# Patient Record
Sex: MALE | Race: WHITE | NOT HISPANIC OR LATINO | Employment: OTHER | URBAN - METROPOLITAN AREA
[De-identification: names, ages, dates, MRNs, and addresses within clinical notes are randomized per-mention and may not be internally consistent; named-entity substitution may affect disease eponyms.]

---

## 2022-12-01 ENCOUNTER — ANESTHESIA EVENT (OUTPATIENT)
Dept: PERIOP | Facility: HOSPITAL | Age: 82
End: 2022-12-01

## 2022-12-01 RX ORDER — FAMOTIDINE 10 MG/ML
20 INJECTION, SOLUTION INTRAVENOUS ONCE
Status: CANCELLED | OUTPATIENT
Start: 2022-12-01 | End: 2022-12-01

## 2022-12-02 ENCOUNTER — ANESTHESIA (OUTPATIENT)
Dept: PERIOP | Facility: HOSPITAL | Age: 82
End: 2022-12-02

## 2022-12-02 ENCOUNTER — HOSPITAL ENCOUNTER (OUTPATIENT)
Facility: HOSPITAL | Age: 82
Setting detail: HOSPITAL OUTPATIENT SURGERY
Discharge: HOME | End: 2022-12-02
Attending: UROLOGY | Admitting: UROLOGY

## 2022-12-02 VITALS
DIASTOLIC BLOOD PRESSURE: 67 MMHG | TEMPERATURE: 97.2 F | RESPIRATION RATE: 16 BRPM | HEART RATE: 55 BPM | BODY MASS INDEX: 34.69 KG/M2 | OXYGEN SATURATION: 93 % | WEIGHT: 221 LBS | HEIGHT: 67 IN | SYSTOLIC BLOOD PRESSURE: 121 MMHG

## 2022-12-02 DIAGNOSIS — N13.8 BPH WITH OBSTRUCTION/LOWER URINARY TRACT SYMPTOMS: Primary | ICD-10-CM

## 2022-12-02 DIAGNOSIS — N40.1 BPH WITH OBSTRUCTION/LOWER URINARY TRACT SYMPTOMS: Primary | ICD-10-CM

## 2022-12-02 LAB
ALBUMIN SERPL-MCNC: 3.8 G/DL (ref 3.5–5.2)
ALBUMIN/GLOB SERPL: 1 G/DL
ALP SERPL-CCNC: 56 U/L (ref 39–117)
ALT SERPL W P-5'-P-CCNC: 21 U/L (ref 1–41)
ANION GAP SERPL CALCULATED.3IONS-SCNC: 10 MMOL/L (ref 5–15)
AST SERPL-CCNC: 17 U/L (ref 1–40)
BILIRUB SERPL-MCNC: 0.4 MG/DL (ref 0–1.2)
BUN SERPL-MCNC: 24 MG/DL (ref 8–23)
BUN/CREAT SERPL: 29.6 (ref 7–25)
CALCIUM SPEC-SCNC: 9.1 MG/DL (ref 8.6–10.5)
CHLORIDE SERPL-SCNC: 109 MMOL/L (ref 98–107)
CO2 SERPL-SCNC: 21 MMOL/L (ref 22–29)
CREAT SERPL-MCNC: 0.81 MG/DL (ref 0.76–1.27)
DEPRECATED RDW RBC AUTO: 56.9 FL (ref 37–54)
EGFRCR SERPLBLD CKD-EPI 2021: 88 ML/MIN/1.73
ERYTHROCYTE [DISTWIDTH] IN BLOOD BY AUTOMATED COUNT: 19.7 % (ref 12.3–15.4)
GLOBULIN UR ELPH-MCNC: 3.8 GM/DL
GLUCOSE SERPL-MCNC: 102 MG/DL (ref 65–99)
HCT VFR BLD AUTO: 37.6 % (ref 37.5–51)
HGB BLD-MCNC: 11.5 G/DL (ref 13–17.7)
MCH RBC QN AUTO: 24.7 PG (ref 26.6–33)
MCHC RBC AUTO-ENTMCNC: 30.6 G/DL (ref 31.5–35.7)
MCV RBC AUTO: 80.9 FL (ref 79–97)
PLATELET # BLD AUTO: 246 10*3/MM3 (ref 140–450)
PMV BLD AUTO: 11.1 FL (ref 6–12)
POTASSIUM SERPL-SCNC: 4 MMOL/L (ref 3.5–5.2)
PROT SERPL-MCNC: 7.6 G/DL (ref 6–8.5)
QT INTERVAL: 420 MS
QTC INTERVAL: 456 MS
RBC # BLD AUTO: 4.65 10*6/MM3 (ref 4.14–5.8)
SODIUM SERPL-SCNC: 140 MMOL/L (ref 136–145)
WBC NRBC COR # BLD: 7.29 10*3/MM3 (ref 3.4–10.8)

## 2022-12-02 PROCEDURE — 85027 COMPLETE CBC AUTOMATED: CPT | Performed by: ANESTHESIOLOGY

## 2022-12-02 PROCEDURE — 25010000002 DEXAMETHASONE PER 1 MG: Performed by: NURSE ANESTHETIST, CERTIFIED REGISTERED

## 2022-12-02 PROCEDURE — 25010000002 FENTANYL CITRATE (PF) 50 MCG/ML SOLUTION

## 2022-12-02 PROCEDURE — 93010 ELECTROCARDIOGRAM REPORT: CPT | Performed by: INTERNAL MEDICINE

## 2022-12-02 PROCEDURE — 25010000002 FENTANYL CITRATE (PF) 100 MCG/2ML SOLUTION: Performed by: NURSE ANESTHETIST, CERTIFIED REGISTERED

## 2022-12-02 PROCEDURE — 80053 COMPREHEN METABOLIC PANEL: CPT | Performed by: ANESTHESIOLOGY

## 2022-12-02 PROCEDURE — 25010000002 ONDANSETRON PER 1 MG: Performed by: NURSE ANESTHETIST, CERTIFIED REGISTERED

## 2022-12-02 PROCEDURE — 25010000002 PROPOFOL 10 MG/ML EMULSION: Performed by: NURSE ANESTHETIST, CERTIFIED REGISTERED

## 2022-12-02 PROCEDURE — 93005 ELECTROCARDIOGRAM TRACING: CPT | Performed by: ANESTHESIOLOGY

## 2022-12-02 PROCEDURE — 25010000002 CEFOXITIN PER 1 G: Performed by: UROLOGY

## 2022-12-02 RX ORDER — DOCUSATE SODIUM 100 MG/1
100 CAPSULE, LIQUID FILLED ORAL 2 TIMES DAILY
COMMUNITY

## 2022-12-02 RX ORDER — HYDRALAZINE HYDROCHLORIDE 20 MG/ML
5 INJECTION INTRAMUSCULAR; INTRAVENOUS
Status: DISCONTINUED | OUTPATIENT
Start: 2022-12-02 | End: 2022-12-02 | Stop reason: HOSPADM

## 2022-12-02 RX ORDER — PROMETHAZINE HYDROCHLORIDE 25 MG/1
25 SUPPOSITORY RECTAL ONCE AS NEEDED
Status: DISCONTINUED | OUTPATIENT
Start: 2022-12-02 | End: 2022-12-02 | Stop reason: HOSPADM

## 2022-12-02 RX ORDER — HYDROCODONE BITARTRATE AND ACETAMINOPHEN 7.5; 325 MG/1; MG/1
1 TABLET ORAL EVERY 6 HOURS PRN
Qty: 15 TABLET | Refills: 0 | Status: SHIPPED | OUTPATIENT
Start: 2022-12-02

## 2022-12-02 RX ORDER — HYDROMORPHONE HYDROCHLORIDE 1 MG/ML
0.5 INJECTION, SOLUTION INTRAMUSCULAR; INTRAVENOUS; SUBCUTANEOUS
Status: DISCONTINUED | OUTPATIENT
Start: 2022-12-02 | End: 2022-12-02 | Stop reason: HOSPADM

## 2022-12-02 RX ORDER — EPHEDRINE SULFATE 50 MG/ML
INJECTION INTRAVENOUS AS NEEDED
Status: DISCONTINUED | OUTPATIENT
Start: 2022-12-02 | End: 2022-12-02 | Stop reason: SURG

## 2022-12-02 RX ORDER — PHENAZOPYRIDINE HYDROCHLORIDE 200 MG/1
200 TABLET, FILM COATED ORAL 3 TIMES DAILY PRN
Qty: 30 TABLET | Refills: 0 | Status: SHIPPED | OUTPATIENT
Start: 2022-12-02

## 2022-12-02 RX ORDER — LIDOCAINE HYDROCHLORIDE 10 MG/ML
0.5 INJECTION, SOLUTION EPIDURAL; INFILTRATION; INTRACAUDAL; PERINEURAL ONCE AS NEEDED
Status: COMPLETED | OUTPATIENT
Start: 2022-12-02 | End: 2022-12-02

## 2022-12-02 RX ORDER — DROPERIDOL 2.5 MG/ML
0.62 INJECTION, SOLUTION INTRAMUSCULAR; INTRAVENOUS
Status: DISCONTINUED | OUTPATIENT
Start: 2022-12-02 | End: 2022-12-02 | Stop reason: HOSPADM

## 2022-12-02 RX ORDER — NITROFURANTOIN 25; 75 MG/1; MG/1
100 CAPSULE ORAL 2 TIMES DAILY
Qty: 14 CAPSULE | Refills: 0 | Status: SHIPPED | OUTPATIENT
Start: 2022-12-02

## 2022-12-02 RX ORDER — DROPERIDOL 2.5 MG/ML
0.62 INJECTION, SOLUTION INTRAMUSCULAR; INTRAVENOUS ONCE AS NEEDED
Status: DISCONTINUED | OUTPATIENT
Start: 2022-12-02 | End: 2022-12-02 | Stop reason: HOSPADM

## 2022-12-02 RX ORDER — PROMETHAZINE HYDROCHLORIDE 25 MG/1
25 TABLET ORAL ONCE AS NEEDED
Status: DISCONTINUED | OUTPATIENT
Start: 2022-12-02 | End: 2022-12-02 | Stop reason: HOSPADM

## 2022-12-02 RX ORDER — LABETALOL HYDROCHLORIDE 5 MG/ML
5 INJECTION, SOLUTION INTRAVENOUS
Status: DISCONTINUED | OUTPATIENT
Start: 2022-12-02 | End: 2022-12-02 | Stop reason: HOSPADM

## 2022-12-02 RX ORDER — SODIUM CHLORIDE 0.9 % (FLUSH) 0.9 %
3 SYRINGE (ML) INJECTION EVERY 12 HOURS SCHEDULED
Status: DISCONTINUED | OUTPATIENT
Start: 2022-12-02 | End: 2022-12-02 | Stop reason: HOSPADM

## 2022-12-02 RX ORDER — SODIUM CHLORIDE 0.9 % (FLUSH) 0.9 %
10 SYRINGE (ML) INJECTION AS NEEDED
Status: DISCONTINUED | OUTPATIENT
Start: 2022-12-02 | End: 2022-12-02 | Stop reason: HOSPADM

## 2022-12-02 RX ORDER — FENTANYL CITRATE 50 UG/ML
INJECTION, SOLUTION INTRAMUSCULAR; INTRAVENOUS
Status: COMPLETED
Start: 2022-12-02 | End: 2022-12-02

## 2022-12-02 RX ORDER — TROSPIUM CHLORIDE 20 MG/1
20 TABLET, FILM COATED ORAL 2 TIMES DAILY
COMMUNITY

## 2022-12-02 RX ORDER — LOSARTAN POTASSIUM 50 MG/1
50 TABLET ORAL DAILY
COMMUNITY

## 2022-12-02 RX ORDER — FUROSEMIDE 20 MG/1
20 TABLET ORAL DAILY
COMMUNITY

## 2022-12-02 RX ORDER — MAGNESIUM HYDROXIDE 1200 MG/15ML
LIQUID ORAL AS NEEDED
Status: DISCONTINUED | OUTPATIENT
Start: 2022-12-02 | End: 2022-12-02 | Stop reason: HOSPADM

## 2022-12-02 RX ORDER — SODIUM CHLORIDE 0.9 % (FLUSH) 0.9 %
3-10 SYRINGE (ML) INJECTION AS NEEDED
Status: DISCONTINUED | OUTPATIENT
Start: 2022-12-02 | End: 2022-12-02 | Stop reason: HOSPADM

## 2022-12-02 RX ORDER — ONDANSETRON 2 MG/ML
INJECTION INTRAMUSCULAR; INTRAVENOUS AS NEEDED
Status: DISCONTINUED | OUTPATIENT
Start: 2022-12-02 | End: 2022-12-02 | Stop reason: SURG

## 2022-12-02 RX ORDER — SODIUM CHLORIDE 9 MG/ML
40 INJECTION, SOLUTION INTRAVENOUS AS NEEDED
Status: DISCONTINUED | OUTPATIENT
Start: 2022-12-02 | End: 2022-12-02 | Stop reason: HOSPADM

## 2022-12-02 RX ORDER — FENTANYL CITRATE 50 UG/ML
50 INJECTION, SOLUTION INTRAMUSCULAR; INTRAVENOUS
Status: DISCONTINUED | OUTPATIENT
Start: 2022-12-02 | End: 2022-12-02 | Stop reason: HOSPADM

## 2022-12-02 RX ORDER — FAMOTIDINE 20 MG/1
20 TABLET, FILM COATED ORAL ONCE
Status: COMPLETED | OUTPATIENT
Start: 2022-12-02 | End: 2022-12-02

## 2022-12-02 RX ORDER — FENTANYL CITRATE 50 UG/ML
INJECTION, SOLUTION INTRAMUSCULAR; INTRAVENOUS AS NEEDED
Status: DISCONTINUED | OUTPATIENT
Start: 2022-12-02 | End: 2022-12-02 | Stop reason: SURG

## 2022-12-02 RX ORDER — NALOXONE HCL 0.4 MG/ML
0.4 VIAL (ML) INJECTION AS NEEDED
Status: DISCONTINUED | OUTPATIENT
Start: 2022-12-02 | End: 2022-12-02 | Stop reason: HOSPADM

## 2022-12-02 RX ORDER — SODIUM CHLORIDE, SODIUM LACTATE, POTASSIUM CHLORIDE, CALCIUM CHLORIDE 600; 310; 30; 20 MG/100ML; MG/100ML; MG/100ML; MG/100ML
9 INJECTION, SOLUTION INTRAVENOUS CONTINUOUS
Status: DISCONTINUED | OUTPATIENT
Start: 2022-12-02 | End: 2022-12-02 | Stop reason: HOSPADM

## 2022-12-02 RX ORDER — IPRATROPIUM BROMIDE AND ALBUTEROL SULFATE 2.5; .5 MG/3ML; MG/3ML
3 SOLUTION RESPIRATORY (INHALATION) ONCE AS NEEDED
Status: DISCONTINUED | OUTPATIENT
Start: 2022-12-02 | End: 2022-12-02 | Stop reason: HOSPADM

## 2022-12-02 RX ORDER — LACOSAMIDE 50 MG/1
50 TABLET ORAL DAILY
COMMUNITY

## 2022-12-02 RX ORDER — TOPIRAMATE 100 MG/1
100 TABLET, FILM COATED ORAL NIGHTLY
COMMUNITY

## 2022-12-02 RX ORDER — FINASTERIDE 5 MG/1
5 TABLET, FILM COATED ORAL DAILY
COMMUNITY

## 2022-12-02 RX ORDER — PHENOBARBITAL 32.4 MG/1
32.4 TABLET ORAL NIGHTLY
COMMUNITY

## 2022-12-02 RX ORDER — LIDOCAINE HYDROCHLORIDE 10 MG/ML
INJECTION, SOLUTION EPIDURAL; INFILTRATION; INTRACAUDAL; PERINEURAL AS NEEDED
Status: DISCONTINUED | OUTPATIENT
Start: 2022-12-02 | End: 2022-12-02 | Stop reason: SURG

## 2022-12-02 RX ORDER — ONDANSETRON 2 MG/ML
4 INJECTION INTRAMUSCULAR; INTRAVENOUS ONCE AS NEEDED
Status: DISCONTINUED | OUTPATIENT
Start: 2022-12-02 | End: 2022-12-02 | Stop reason: HOSPADM

## 2022-12-02 RX ORDER — PROPOFOL 10 MG/ML
VIAL (ML) INTRAVENOUS AS NEEDED
Status: DISCONTINUED | OUTPATIENT
Start: 2022-12-02 | End: 2022-12-02 | Stop reason: SURG

## 2022-12-02 RX ORDER — OMEPRAZOLE 40 MG/1
40 CAPSULE, DELAYED RELEASE ORAL DAILY
COMMUNITY

## 2022-12-02 RX ORDER — HYDROCODONE BITARTRATE AND ACETAMINOPHEN 5; 325 MG/1; MG/1
1 TABLET ORAL ONCE AS NEEDED
Status: DISCONTINUED | OUTPATIENT
Start: 2022-12-02 | End: 2022-12-02 | Stop reason: HOSPADM

## 2022-12-02 RX ORDER — MEPERIDINE HYDROCHLORIDE 25 MG/ML
12.5 INJECTION INTRAMUSCULAR; INTRAVENOUS; SUBCUTANEOUS
Status: DISCONTINUED | OUTPATIENT
Start: 2022-12-02 | End: 2022-12-02 | Stop reason: HOSPADM

## 2022-12-02 RX ORDER — DOCUSATE SODIUM 250 MG
250 CAPSULE ORAL DAILY
Qty: 30 CAPSULE | Refills: 0 | Status: SHIPPED | OUTPATIENT
Start: 2022-12-02

## 2022-12-02 RX ORDER — DEXAMETHASONE SODIUM PHOSPHATE 4 MG/ML
INJECTION, SOLUTION INTRA-ARTICULAR; INTRALESIONAL; INTRAMUSCULAR; INTRAVENOUS; SOFT TISSUE AS NEEDED
Status: DISCONTINUED | OUTPATIENT
Start: 2022-12-02 | End: 2022-12-02 | Stop reason: SURG

## 2022-12-02 RX ORDER — EZETIMIBE 10 MG/1
10 TABLET ORAL DAILY
COMMUNITY

## 2022-12-02 RX ORDER — SODIUM CHLORIDE 0.9 % (FLUSH) 0.9 %
10 SYRINGE (ML) INJECTION EVERY 12 HOURS SCHEDULED
Status: DISCONTINUED | OUTPATIENT
Start: 2022-12-02 | End: 2022-12-02 | Stop reason: HOSPADM

## 2022-12-02 RX ORDER — PRAMIPEXOLE DIHYDROCHLORIDE 1.5 MG/1
1.5 TABLET ORAL 2 TIMES DAILY
COMMUNITY

## 2022-12-02 RX ORDER — SODIUM CHLORIDE 9 MG/ML
INJECTION, SOLUTION INTRAVENOUS AS NEEDED
Status: DISCONTINUED | OUTPATIENT
Start: 2022-12-02 | End: 2022-12-02 | Stop reason: HOSPADM

## 2022-12-02 RX ADMIN — PROPOFOL 100 MG: 10 INJECTION, EMULSION INTRAVENOUS at 08:48

## 2022-12-02 RX ADMIN — FENTANYL CITRATE 50 MCG: 50 INJECTION, SOLUTION INTRAMUSCULAR; INTRAVENOUS at 10:28

## 2022-12-02 RX ADMIN — LIDOCAINE HYDROCHLORIDE 40 MG: 10 INJECTION, SOLUTION EPIDURAL; INFILTRATION; INTRACAUDAL; PERINEURAL at 08:48

## 2022-12-02 RX ADMIN — ONDANSETRON 4 MG: 2 INJECTION INTRAMUSCULAR; INTRAVENOUS at 08:56

## 2022-12-02 RX ADMIN — Medication 2 G: at 08:38

## 2022-12-02 RX ADMIN — SODIUM CHLORIDE, POTASSIUM CHLORIDE, SODIUM LACTATE AND CALCIUM CHLORIDE 9 ML/HR: 600; 310; 30; 20 INJECTION, SOLUTION INTRAVENOUS at 07:00

## 2022-12-02 RX ADMIN — PROPOFOL 50 MG: 10 INJECTION, EMULSION INTRAVENOUS at 08:50

## 2022-12-02 RX ADMIN — FENTANYL CITRATE 100 MCG: 50 INJECTION, SOLUTION INTRAMUSCULAR; INTRAVENOUS at 08:48

## 2022-12-02 RX ADMIN — PROPOFOL 100 MG: 10 INJECTION, EMULSION INTRAVENOUS at 09:10

## 2022-12-02 RX ADMIN — EPHEDRINE SULFATE 10 MG: 50 INJECTION INTRAVENOUS at 08:54

## 2022-12-02 RX ADMIN — PROPOFOL 50 MG: 10 INJECTION, EMULSION INTRAVENOUS at 08:52

## 2022-12-02 RX ADMIN — PROPOFOL 50 MG: 10 INJECTION, EMULSION INTRAVENOUS at 08:56

## 2022-12-02 RX ADMIN — DEXAMETHASONE SODIUM PHOSPHATE 8 MG: 4 INJECTION, SOLUTION INTRA-ARTICULAR; INTRALESIONAL; INTRAMUSCULAR; INTRAVENOUS; SOFT TISSUE at 08:56

## 2022-12-02 RX ADMIN — SODIUM CHLORIDE, POTASSIUM CHLORIDE, SODIUM LACTATE AND CALCIUM CHLORIDE: 600; 310; 30; 20 INJECTION, SOLUTION INTRAVENOUS at 09:20

## 2022-12-02 RX ADMIN — LIDOCAINE HYDROCHLORIDE 0.2 ML: 10 INJECTION, SOLUTION EPIDURAL; INFILTRATION; INTRACAUDAL; PERINEURAL at 07:00

## 2022-12-02 RX ADMIN — FAMOTIDINE 20 MG: 20 TABLET ORAL at 07:41

## 2022-12-02 NOTE — ANESTHESIA PREPROCEDURE EVALUATION
Anesthesia Evaluation     Patient summary reviewed and Nursing notes reviewed   NPO Solid Status: > 8 hours  NPO Liquid Status: > 8 hours           Airway   Mallampati: I  TM distance: >3 FB  Neck ROM: full  No difficulty expected  Dental    (+) upper dentures, edentulous and lower dentures    Pulmonary    (+) pulmonary embolism, COPD moderate, sleep apnea on CPAP,   (-) shortness of breath  Cardiovascular     ECG reviewed    (+) hypertension, dysrhythmias Atrial Fib, PVD (AAA), hyperlipidemia,   (-) past MI, CAD, angina, cardiac stents    ROS comment: ECG A fib    Neuro/Psych  (+) seizures (took pm seizure meds),    (-) CVA  GI/Hepatic/Renal/Endo    (+)  GERD,  renal disease (?), diabetes mellitus type 2,   (-) no thyroid disorder    Musculoskeletal     Abdominal    Substance History      OB/GYN          Other   arthritis,      ROS/Med Hx Other: Eliquis                 Anesthesia Plan    ASA 3     general     ( LAB s pending)  intravenous induction     Anesthetic plan, risks, benefits, and alternatives have been provided, discussed and informed consent has been obtained with: patient.    Plan discussed with CRNA.        CODE STATUS:

## 2022-12-02 NOTE — H&P
Pre-Op H&P  Caesar Kim  [id number]  1940    Chief complaint: urge incontinence     HPI:    Patient is a 82 y.o.male who presents with a history of urinary urge incontinence in the presence of benign prostatic hypertrophy. He presents to the operating room today for surgical management with a cystoscopy and transurethral resection of prostate with greenlight laser.     Review of Systems:  General ROS: negative for chills, fever or skin lesions;  No changes since last office visit.  Neg for recent sick exposure  Cardiovascular ROS: no chest pain or dyspnea on exertion  Respiratory ROS: no cough, shortness of breath, or wheezing    Allergies: Not on File    Home Meds:    No current facility-administered medications on file prior to encounter.     Current Outpatient Medications on File Prior to Encounter   Medication Sig Dispense Refill   • apixaban (ELIQUIS) 5 MG tablet tablet Take 5 mg by mouth 2 (Two) Times a Day.     • docusate sodium (COLACE) 100 MG capsule Take 100 mg by mouth 2 (Two) Times a Day.     • ezetimibe (ZETIA) 10 MG tablet Take 10 mg by mouth Daily.     • finasteride (PROSCAR) 5 MG tablet Take 5 mg by mouth Daily.     • furosemide (LASIX) 20 MG tablet Take 20 mg by mouth Daily.     • lacosamide (VIMPAT) 50 MG tablet tablet Take 50 mg by mouth Daily.     • losartan (COZAAR) 50 MG tablet Take 50 mg by mouth Daily.     • omeprazole (priLOSEC) 40 MG capsule Take 40 mg by mouth Daily.     • PHENobarbital (LUMINAL) 32.4 MG tablet Take 32.4 mg by mouth Every Night. Takes 2 pille at bedtime     • Potassium Chloride Martha ER (KLOR-CON M20 PO) Take 1 tablet by mouth Daily.     • pramipexole (MIRAPEX) 1.5 MG tablet Take 1.5 mg by mouth 2 (Two) Times a Day.     • topiramate (TOPAMAX) 100 MG tablet Take 100 mg by mouth Every Night. Patient takes 2 pills QHS     • trospium (SANCTURA) 20 MG tablet Take 20 mg by mouth 2 (Two) Times a Day.       Immunization History:  Influenza: 2022  Pneumococcal: patient unsure  of date   Tetanus: <10 years     Social History:   Tobacco:   Social History     Tobacco Use   Smoking Status Not on file   Smokeless Tobacco Not on file      Alcohol:     Social History     Substance and Sexual Activity   Alcohol Use Not on file       Physical Exam:  General Appearance:    Alert, cooperative, no distress, appears stated age   Head:    Normocephalic, without obvious abnormality, atraumatic   Lungs:     Clear to auscultation bilaterally, respirations unlabored    Heart:   Regular rate and rhythm, S1 and S2 normal, no murmur, rub    or gallop    Abdomen:    Soft, nontender.  +bowel sounds   Breast Exam:    deferred   Genitalia:    deferred   Extremities:   Extremities normal, atraumatic, no cyanosis or edema   Skin:   Skin color, texture, turgor normal, no rashes or lesions   Neurologic:   Grossly intact   Results Review  LABS:  No results found for: WBC, HGB, HCT, MCV, PLT, NEUTROABS, GLUCOSE, BUN, CREATININE, EGFRIFNONA, EGFRIFAFRI, NA, K, CL, CO2, MG, PHOS, CALCIUM, ALBUMIN, AST, ALT, BILITOT, PTT, INR    RADIOLOGY:  No radiology results for the last 3 days     Cancer Staging (if applicable)  Cancer Patient: __ yes _x_no __unknown; If yes, clinical stage T:__ N:__M:__, stage group or __N/A    Impression: benign prostatic hypertrophy with urge incontinence     Plan: cystoscopy, transurethral resection of prostate with greenlight       Kostas Reyna PA-C   12/02/22   7:03 AM EST

## 2022-12-02 NOTE — ANESTHESIA PROCEDURE NOTES
Airway  Urgency: elective    Date/Time: 12/2/2022 8:51 AM  Airway not difficult    General Information and Staff    Patient location during procedure: OR  CRNA/CAA: Jihan Peter CRNA    Indications and Patient Condition  Indications for airway management: airway protection    Preoxygenated: yes  Mask difficulty assessment: 1 - vent by mask    Final Airway Details  Final airway type: supraglottic airway      Successful airway: I-gel  Size 5     Number of attempts at approach: 1  Assessment: lips, teeth, and gum same as pre-op    Additional Comments  LMA placed without difficulty, ventilation with assist, equal breath sounds and symmetric chest rise and fall

## 2022-12-02 NOTE — OP NOTE
CYSTOSCOPY TRANSURETHRAL RESECTION OF PROSTATE GREENLIGHT  Procedure Report    Patient Name:  Caesar Kim  YOB: 1940    Date of Surgery:  12/2/2022     Indications: Prostate enlargement with obstruction    Pre-op Diagnosis:   Enlarged prostate with obstructive symptoms       Post-Op Diagnosis Codes:  Same    Procedure/CPT® Codes:      Procedure(s):  CYSTOSCOPY TRANSURETHRAL RESECTION OF PROSTATE GREENLIGHT        Staff:  Surgeon(s):  Darius Costa MD         Anesthesia: General    Estimated Blood Loss: None    Implants:    Nothing was implanted during the procedure    Specimen:          None        Findings: Obstructing bilateral prostate tissue    Complications: None    Description of Procedure: After satisfactory general anesthesia he was placed in thigh position.  Sequential compression garments in place and function time induction.  Timeout was called.  The 24 [place] continuous-flow laser cystoscopy sheath was introduced with the obturator.  Ureteral orifice ease were visualized well away from the area of resection.  He had obstructing tissue bilaterally.  Laser was used initially at 80 W and then increase to 125 to completely clear his prostatic urethra down to the level of the Veru.  All vaporization was proximal to this level.  At completion hemostasis was achieved.  He had complete wide open prostatic urethra.  Ureter orifice ease were inspected and were spared.  A 20 [place] 30 cc balloon was easily placed in his E flux was clear.  He was awakened extubated and transferred postop recovery in stable condition.  He will follow-up with me in 72 hours for catheter removal.                      Darius Costa MD     Date: 12/2/2022  Time: 09:38 EST

## 2022-12-02 NOTE — ANESTHESIA POSTPROCEDURE EVALUATION
Patient: Caesar Kim    Procedure Summary     Date: 12/02/22 Room / [facility]:  [facility]    Anesthesia Start: 0829 Anesthesia Stop: 0951    Procedure: CYSTOSCOPY TRANSURETHRAL RESECTION OF PROSTATE GREENLIGHT Diagnosis:     Surgeons: Darius Costa MD Provider: Tex Perry MD    Anesthesia Type: general ASA Status: 3          Anesthesia Type: general    Vitals  Vitals Value Taken Time   /75 12/02/22 0948   Temp     Pulse 73 12/02/22 0951   Resp     SpO2 89 % 12/02/22 0951   Vitals shown include unvalidated device data.        Post Anesthesia Care and Evaluation    Patient location during evaluation: PACU  Patient participation: complete - patient participated  Level of consciousness: awake and alert  Pain score: 0  Pain management: adequate    Airway patency: patent  Anesthetic complications: No anesthetic complications  PONV Status: none  Cardiovascular status: hemodynamically stable and acceptable  Respiratory status: nonlabored ventilation, acceptable, nasal cannula and spontaneous ventilation  Hydration status: acceptable

## 2022-12-22 ENCOUNTER — TRANSCRIBE ORDERS (OUTPATIENT)
Dept: ADMINISTRATIVE | Facility: HOSPITAL | Age: 82
End: 2022-12-22

## 2022-12-22 DIAGNOSIS — I71.40 ABDOMINAL AORTIC ANEURYSM (AAA) WITHOUT RUPTURE, UNSPECIFIED PART: Primary | ICD-10-CM

## 2023-01-10 ENCOUNTER — HOSPITAL ENCOUNTER (OUTPATIENT)
Dept: CT IMAGING | Facility: HOSPITAL | Age: 83
Discharge: HOME | End: 2023-01-10
Admitting: SURGERY
Payer: MEDICARE

## 2023-01-10 DIAGNOSIS — I71.40 ABDOMINAL AORTIC ANEURYSM (AAA) WITHOUT RUPTURE, UNSPECIFIED PART: ICD-10-CM

## 2023-01-10 LAB — CREAT BLDA-MCNC: 1.1 MG/DL (ref 0.6–1.3)

## 2023-01-10 PROCEDURE — 0 IOPAMIDOL PER 1 ML: Performed by: SURGERY

## 2023-01-10 PROCEDURE — 74174 CTA ABD&PLVS W/CONTRAST: CPT

## 2023-01-10 PROCEDURE — 82565 ASSAY OF CREATININE: CPT

## 2023-01-10 RX ADMIN — IOPAMIDOL 90 ML: 755 INJECTION, SOLUTION INTRAVENOUS at 13:27

## 2023-05-02 ENCOUNTER — APPOINTMENT (OUTPATIENT)
Dept: CT IMAGING | Facility: HOSPITAL | Age: 83
End: 2023-05-02
Payer: MEDICARE

## 2023-05-02 ENCOUNTER — HOSPITAL ENCOUNTER (EMERGENCY)
Facility: HOSPITAL | Age: 83
Discharge: HOME | End: 2023-05-02
Attending: EMERGENCY MEDICINE | Admitting: EMERGENCY MEDICINE
Payer: MEDICARE

## 2023-05-02 VITALS
RESPIRATION RATE: 18 BRPM | OXYGEN SATURATION: 92 % | WEIGHT: 220 LBS | HEART RATE: 60 BPM | DIASTOLIC BLOOD PRESSURE: 86 MMHG | TEMPERATURE: 98.9 F | HEIGHT: 67 IN | BODY MASS INDEX: 34.53 KG/M2 | SYSTOLIC BLOOD PRESSURE: 146 MMHG

## 2023-05-02 DIAGNOSIS — S09.90XA INJURY OF HEAD, INITIAL ENCOUNTER: ICD-10-CM

## 2023-05-02 DIAGNOSIS — S22.31XA CLOSED FRACTURE OF ONE RIB OF RIGHT SIDE, INITIAL ENCOUNTER: Primary | ICD-10-CM

## 2023-05-02 DIAGNOSIS — T14.8XXA SKIN AVULSION: ICD-10-CM

## 2023-05-02 LAB
ALBUMIN SERPL-MCNC: 4.1 G/DL (ref 3.5–5.2)
ALBUMIN/GLOB SERPL: 1 G/DL
ALP SERPL-CCNC: 71 U/L (ref 39–117)
ALT SERPL W P-5'-P-CCNC: 28 U/L (ref 1–41)
ANION GAP SERPL CALCULATED.3IONS-SCNC: 10 MMOL/L (ref 5–15)
AST SERPL-CCNC: 22 U/L (ref 1–40)
BASOPHILS # BLD AUTO: 0.06 10*3/MM3 (ref 0–0.2)
BASOPHILS NFR BLD AUTO: 0.8 % (ref 0–1.5)
BILIRUB SERPL-MCNC: 0.3 MG/DL (ref 0–1.2)
BUN SERPL-MCNC: 22 MG/DL (ref 8–23)
BUN/CREAT SERPL: 22.2 (ref 7–25)
CALCIUM SPEC-SCNC: 9.4 MG/DL (ref 8.6–10.5)
CHLORIDE SERPL-SCNC: 110 MMOL/L (ref 98–107)
CK SERPL-CCNC: 144 U/L (ref 20–200)
CO2 SERPL-SCNC: 22 MMOL/L (ref 22–29)
CREAT SERPL-MCNC: 0.99 MG/DL (ref 0.76–1.27)
DEPRECATED RDW RBC AUTO: 59.3 FL (ref 37–54)
EGFRCR SERPLBLD CKD-EPI 2021: 75.6 ML/MIN/1.73
EOSINOPHIL # BLD AUTO: 0.2 10*3/MM3 (ref 0–0.4)
EOSINOPHIL NFR BLD AUTO: 2.8 % (ref 0.3–6.2)
ERYTHROCYTE [DISTWIDTH] IN BLOOD BY AUTOMATED COUNT: 19.1 % (ref 12.3–15.4)
GLOBULIN UR ELPH-MCNC: 4.2 GM/DL
GLUCOSE SERPL-MCNC: 99 MG/DL (ref 65–99)
HCT VFR BLD AUTO: 41.1 % (ref 37.5–51)
HGB BLD-MCNC: 11.6 G/DL (ref 13–17.7)
IMM GRANULOCYTES # BLD AUTO: 0.03 10*3/MM3 (ref 0–0.05)
IMM GRANULOCYTES NFR BLD AUTO: 0.4 % (ref 0–0.5)
LYMPHOCYTES # BLD AUTO: 1.89 10*3/MM3 (ref 0.7–3.1)
LYMPHOCYTES NFR BLD AUTO: 26.6 % (ref 19.6–45.3)
MCH RBC QN AUTO: 23.7 PG (ref 26.6–33)
MCHC RBC AUTO-ENTMCNC: 28.2 G/DL (ref 31.5–35.7)
MCV RBC AUTO: 83.9 FL (ref 79–97)
MONOCYTES # BLD AUTO: 1.1 10*3/MM3 (ref 0.1–0.9)
MONOCYTES NFR BLD AUTO: 15.5 % (ref 5–12)
MYOGLOBIN SERPL-MCNC: 108 NG/ML (ref 28–72)
NEUTROPHILS NFR BLD AUTO: 3.82 10*3/MM3 (ref 1.7–7)
NEUTROPHILS NFR BLD AUTO: 53.9 % (ref 42.7–76)
NRBC BLD AUTO-RTO: 0 /100 WBC (ref 0–0.2)
PHENOBARB SERPL-MCNC: 9.6 MCG/ML (ref 10–30)
PLATELET # BLD AUTO: 242 10*3/MM3 (ref 140–450)
PMV BLD AUTO: 10 FL (ref 6–12)
POTASSIUM SERPL-SCNC: 5 MMOL/L (ref 3.5–5.2)
PROT SERPL-MCNC: 8.3 G/DL (ref 6–8.5)
RBC # BLD AUTO: 4.9 10*6/MM3 (ref 4.14–5.8)
SODIUM SERPL-SCNC: 142 MMOL/L (ref 136–145)
WBC NRBC COR # BLD: 7.1 10*3/MM3 (ref 3.4–10.8)

## 2023-05-02 PROCEDURE — 71250 CT THORAX DX C-: CPT

## 2023-05-02 PROCEDURE — 83874 ASSAY OF MYOGLOBIN: CPT | Performed by: EMERGENCY MEDICINE

## 2023-05-02 PROCEDURE — 82550 ASSAY OF CK (CPK): CPT | Performed by: EMERGENCY MEDICINE

## 2023-05-02 PROCEDURE — 80184 ASSAY OF PHENOBARBITAL: CPT | Performed by: EMERGENCY MEDICINE

## 2023-05-02 PROCEDURE — 99283 EMERGENCY DEPT VISIT LOW MDM: CPT

## 2023-05-02 PROCEDURE — 70450 CT HEAD/BRAIN W/O DYE: CPT

## 2023-05-02 PROCEDURE — 80053 COMPREHEN METABOLIC PANEL: CPT | Performed by: EMERGENCY MEDICINE

## 2023-05-02 PROCEDURE — 36415 COLL VENOUS BLD VENIPUNCTURE: CPT

## 2023-05-02 PROCEDURE — 85025 COMPLETE CBC W/AUTO DIFF WBC: CPT | Performed by: EMERGENCY MEDICINE

## 2023-05-02 RX ORDER — HYDROCODONE BITARTRATE AND ACETAMINOPHEN 7.5; 325 MG/1; MG/1
1 TABLET ORAL EVERY 6 HOURS PRN
Qty: 12 TABLET | Refills: 0 | Status: SHIPPED | OUTPATIENT
Start: 2023-05-02

## 2023-05-02 RX ORDER — HYDROCODONE BITARTRATE AND ACETAMINOPHEN 7.5; 325 MG/1; MG/1
1 TABLET ORAL ONCE
Status: COMPLETED | OUTPATIENT
Start: 2023-05-02 | End: 2023-05-02

## 2023-05-02 RX ADMIN — HYDROCODONE BITARTRATE AND ACETAMINOPHEN 1 TABLET: 7.5; 325 TABLET ORAL at 17:00

## 2023-05-02 NOTE — ED PROVIDER NOTES
Subjective   History of Present Illness    Pt presents after a fall. Says he was sitting in his chair and just fell out of it.  Struck head and chest on the floor.  Abrasion to right arm.  He denies pain to the head, neck, back, arms or legs.  Only hurts in right chest area.  He is anticoagulated.  Says it took about an hour to get to the phone.    PMH HTN, AF, apparently a seizure disorder.    History provided by:  Patient      Review of Systems   Constitutional: Negative for fever.   Gastrointestinal: Negative for vomiting.   Musculoskeletal: Negative for back pain and neck pain.   Skin: Positive for wound.   Neurological: Positive for headaches.   All other systems reviewed and are negative.      Past Medical History:   Diagnosis Date   • AAA (abdominal aortic aneurysm)    • Arthritis    • Atrial fibrillation    • BPH (benign prostatic hyperplasia)    • Elevated cholesterol    • GERD (gastroesophageal reflux disease)    • Hypertension    • Pulmonary embolism    • Sleep apnea     Uses CPAP       Allergies   Allergen Reactions   • Acyclovir Other (See Comments) and GI Intolerance     Other reaction(s): Other, Other: See Comments     • Atorvastatin Myalgia     Other reaction(s): Myalgia, Myalgias (Muscle Pain)     • Fenofibrate Myalgia     Other reaction(s): Myalgias (Muscle Pain)  Other reaction(s): Myalgia     • Metformin Unknown - High Severity     Other reaction(s): Unknown     • Mirabegron Unknown - High Severity     Other reaction(s): Unknown         Past Surgical History:   Procedure Laterality Date   • CHOLECYSTECTOMY     • COLONOSCOPY     • CYSTOSCOPY TRANSURETHRAL RESECTION OF PROSTATE N/A 12/2/2022    Procedure: CYSTOSCOPY TRANSURETHRAL RESECTION OF PROSTATE GREENLIGHT;  Surgeon: Darius Costa MD;  [facility];  Service: Urology;  Laterality: N/A;   • ENDOSCOPY     • EYE SURGERY Bilateral     CATARACTS   • SHOULDER SURGERY Left    • SKIN BIOPSY      NECK   • TONSILLECTOMY      AND ADENOIDS        No family history on file.    Social History     Socioeconomic History   • Marital status:    Tobacco Use   • Smoking status: Former     Types: Cigarettes     Quit date:      Years since quittin.3   • Smokeless tobacco: Former     Quit date:    Substance and Sexual Activity   • Drug use: Not Currently   • Sexual activity: Defer           Objective   Physical Exam  Vitals and nursing note reviewed.   Constitutional:       General: He is not in acute distress.     Appearance: Normal appearance. He is not ill-appearing.   HENT:      Head: Normocephalic and atraumatic.      Comments: No wounds or tenderness  Eyes:      General: No scleral icterus.        Right eye: No discharge.         Left eye: No discharge.      Conjunctiva/sclera: Conjunctivae normal.   Neck:      Comments: Nontender  Cardiovascular:      Rate and Rhythm: Normal rate and regular rhythm.   Pulmonary:      Effort: Pulmonary effort is normal. No respiratory distress.      Breath sounds: Normal breath sounds.   Musculoskeletal:         General: No swelling, tenderness or deformity.      Cervical back: Normal range of motion and neck supple.      Comments: Skin avulsion to the right upper arm laterally.    Except as documented there is no tenderness to gross palpation of the arms or legs, and intact painless ROM to the shoulders, elbows, wrists, hips, knees and ankles.   Skin:     General: Skin is dry.      Findings: No rash.   Neurological:      General: No focal deficit present.      Mental Status: He is alert and oriented to person, place, and time. Mental status is at baseline.   Psychiatric:         Mood and Affect: Mood normal.         Behavior: Behavior normal.         Thought Content: Thought content normal.         Procedures           ED Course         CT head negative. Labs benign.  CT chest shows a single rib fracture.    Family arrived, says he often falls asleep in his chair and that is how he falls.    Patient  stable on serial rechecks.  Discussed findings, concerns, plan of care, expected course, reasons to return and followup.  Provided the opportunity to ask questions.                            WANG reviewed by Jack Garcia MD       Medical Decision Making  Closed fracture of one rib of right side, initial encounter: acute illness or injury  Injury of head, initial encounter: acute illness or injury  Skin avulsion: acute illness or injury  Amount and/or Complexity of Data Reviewed  Labs: ordered. Decision-making details documented in ED Course.  Radiology: ordered. Decision-making details documented in ED Course.      Risk  Prescription drug management.          Final diagnoses:   Closed fracture of one rib of right side, initial encounter   Skin avulsion   Injury of head, initial encounter       ED Disposition  ED Disposition     ED Disposition   Discharge    Condition   Stable    Comment   --             Dixon Gilman MD  [address] [place] [address] [phone]    In 3 days           Medication List      Changed    * HYDROcodone-acetaminophen 7.5-325 MG per tablet  Commonly known as: NORCO  Take 1 tablet by mouth Every 6 (Six) Hours As Needed for Moderate Pain.  What changed: Another medication with the same name was added. Make sure you understand how and when to take each.     * HYDROcodone-acetaminophen 7.5-325 MG per tablet  Commonly known as: NORCO  Take 1 tablet by mouth Every 6 (Six) Hours As Needed for Moderate Pain.  What changed: You were already taking a medication with the same name, and this prescription was added. Make sure you understand how and when to take each.         * This list has 2 medication(s) that are the same as other medications prescribed for you. Read the directions carefully, and ask your doctor or other care provider to review them with you.               Where to Get Your Medications      These medications were sent to [id number] -  [place], [place] - [address] - [phone]  - [phone]  [address], [facility] [address]    Phone: [phone]   · HYDROcodone-acetaminophen 7.5-325 MG per tablet          Jack Garcia MD  05/02/23 0953

## 2023-10-18 ENCOUNTER — DOCUMENTATION (OUTPATIENT)
Dept: FAMILY MEDICINE CLINIC | Facility: CLINIC | Age: 83
End: 2023-10-18
Payer: MEDICARE

## 2023-10-18 PROBLEM — N39.0 URINARY TRACT INFECTION: Status: ACTIVE | Noted: 2023-10-18

## 2023-10-18 PROBLEM — M62.81 MUSCLE WEAKNESS: Status: ACTIVE | Noted: 2023-10-18

## 2023-10-18 PROBLEM — G93.40 ENCEPHALOPATHY, UNSPECIFIED: Status: ACTIVE | Noted: 2023-10-18

## 2023-10-18 RX ORDER — AMOXICILLIN AND CLAVULANATE POTASSIUM 500; 125 MG/1; MG/1
1 TABLET, FILM COATED ORAL 2 TIMES DAILY
COMMUNITY

## 2023-10-18 RX ORDER — FAMOTIDINE 40 MG/1
40 TABLET, FILM COATED ORAL DAILY
COMMUNITY

## 2023-10-18 RX ORDER — DOCUSATE SODIUM 100 MG/1
100 CAPSULE, LIQUID FILLED ORAL 2 TIMES DAILY
COMMUNITY

## 2023-10-18 RX ORDER — ACETAMINOPHEN 500 MG
500 TABLET ORAL EVERY 6 HOURS PRN
COMMUNITY

## 2023-10-23 ENCOUNTER — NURSING HOME (OUTPATIENT)
Dept: INTERNAL MEDICINE | Facility: CLINIC | Age: 83
End: 2023-10-23
Payer: MEDICARE

## 2023-10-23 DIAGNOSIS — M62.81 MUSCLE WEAKNESS: ICD-10-CM

## 2023-10-23 DIAGNOSIS — G40.909 SEIZURE DISORDER: ICD-10-CM

## 2023-10-23 DIAGNOSIS — K21.9 GASTROESOPHAGEAL REFLUX DISEASE WITHOUT ESOPHAGITIS: ICD-10-CM

## 2023-10-23 DIAGNOSIS — U07.1 COVID-19 VIRUS INFECTION: Primary | ICD-10-CM

## 2023-10-23 DIAGNOSIS — N30.00 ACUTE CYSTITIS WITHOUT HEMATURIA: ICD-10-CM

## 2023-10-23 DIAGNOSIS — G93.40 ENCEPHALOPATHY, UNSPECIFIED: ICD-10-CM

## 2023-10-23 DIAGNOSIS — Z86.711 HISTORY OF PULMONARY EMBOLUS (PE): ICD-10-CM

## 2023-10-23 DIAGNOSIS — E78.2 MIXED HYPERLIPIDEMIA: ICD-10-CM

## 2023-10-23 DIAGNOSIS — N40.0 BENIGN PROSTATIC HYPERPLASIA, UNSPECIFIED WHETHER LOWER URINARY TRACT SYMPTOMS PRESENT: ICD-10-CM

## 2023-10-23 NOTE — PROGRESS NOTES
Nursing Home History and Physical       Getachew Govea DO  [address] [place] [address] Phone: [phone]  Fax: [phone]     PATIENT NAME: Caesar Kim                                                                          YOB: 1940           DATE OF SERVICE: 10/23/2023  FACILITY:  [facility]    CHIEF COMPLAINT:  Nursing facility admission      HISTORY OF PRESENT ILLNESS:   Patient is an 83-year-old male recently admitted to [facility] for acute encephalopathy.  At baseline, patient was able to pick himself up to standing position with a walker and otherwise needs assistance from family for ADLs.  On the day of admission, wife was concerned about increased confusion.  Patient was found to have Enterococcus UTI and acute dehydration.  This improved with IV fluids and antibiotics.  Due to bilateral lower extremity weakness, work-up with imaging revealed stable compression deformities of T12 and L2.  Recommendations were for follow-up with neurology as outpatient.    On exam today it was noted that patient had been put into isolation for concerns of COVID-19 infection.  Patient was having 1 day of fevers followed by modest URI symptoms with cough and congestion.  Unfortunately he was not a candidate for Paxlovid due to interim actions with multiple medications.  He was quite comfortable otherwise.    PAST MEDICAL & SURGICAL HISTORY:   Past Medical History:   Diagnosis Date    AAA (abdominal aortic aneurysm)     Arthritis     Atrial fibrillation     BPH (benign prostatic hyperplasia)     CHF (congestive heart failure)     COPD (chronic obstructive pulmonary disease)     Diabetes mellitus     Elevated cholesterol     GERD (gastroesophageal reflux disease)     Hypertension     Memory loss     Personal history of pulmonary embolism     Pulmonary embolism     Restless leg syndrome     Sleep apnea     Uses CPAP    Unspecified convulsions     Unspecified diastolic  (congestive) heart failure     Urinary tract infection       Past Surgical History:   Procedure Laterality Date    CHOLECYSTECTOMY      COLONOSCOPY      CYSTOSCOPY TRANSURETHRAL RESECTION OF PROSTATE N/A 2022    Procedure: CYSTOSCOPY TRANSURETHRAL RESECTION OF PROSTATE GREENLIGHT;  Surgeon: Darius Costa MD;  Location: [facility];  Service: Urology;  Laterality: N/A;    ENDOSCOPY      EYE SURGERY Bilateral     CATARACTS    SHOULDER SURGERY Left     SKIN BIOPSY      NECK    TONSILLECTOMY      AND ADENOIDS         MEDICATIONS:  I have reviewed and reconciled the patients medication list in the patients chart at the skilled nursing facility on 10/23/2023.      ALLERGIES:  Allergies   Allergen Reactions    Acyclovir Other (See Comments) and GI Intolerance     Other reaction(s): Other, Other: See Comments      Atorvastatin Myalgia     Other reaction(s): Myalgia, Myalgias (Muscle Pain)      Fenofibrate Myalgia     Other reaction(s): Myalgias (Muscle Pain)  Other reaction(s): Myalgia      Metformin Unknown - High Severity     Other reaction(s): Unknown      Mirabegron Unknown - High Severity     Other reaction(s): Unknown           SOCIAL HISTORY:  Social History     Socioeconomic History    Marital status:    Tobacco Use    Smoking status: Former     Types: Cigarettes     Quit date:      Years since quittin.8    Smokeless tobacco: Former     Quit date:    Substance and Sexual Activity    Alcohol use: Never    Drug use: Not Currently    Sexual activity: Defer       FAMILY HISTORY:  Family History   Problem Relation Age of Onset    Heart attack Other     Tuberculosis Other         REVIEW OF SYSTEMS:  Review of Systems   Constitutional:  Positive for fatigue. Negative for chills and fever.   HENT:  Negative for congestion, ear pain, rhinorrhea, sinus pressure and sore throat.    Eyes:  Negative for visual disturbance.   Respiratory:  Positive for cough. Negative for chest tightness, shortness  of breath and wheezing.    Cardiovascular:  Negative for chest pain, palpitations and leg swelling.   Gastrointestinal:  Negative for abdominal pain, blood in stool, constipation, diarrhea, nausea and vomiting.   Endocrine: Negative for polydipsia and polyuria.   Genitourinary:  Negative for dysuria and hematuria.   Musculoskeletal:  Negative for arthralgias and back pain.   Skin:  Negative for rash.   Neurological:  Negative for dizziness, light-headedness, numbness and headaches.   Psychiatric/Behavioral:  Negative for dysphoric mood and sleep disturbance. The patient is not nervous/anxious.          PHYSICAL EXAMINATION:   VITAL SIGNS: /67   Pulse 88   Temp 100.1 °F (37.8 °C)   Resp 18   Wt 92 kg (202 lb 12.8 oz)   SpO2 92%   BMI 32.24 kg/m²     Physical Exam  Vitals and nursing note reviewed.   Constitutional:       Appearance: Normal appearance. He is well-developed. He is obese.   HENT:      Head: Normocephalic and atraumatic.      Nose: Nose normal.      Mouth/Throat:      Mouth: Mucous membranes are moist.      Pharynx: No oropharyngeal exudate.   Eyes:      General: No scleral icterus.     Conjunctiva/sclera: Conjunctivae normal.      Pupils: Pupils are equal, round, and reactive to light.   Neck:      Thyroid: No thyromegaly.   Cardiovascular:      Rate and Rhythm: Normal rate and regular rhythm.      Heart sounds: Normal heart sounds. No murmur heard.     No friction rub. No gallop.   Pulmonary:      Effort: Pulmonary effort is normal. No respiratory distress.      Breath sounds: Normal breath sounds. No wheezing.   Abdominal:      General: Bowel sounds are normal. There is no distension.      Palpations: Abdomen is soft.      Tenderness: There is no abdominal tenderness.   Musculoskeletal:         General: No deformity or signs of injury.      Cervical back: Normal range of motion and neck supple.   Lymphadenopathy:      Cervical: No cervical adenopathy.   Skin:     General: Skin is warm and  dry.      Findings: No rash.   Neurological:      Mental Status: He is alert and oriented to person, place, and time.   Psychiatric:         Mood and Affect: Mood normal.         Behavior: Behavior normal.         RECORDS REVIEW:   Discharge Summary from [facility] 10/9/2023    ASSESSMENT   Diagnoses and all orders for this visit:    1. COVID-19 virus infection (Primary)    2. Encephalopathy, unspecified    3. Acute cystitis without hematuria    4. Muscle weakness    5. Gastroesophageal reflux disease without esophagitis    6. Mixed hyperlipidemia    7. Seizure disorder    8. Benign prostatic hyperplasia, unspecified whether lower urinary tract symptoms present    9. History of pulmonary embolus (PE)        PLAN  COVID-19 infection  - cont supportive care and isolation in facility per protocol  - tylenol for fever, cough syrup for symptoms.  Patient counseled and reassured.   - not on Paxolovid due to med interaction risks.     Metabolic encephalopathy secondary to UTI  - Patient has completed antibiotic therapy with Augmentin.  - Stable mood and behaviors.  Continue supportive care and rehab facility.  - Continue PT/OT for strengthening.    History of PE  - Stable on Eliquis 5 mg twice daily, however, adjust dose for geriatric dosing to 2.5 mg twice daily. Order changed today.     GERD  - Stable on famotidine    BPH  - Continue finasteride    COPD  - Stable on current inhaler regimen    Seizure disorders  - Continue phenobarbital and Topamax    RLS  - Stable on pramipexole    Hyperlipidemia  - Continue Zetia      50 min spent with direct patient care, review of medical chart, discussion with nursing staff, and medical decision making.       [x]  Discussed Patient in detail with nursing/staff, addressed all needs today.     [x]  Plan of Care Reviewed   [x]  PT/OT Reviewed   [x]  Order Changes  []  Discharge Plans Reviewed  [x]  Advance Directive on file with Nursing Home.   [x]  POA on file with Nursing  Home.    [x]  Code Status listed and reviewed.       Getachew Govea DO.  10/25/2023      **Part of this note may be an electronic transcription/translation of spoken language to printed text using the Dragon Dictation System.**

## 2023-10-23 NOTE — LETTER
Nursing Home History and Physical       Getachew Govea DO  [address] [place] [address] Phone: [phone]  Fax: [phone]     PATIENT NAME: Caesar Kim                                                                          YOB: 1940           DATE OF SERVICE: 10/23/2023  FACILITY:  [facility]    CHIEF COMPLAINT:  Nursing facility admission      HISTORY OF PRESENT ILLNESS:   Patient is an 83-year-old male recently admitted to [facility] for acute encephalopathy.  At baseline, patient was able to pick himself up to standing position with a walker and otherwise needs assistance from family for ADLs.  On the day of admission, wife was concerned about increased confusion.  Patient was found to have Enterococcus UTI and acute dehydration.  This improved with IV fluids and antibiotics.  Due to bilateral lower extremity weakness, work-up with imaging revealed stable compression deformities of T12 and L2.  Recommendations were for follow-up with neurology as outpatient.    On exam today it was noted that patient had been put into isolation for concerns of COVID-19 infection.  Patient was having 1 day of fevers followed by modest URI symptoms with cough and congestion.  Unfortunately he was not a candidate for Paxlovid due to interim actions with multiple medications.  He was quite comfortable otherwise.    PAST MEDICAL & SURGICAL HISTORY:   Past Medical History:   Diagnosis Date    AAA (abdominal aortic aneurysm)     Arthritis     Atrial fibrillation     BPH (benign prostatic hyperplasia)     CHF (congestive heart failure)     COPD (chronic obstructive pulmonary disease)     Diabetes mellitus     Elevated cholesterol     GERD (gastroesophageal reflux disease)     Hypertension     Memory loss     Personal history of pulmonary embolism     Pulmonary embolism     Restless leg syndrome     Sleep apnea     Uses CPAP    Unspecified convulsions     Unspecified diastolic  (congestive) heart failure     Urinary tract infection       Past Surgical History:   Procedure Laterality Date    CHOLECYSTECTOMY      COLONOSCOPY      CYSTOSCOPY TRANSURETHRAL RESECTION OF PROSTATE N/A 2022    Procedure: CYSTOSCOPY TRANSURETHRAL RESECTION OF PROSTATE GREENLIGHT;  Surgeon: Darius Costa MD;  Location: [facility];  Service: Urology;  Laterality: N/A;    ENDOSCOPY      EYE SURGERY Bilateral     CATARACTS    SHOULDER SURGERY Left     SKIN BIOPSY      NECK    TONSILLECTOMY      AND ADENOIDS         MEDICATIONS:  I have reviewed and reconciled the patients medication list in the patients chart at the skilled nursing facility on 10/23/2023.      ALLERGIES:  Allergies   Allergen Reactions    Acyclovir Other (See Comments) and GI Intolerance     Other reaction(s): Other, Other: See Comments      Atorvastatin Myalgia     Other reaction(s): Myalgia, Myalgias (Muscle Pain)      Fenofibrate Myalgia     Other reaction(s): Myalgias (Muscle Pain)  Other reaction(s): Myalgia      Metformin Unknown - High Severity     Other reaction(s): Unknown      Mirabegron Unknown - High Severity     Other reaction(s): Unknown           SOCIAL HISTORY:  Social History     Socioeconomic History    Marital status:    Tobacco Use    Smoking status: Former     Types: Cigarettes     Quit date:      Years since quittin.8    Smokeless tobacco: Former     Quit date:    Substance and Sexual Activity    Alcohol use: Never    Drug use: Not Currently    Sexual activity: Defer       FAMILY HISTORY:  Family History   Problem Relation Age of Onset    Heart attack Other     Tuberculosis Other         REVIEW OF SYSTEMS:  Review of Systems   Constitutional:  Positive for fatigue. Negative for chills and fever.   HENT:  Negative for congestion, ear pain, rhinorrhea, sinus pressure and sore throat.    Eyes:  Negative for visual disturbance.   Respiratory:  Positive for cough. Negative for chest tightness, shortness  of breath and wheezing.    Cardiovascular:  Negative for chest pain, palpitations and leg swelling.   Gastrointestinal:  Negative for abdominal pain, blood in stool, constipation, diarrhea, nausea and vomiting.   Endocrine: Negative for polydipsia and polyuria.   Genitourinary:  Negative for dysuria and hematuria.   Musculoskeletal:  Negative for arthralgias and back pain.   Skin:  Negative for rash.   Neurological:  Negative for dizziness, light-headedness, numbness and headaches.   Psychiatric/Behavioral:  Negative for dysphoric mood and sleep disturbance. The patient is not nervous/anxious.          PHYSICAL EXAMINATION:   VITAL SIGNS: /67   Pulse 88   Temp 100.1 °F (37.8 °C)   Resp 18   Wt 92 kg (202 lb 12.8 oz)   SpO2 92%   BMI 32.24 kg/m²     Physical Exam  Vitals and nursing note reviewed.   Constitutional:       Appearance: Normal appearance. He is well-developed. He is obese.   HENT:      Head: Normocephalic and atraumatic.      Nose: Nose normal.      Mouth/Throat:      Mouth: Mucous membranes are moist.      Pharynx: No oropharyngeal exudate.   Eyes:      General: No scleral icterus.     Conjunctiva/sclera: Conjunctivae normal.      Pupils: Pupils are equal, round, and reactive to light.   Neck:      Thyroid: No thyromegaly.   Cardiovascular:      Rate and Rhythm: Normal rate and regular rhythm.      Heart sounds: Normal heart sounds. No murmur heard.     No friction rub. No gallop.   Pulmonary:      Effort: Pulmonary effort is normal. No respiratory distress.      Breath sounds: Normal breath sounds. No wheezing.   Abdominal:      General: Bowel sounds are normal. There is no distension.      Palpations: Abdomen is soft.      Tenderness: There is no abdominal tenderness.   Musculoskeletal:         General: No deformity or signs of injury.      Cervical back: Normal range of motion and neck supple.   Lymphadenopathy:      Cervical: No cervical adenopathy.   Skin:     General: Skin is warm and  dry.      Findings: No rash.   Neurological:      Mental Status: He is alert and oriented to person, place, and time.   Psychiatric:         Mood and Affect: Mood normal.         Behavior: Behavior normal.         RECORDS REVIEW:   Discharge Summary from [facility] 10/9/2023    ASSESSMENT   Diagnoses and all orders for this visit:    1. COVID-19 virus infection (Primary)    2. Encephalopathy, unspecified    3. Acute cystitis without hematuria    4. Muscle weakness    5. Gastroesophageal reflux disease without esophagitis    6. Mixed hyperlipidemia    7. Seizure disorder    8. Benign prostatic hyperplasia, unspecified whether lower urinary tract symptoms present    9. History of pulmonary embolus (PE)        PLAN  COVID-19 infection  - cont supportive care and isolation in facility per protocol  - tylenol for fever, cough syrup for symptoms.  Patient counseled and reassured.   - not on Paxolovid due to med interaction risks.     Metabolic encephalopathy secondary to UTI  - Patient has completed antibiotic therapy with Augmentin.  - Stable mood and behaviors.  Continue supportive care and rehab facility.  - Continue PT/OT for strengthening.    History of PE  - Stable on Eliquis 5 mg twice daily, however, adjust dose for geriatric dosing to 2.5 mg twice daily. Order changed today.     GERD  - Stable on famotidine    BPH  - Continue finasteride    COPD  - Stable on current inhaler regimen    Seizure disorders  - Continue phenobarbital and Topamax    RLS  - Stable on pramipexole    Hyperlipidemia  - Continue Zetia      50 min spent with direct patient care, review of medical chart, discussion with nursing staff, and medical decision making.       [x]  Discussed Patient in detail with nursing/staff, addressed all needs today.     [x]  Plan of Care Reviewed   [x]  PT/OT Reviewed   [x]  Order Changes  []  Discharge Plans Reviewed  [x]  Advance Directive on file with Nursing Home.   [x]  POA on file with Nursing  Home.    [x]  Code Status listed and reviewed.       Getachew Govea DO.  10/25/2023      **Part of this note may be an electronic transcription/translation of spoken language to printed text using the Dragon Dictation System.**

## 2023-10-24 VITALS
RESPIRATION RATE: 18 BRPM | DIASTOLIC BLOOD PRESSURE: 67 MMHG | SYSTOLIC BLOOD PRESSURE: 124 MMHG | TEMPERATURE: 100.1 F | HEART RATE: 88 BPM | WEIGHT: 202.8 LBS | OXYGEN SATURATION: 92 % | BODY MASS INDEX: 32.24 KG/M2

## 2023-11-07 ENCOUNTER — HOSPITAL ENCOUNTER (INPATIENT)
Facility: HOSPITAL | Age: 83
LOS: 1 days | Discharge: NURSING, REHAB OR LONG-TERM CARE | DRG: 565 | End: 2023-11-10
Attending: EMERGENCY MEDICINE | Admitting: STUDENT IN AN ORGANIZED HEALTH CARE EDUCATION/TRAINING PROGRAM
Payer: MEDICARE

## 2023-11-07 ENCOUNTER — APPOINTMENT (OUTPATIENT)
Dept: CT IMAGING | Facility: HOSPITAL | Age: 83
DRG: 565 | End: 2023-11-07
Payer: MEDICARE

## 2023-11-07 ENCOUNTER — APPOINTMENT (OUTPATIENT)
Dept: GENERAL RADIOLOGY | Facility: HOSPITAL | Age: 83
DRG: 565 | End: 2023-11-07
Payer: MEDICARE

## 2023-11-07 DIAGNOSIS — T79.6XXA TRAUMATIC RHABDOMYOLYSIS, INITIAL ENCOUNTER: ICD-10-CM

## 2023-11-07 DIAGNOSIS — S70.01XA CONTUSION OF RIGHT HIP, INITIAL ENCOUNTER: ICD-10-CM

## 2023-11-07 DIAGNOSIS — M25.551 ARTHRALGIA OF RIGHT HIP: Primary | ICD-10-CM

## 2023-11-07 DIAGNOSIS — E86.0 MILD DEHYDRATION: ICD-10-CM

## 2023-11-07 DIAGNOSIS — G62.9 NEUROPATHY: ICD-10-CM

## 2023-11-07 DIAGNOSIS — R41.0 CONFUSION: ICD-10-CM

## 2023-11-07 PROBLEM — I48.91 ATRIAL FIBRILLATION: Status: ACTIVE | Noted: 2023-11-07

## 2023-11-07 PROBLEM — R41.82 ALTERED MENTAL STATUS: Status: ACTIVE | Noted: 2023-11-07

## 2023-11-07 PROBLEM — I10 ESSENTIAL HYPERTENSION: Status: ACTIVE | Noted: 2023-11-07

## 2023-11-07 PROBLEM — M62.82 RHABDOMYOLYSIS: Status: ACTIVE | Noted: 2023-11-07
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
## 2023-11-07 LAB
ALBUMIN SERPL-MCNC: 3.9 G/DL (ref 3.5–5.2)
ALBUMIN/GLOB SERPL: 1.1 G/DL
ALP SERPL-CCNC: 62 U/L (ref 39–117)
ALT SERPL W P-5'-P-CCNC: 18 U/L (ref 1–41)
AMPHET+METHAMPHET UR QL: NEGATIVE
AMPHETAMINES UR QL: NEGATIVE
ANION GAP SERPL CALCULATED.3IONS-SCNC: 9 MMOL/L (ref 5–15)
AST SERPL-CCNC: 34 U/L (ref 1–40)
BACTERIA UR QL AUTO: ABNORMAL /HPF
BARBITURATES UR QL SCN: POSITIVE
BASOPHILS # BLD AUTO: 0.07 10*3/MM3 (ref 0–0.2)
BASOPHILS NFR BLD AUTO: 1.1 % (ref 0–1.5)
BENZODIAZ UR QL SCN: NEGATIVE
BILIRUB SERPL-MCNC: 0.6 MG/DL (ref 0–1.2)
BILIRUB UR QL STRIP: NEGATIVE
BUN SERPL-MCNC: 13 MG/DL (ref 8–23)
BUN/CREAT SERPL: 15.5 (ref 7–25)
BUPRENORPHINE SERPL-MCNC: NEGATIVE NG/ML
CALCIUM SPEC-SCNC: 9.5 MG/DL (ref 8.6–10.5)
CANNABINOIDS SERPL QL: NEGATIVE
CHLORIDE SERPL-SCNC: 104 MMOL/L (ref 98–107)
CK SERPL-CCNC: 530 U/L (ref 20–200)
CLARITY UR: CLEAR
CO2 SERPL-SCNC: 28 MMOL/L (ref 22–29)
COCAINE UR QL: NEGATIVE
COLOR UR: YELLOW
CREAT SERPL-MCNC: 0.84 MG/DL (ref 0.76–1.27)
DEPRECATED RDW RBC AUTO: 62.6 FL (ref 37–54)
EGFRCR SERPLBLD CKD-EPI 2021: 86.5 ML/MIN/1.73
EOSINOPHIL # BLD AUTO: 0.18 10*3/MM3 (ref 0–0.4)
EOSINOPHIL NFR BLD AUTO: 2.7 % (ref 0.3–6.2)
ERYTHROCYTE [DISTWIDTH] IN BLOOD BY AUTOMATED COUNT: 19.1 % (ref 12.3–15.4)
ETHANOL BLD-MCNC: <10 MG/DL (ref 0–10)
FENTANYL UR-MCNC: POSITIVE NG/ML
GLOBULIN UR ELPH-MCNC: 3.6 GM/DL
GLUCOSE SERPL-MCNC: 124 MG/DL (ref 65–99)
GLUCOSE UR STRIP-MCNC: NEGATIVE MG/DL
HCT VFR BLD AUTO: 46.7 % (ref 37.5–51)
HGB BLD-MCNC: 14.7 G/DL (ref 13–17.7)
HGB UR QL STRIP.AUTO: NEGATIVE
HYALINE CASTS UR QL AUTO: ABNORMAL /LPF
IMM GRANULOCYTES # BLD AUTO: 0.07 10*3/MM3 (ref 0–0.05)
IMM GRANULOCYTES NFR BLD AUTO: 1.1 % (ref 0–0.5)
KETONES UR QL STRIP: NEGATIVE
LEUKOCYTE ESTERASE UR QL STRIP.AUTO: ABNORMAL
LYMPHOCYTES # BLD AUTO: 1.3 10*3/MM3 (ref 0.7–3.1)
LYMPHOCYTES NFR BLD AUTO: 19.5 % (ref 19.6–45.3)
MCH RBC QN AUTO: 28.6 PG (ref 26.6–33)
MCHC RBC AUTO-ENTMCNC: 31.5 G/DL (ref 31.5–35.7)
MCV RBC AUTO: 90.9 FL (ref 79–97)
METHADONE UR QL SCN: NEGATIVE
MONOCYTES # BLD AUTO: 0.83 10*3/MM3 (ref 0.1–0.9)
MONOCYTES NFR BLD AUTO: 12.5 % (ref 5–12)
NEUTROPHILS NFR BLD AUTO: 4.21 10*3/MM3 (ref 1.7–7)
NEUTROPHILS NFR BLD AUTO: 63.1 % (ref 42.7–76)
NITRITE UR QL STRIP: NEGATIVE
NRBC BLD AUTO-RTO: 0 /100 WBC (ref 0–0.2)
OPIATES UR QL: NEGATIVE
OXYCODONE UR QL SCN: NEGATIVE
PCP UR QL SCN: NEGATIVE
PH UR STRIP.AUTO: <=5 [PH] (ref 5–8)
PHENOBARB SERPL-MCNC: 11.5 MCG/ML (ref 10–30)
PLATELET # BLD AUTO: 283 10*3/MM3 (ref 140–450)
PMV BLD AUTO: 10.7 FL (ref 6–12)
POTASSIUM SERPL-SCNC: 4.2 MMOL/L (ref 3.5–5.2)
PROT SERPL-MCNC: 7.5 G/DL (ref 6–8.5)
PROT UR QL STRIP: NEGATIVE
RBC # BLD AUTO: 5.14 10*6/MM3 (ref 4.14–5.8)
RBC # UR STRIP: ABNORMAL /HPF
REF LAB TEST METHOD: ABNORMAL
SODIUM SERPL-SCNC: 141 MMOL/L (ref 136–145)
SP GR UR STRIP: 1.01 (ref 1–1.03)
SQUAMOUS #/AREA URNS HPF: ABNORMAL /HPF
TRICYCLICS UR QL SCN: NEGATIVE
UROBILINOGEN UR QL STRIP: ABNORMAL
WBC # UR STRIP: ABNORMAL /HPF
WBC NRBC COR # BLD: 6.66 10*3/MM3 (ref 3.4–10.8)

## 2023-11-07 PROCEDURE — 85025 COMPLETE CBC W/AUTO DIFF WBC: CPT | Performed by: EMERGENCY MEDICINE

## 2023-11-07 PROCEDURE — 99285 EMERGENCY DEPT VISIT HI MDM: CPT

## 2023-11-07 PROCEDURE — 87086 URINE CULTURE/COLONY COUNT: CPT | Performed by: HOSPITALIST

## 2023-11-07 PROCEDURE — 25810000003 SODIUM CHLORIDE 0.9 % SOLUTION: Performed by: HOSPITALIST

## 2023-11-07 PROCEDURE — 99223 1ST HOSP IP/OBS HIGH 75: CPT | Performed by: HOSPITALIST

## 2023-11-07 PROCEDURE — 73502 X-RAY EXAM HIP UNI 2-3 VIEWS: CPT

## 2023-11-07 PROCEDURE — 80053 COMPREHEN METABOLIC PANEL: CPT | Performed by: EMERGENCY MEDICINE

## 2023-11-07 PROCEDURE — 36415 COLL VENOUS BLD VENIPUNCTURE: CPT

## 2023-11-07 PROCEDURE — 70450 CT HEAD/BRAIN W/O DYE: CPT

## 2023-11-07 PROCEDURE — 82077 ASSAY SPEC XCP UR&BREATH IA: CPT | Performed by: EMERGENCY MEDICINE

## 2023-11-07 PROCEDURE — 80184 ASSAY OF PHENOBARBITAL: CPT | Performed by: EMERGENCY MEDICINE

## 2023-11-07 PROCEDURE — 82550 ASSAY OF CK (CPK): CPT | Performed by: EMERGENCY MEDICINE

## 2023-11-07 PROCEDURE — 81001 URINALYSIS AUTO W/SCOPE: CPT | Performed by: EMERGENCY MEDICINE

## 2023-11-07 PROCEDURE — G0378 HOSPITAL OBSERVATION PER HR: HCPCS

## 2023-11-07 PROCEDURE — 72192 CT PELVIS W/O DYE: CPT

## 2023-11-07 PROCEDURE — 80307 DRUG TEST PRSMV CHEM ANLYZR: CPT | Performed by: EMERGENCY MEDICINE

## 2023-11-07 RX ORDER — POLYETHYLENE GLYCOL 3350 17 G/17G
17 POWDER, FOR SOLUTION ORAL DAILY PRN
Status: DISCONTINUED | OUTPATIENT
Start: 2023-11-07 | End: 2023-11-10 | Stop reason: HOSPADM

## 2023-11-07 RX ORDER — ACETAMINOPHEN 325 MG/1
650 TABLET ORAL EVERY 4 HOURS PRN
Status: DISCONTINUED | OUTPATIENT
Start: 2023-11-07 | End: 2023-11-10 | Stop reason: HOSPADM

## 2023-11-07 RX ORDER — PANTOPRAZOLE SODIUM 40 MG/1
40 TABLET, DELAYED RELEASE ORAL
Status: DISCONTINUED | OUTPATIENT
Start: 2023-11-08 | End: 2023-11-10 | Stop reason: HOSPADM

## 2023-11-07 RX ORDER — BISACODYL 10 MG
10 SUPPOSITORY, RECTAL RECTAL DAILY PRN
Status: DISCONTINUED | OUTPATIENT
Start: 2023-11-07 | End: 2023-11-10 | Stop reason: HOSPADM

## 2023-11-07 RX ORDER — LOSARTAN POTASSIUM 25 MG/1
25 TABLET ORAL DAILY
Status: DISCONTINUED | OUTPATIENT
Start: 2023-11-08 | End: 2023-11-10 | Stop reason: HOSPADM

## 2023-11-07 RX ORDER — LACOSAMIDE 100 MG/1
100 TABLET ORAL DAILY
Status: DISCONTINUED | OUTPATIENT
Start: 2023-11-08 | End: 2023-11-10 | Stop reason: HOSPADM

## 2023-11-07 RX ORDER — FAMOTIDINE 20 MG/1
40 TABLET, FILM COATED ORAL DAILY
Status: DISCONTINUED | OUTPATIENT
Start: 2023-11-08 | End: 2023-11-10 | Stop reason: HOSPADM

## 2023-11-07 RX ORDER — SODIUM CHLORIDE 0.9 % (FLUSH) 0.9 %
10 SYRINGE (ML) INJECTION EVERY 12 HOURS SCHEDULED
Status: DISCONTINUED | OUTPATIENT
Start: 2023-11-07 | End: 2023-11-10 | Stop reason: HOSPADM

## 2023-11-07 RX ORDER — CHOLECALCIFEROL (VITAMIN D3) 125 MCG
5 CAPSULE ORAL NIGHTLY PRN
Status: DISCONTINUED | OUTPATIENT
Start: 2023-11-07 | End: 2023-11-10 | Stop reason: HOSPADM

## 2023-11-07 RX ORDER — FINASTERIDE 5 MG/1
5 TABLET, FILM COATED ORAL DAILY
Status: DISCONTINUED | OUTPATIENT
Start: 2023-11-08 | End: 2023-11-10 | Stop reason: HOSPADM

## 2023-11-07 RX ORDER — ONDANSETRON 2 MG/ML
4 INJECTION INTRAMUSCULAR; INTRAVENOUS EVERY 6 HOURS PRN
Status: DISCONTINUED | OUTPATIENT
Start: 2023-11-07 | End: 2023-11-10 | Stop reason: HOSPADM

## 2023-11-07 RX ORDER — ONDANSETRON 4 MG/1
4 TABLET, FILM COATED ORAL EVERY 6 HOURS PRN
Status: DISCONTINUED | OUTPATIENT
Start: 2023-11-07 | End: 2023-11-10 | Stop reason: HOSPADM

## 2023-11-07 RX ORDER — SODIUM CHLORIDE 9 MG/ML
250 INJECTION, SOLUTION INTRAVENOUS CONTINUOUS
Status: DISCONTINUED | OUTPATIENT
Start: 2023-11-07 | End: 2023-11-08

## 2023-11-07 RX ORDER — SODIUM CHLORIDE 9 MG/ML
40 INJECTION, SOLUTION INTRAVENOUS AS NEEDED
Status: DISCONTINUED | OUTPATIENT
Start: 2023-11-07 | End: 2023-11-10 | Stop reason: HOSPADM

## 2023-11-07 RX ORDER — SODIUM CHLORIDE 0.9 % (FLUSH) 0.9 %
10 SYRINGE (ML) INJECTION AS NEEDED
Status: DISCONTINUED | OUTPATIENT
Start: 2023-11-07 | End: 2023-11-10 | Stop reason: HOSPADM

## 2023-11-07 RX ORDER — AMOXICILLIN 250 MG
2 CAPSULE ORAL 2 TIMES DAILY
Status: DISCONTINUED | OUTPATIENT
Start: 2023-11-07 | End: 2023-11-10 | Stop reason: HOSPADM

## 2023-11-07 RX ORDER — SODIUM CHLORIDE 9 MG/ML
125 INJECTION, SOLUTION INTRAVENOUS CONTINUOUS
Status: DISCONTINUED | OUTPATIENT
Start: 2023-11-07 | End: 2023-11-10 | Stop reason: HOSPADM

## 2023-11-07 RX ORDER — BISACODYL 5 MG/1
5 TABLET, DELAYED RELEASE ORAL DAILY PRN
Status: DISCONTINUED | OUTPATIENT
Start: 2023-11-07 | End: 2023-11-10 | Stop reason: HOSPADM

## 2023-11-07 RX ORDER — PHENOBARBITAL 32.4 MG/1
32.4 TABLET ORAL NIGHTLY
Status: DISCONTINUED | OUTPATIENT
Start: 2023-11-07 | End: 2023-11-10

## 2023-11-07 RX ADMIN — SODIUM CHLORIDE 125 ML/HR: 9 INJECTION, SOLUTION INTRAVENOUS at 19:47

## 2023-11-07 RX ADMIN — Medication 10 ML: at 23:27

## 2023-11-07 RX ADMIN — APIXABAN 5 MG: 5 TABLET, FILM COATED ORAL at 23:25

## 2023-11-07 RX ADMIN — PHENOBARBITAL 32.4 MG: 32.4 TABLET ORAL at 23:25

## 2023-11-07 RX ADMIN — Medication 10 ML: at 19:48

## 2023-11-07 RX ADMIN — PRAMIPEXOLE DIHYDROCHLORIDE 1.5 MG: 1 TABLET ORAL at 23:25

## 2023-11-07 NOTE — Clinical Note
Level of Care: Telemetry [5]   Diagnosis: Rhabdomyolysis [728.88.ICD-9-CM]   Admitting Physician: GAYATHRI MANCIA [[id number]]   Attending Physician: GAYATHRI MANCIA [[id number]]

## 2023-11-07 NOTE — ED NOTES
Caesar Kim    Nursing Report ED to Floor:  Mental status: AMS  Ambulatory status: assist x2  Oxygen Therapy:  RA  Cardiac Rhythm: NSR  Admitted from: Home  Safety Concerns:  Fall Risk  Social Issues: Unknown  ED Room #:  28    ED Nurse Phone Extension - [phone] or may call [phone].      HPI:   Chief Complaint   Patient presents with    Fall    Hip Injury       Past Medical History:  Past Medical History:   Diagnosis Date    AAA (abdominal aortic aneurysm)     Arthritis     Atrial fibrillation     BPH (benign prostatic hyperplasia)     CHF (congestive heart failure)     COPD (chronic obstructive pulmonary disease)     Diabetes mellitus     Elevated cholesterol     GERD (gastroesophageal reflux disease)     Hypertension     Memory loss     Personal history of pulmonary embolism     Pulmonary embolism     Restless leg syndrome     Sleep apnea     Uses CPAP    Unspecified convulsions     Unspecified diastolic (congestive) heart failure     Urinary tract infection         Past Surgical History:  Past Surgical History:   Procedure Laterality Date    CHOLECYSTECTOMY      COLONOSCOPY      CYSTOSCOPY TRANSURETHRAL RESECTION OF PROSTATE N/A 12/2/2022    Procedure: CYSTOSCOPY TRANSURETHRAL RESECTION OF PROSTATE GREENLIGHT;  Surgeon: Darius Costa MD;  [facility];  Service: Urology;  Laterality: N/A;    ENDOSCOPY      EYE SURGERY Bilateral     CATARACTS    SHOULDER SURGERY Left     SKIN BIOPSY      NECK    TONSILLECTOMY      AND ADENOIDS        Admitting Doctor:   Jonna GALLARDO MD    Consulting Provider(s):  Consults       No orders found from 10/9/2023 to 11/8/2023.             Admitting Diagnosis:   The primary encounter diagnosis was Arthralgia of right hip. Diagnoses of Contusion of right hip, initial encounter, Traumatic rhabdomyolysis, initial encounter, Confusion, and Mild dehydration were also pertinent to this visit.    Most Recent Vitals:   Vitals:    11/07/23 1528 11/07/23 1601   BP:  155/81   Pulse:  "74 61   Resp: 18    Temp: 97.9 °F (36.6 °C)    TempSrc: Oral    SpO2: 93% 94%   Weight: 90.7 kg (200 lb)    Height: 170.2 cm (67\")        Active LDAs/IV Access:   Lines, Drains & Airways       Active LDAs       Name Placement date Placement time Site Days    Urethral Catheter Latex 20 Fr. 12/02/22  --  -- 340                    Labs (abnormal labs have a star):   Labs Reviewed   COMPREHENSIVE METABOLIC PANEL - Abnormal; Notable for the following components:       Result Value    Glucose 124 (*)     All other components within normal limits    Narrative:     GFR Normal >60  Chronic Kidney Disease <60  Kidney Failure <15    The GFR formula is only valid for adults with stable renal function between ages 18 and 70.   CK - Abnormal; Notable for the following components:    Creatine Kinase 530 (*)     All other components within normal limits   CBC WITH AUTO DIFFERENTIAL - Abnormal; Notable for the following components:    RDW 19.1 (*)     RDW-SD 62.6 (*)     Lymphocyte % 19.5 (*)     Monocyte % 12.5 (*)     Immature Grans % 1.1 (*)     Immature Grans, Absolute 0.07 (*)     All other components within normal limits   URINALYSIS W/ MICROSCOPIC IF INDICATED (NO CULTURE) - Abnormal; Notable for the following components:    Leuk Esterase, UA Moderate (2+) (*)     All other components within normal limits   URINALYSIS, MICROSCOPIC ONLY - Abnormal; Notable for the following components:    WBC, UA Too Numerous to Count (*)     All other components within normal limits   PHENOBARBITAL LEVEL - Normal   ETHANOL   URINE DRUG SCREEN   URINALYSIS W/ CULTURE IF INDICATED   CBC AND DIFFERENTIAL    Narrative:     The following orders were created for panel order CBC & Differential.  Procedure                               Abnormality         Status                     ---------                               -----------         ------                     CBC Auto Differential[[id number]]        Abnormal            Final result             "     Please view results for these tests on the individual orders.       Meds Given in ED:   Medications   sodium chloride 0.9 % infusion (has no administration in time range)     sodium chloride, 250 mL/hr

## 2023-11-07 NOTE — H&P
[facility] Medicine Services  HISTORY AND PHYSICAL    Patient Name: Caesar Kim  : 1940  MRN: [id number]  Primary Care Physician: Provider, No Known  Date of admission: 2023      Subjective   Subjective     Chief Complaint:  AMS, fall, right hip pain    HPI:  Caesar Kim is a 83 y.o. male with hx of HTN, HLD, chronic diastolic CHF, COPD, GERD, PE, Afib on Eliquis, BPH, RLS, seizure disorder, and memory loss who presents from home due to a fall with right hip pain along with altered mental status. Patient fell at some point in the morning 23 and laid there for about 24 hours until home health arrived 23 at which time he was brought to the ER. Wife apparently fed him meals on the floor. She denies any seizure activity. He did have urinary incontinence however. Wife reports he has had several days now of increasing AMS. Workup in the ER was overall unremarkable aside from elevated CK level concerning for rhabdomyolysis. Admitted for further management.     Patient with hx of frequent falls per chart review. He had a fall in 2023 with left sided rib fractures, again had a fall with syncope in 2023 with workup showing a UTI, discharged on Bactrim for a UTI. He was hospitalized at [facility] 10/5-10/9/23 due to AMS and Enterococcus faecalis UTI, discharged on Augmentin. Appears he was at [facility] for rehab and per wife he has been home for about 1 week now. A note from Dr. Govea at the nursing facility on 10/23/23 reflects patient had COVID but was not a candidate for Paxlovid due to drug interactions with his medications.      Personal History     Past Medical History:   Diagnosis Date    AAA (abdominal aortic aneurysm)     Arthritis     Atrial fibrillation     BPH (benign prostatic hyperplasia)     CHF (congestive heart failure)     COPD (chronic obstructive pulmonary disease)     Diabetes mellitus     Elevated cholesterol     GERD (gastroesophageal  reflux disease)     Hypertension     Memory loss     Personal history of pulmonary embolism     Pulmonary embolism     Restless leg syndrome     Sleep apnea     Uses CPAP    Unspecified convulsions     Unspecified diastolic (congestive) heart failure     Urinary tract infection              Past Surgical History:   Procedure Laterality Date    CHOLECYSTECTOMY      COLONOSCOPY      CYSTOSCOPY TRANSURETHRAL RESECTION OF PROSTATE N/A 12/2/2022    Procedure: CYSTOSCOPY TRANSURETHRAL RESECTION OF PROSTATE GREENLIGHT;  Surgeon: Darius Costa MD;  Location: [facility];  Service: Urology;  Laterality: N/A;    ENDOSCOPY      EYE SURGERY Bilateral     CATARACTS    SHOULDER SURGERY Left     SKIN BIOPSY      NECK    TONSILLECTOMY      AND ADENOIDS       Family History: family history includes Heart attack in an other family member; Tuberculosis in an other family member.     Social History:  reports that he quit smoking about 21 years ago. His smoking use included cigarettes. He quit smokeless tobacco use about 21 years ago. He reports that he does not currently use drugs. He reports that he does not drink alcohol.  Social History     Social History Narrative    Not on file       Medications:  Available home medication information reviewed.  (Not in a hospital admission)      Allergies   Allergen Reactions    Acyclovir Other (See Comments) and GI Intolerance     Other reaction(s): Other, Other: See Comments      Atorvastatin Myalgia     Other reaction(s): Myalgia, Myalgias (Muscle Pain)      Fenofibrate Myalgia     Other reaction(s): Myalgias (Muscle Pain)  Other reaction(s): Myalgia      Metformin Unknown - High Severity     Other reaction(s): Unknown      Mirabegron Unknown - High Severity     Other reaction(s): Unknown         Objective   Objective     Vital Signs:   Temp:  [97.9 °F (36.6 °C)] 97.9 °F (36.6 °C)  Heart Rate:  [74] 74  Resp:  [18] 18       Physical Exam   Constitutional: Awake, alert, chronically ill  appearing  Eyes: PERRLA, sclerae anicteric, no conjunctival injection  HENT: NCAT, mucous membranes moist  Neck: Supple, no thyromegaly, no lymphadenopathy, trachea midline  Respiratory: Clear to auscultation bilaterally, nonlabored respirations   Cardiovascular: RRR, no murmurs, rubs, or gallops, palpable pedal pulses bilaterally  Gastrointestinal: Positive bowel sounds, soft, nontender, nondistended  Musculoskeletal: No bilateral ankle edema, no clubbing or cyanosis to extremities  Psychiatric: Appropriate affect, cooperative  Neurologic: Oriented x 1 currently (self), strength symmetric in all extremities, Cranial Nerves grossly intact to confrontation, speech mumbled at times and difficult to understand  Skin: No rashes      Result Review:  I have personally reviewed the results from the time of this admission to 11/7/2023 15:53 EST and agree with these findings:  [x]  Laboratory list / accordion  []  Microbiology  [x]  Radiology  []  EKG/Telemetry   []  Cardiology/Vascular   []  Pathology  [x]  Old records  []  Other:        LAB RESULTS:      Lab 11/07/23  1427   WBC 6.66   HEMOGLOBIN 14.7   HEMATOCRIT 46.7   PLATELETS 283   NEUTROS ABS 4.21   IMMATURE GRANS (ABS) 0.07*   LYMPHS ABS 1.30   MONOS ABS 0.83   EOS ABS 0.18   MCV 90.9         Lab 11/07/23  1427   SODIUM 141   POTASSIUM 4.2   CHLORIDE 104   CO2 28.0   ANION GAP 9.0   BUN 13   CREATININE 0.84   EGFR 86.5   GLUCOSE 124*   CALCIUM 9.5         Lab 11/07/23  1427   TOTAL PROTEIN 7.5   ALBUMIN 3.9   GLOBULIN 3.6   ALT (SGPT) 18   AST (SGOT) 34   BILIRUBIN 0.6   ALK PHOS 62                     UA          11/7/2023    15:26   Urinalysis   Squamous Epithelial Cells, UA None Seen    Specific [place], UA 1.011    Ketones, UA Negative    Blood, UA Negative    Leukocytes, UA Moderate (2+)    Nitrite, UA Negative    RBC, UA 0-2    WBC, UA Too Numerous to Count    Bacteria, UA None Seen        Microbiology Results (last 10 days)       ** No results found for the  last 240 hours. **            CT Pelvis Without Contrast    Result Date: 11/7/2023  CT PELVIS WO CONTRAST Date of Exam: 11/7/2023 1:34 PM CST Indication: right hip pain, neg xray. Comparison: None available. Technique: Axial CT images were obtained of the pelvis without contrast administration.  Reconstructed coronal and sagittal images were also obtained. Automated exposure control and iterative construction methods were used. Findings: GASTROINTESTINAL:  Visualized portions are unremarkable. LYMPH NODES:  Unremarkable REPRODUCTIVE: Irregular prostate gland impresses upon the urinary bladder base. BLADDER:  Unremarkable OSSEUS STRUCTURES: No acute fracture identified. There is bilateral mild to moderate hip degenerative arthrosis and degenerative changes of the visualized lumbar spine. There is minimal subcutaneous edema/ecchymosis along the outer margin of the right greater trochanter. No discrete hematoma. There is a bifurcated abdominal aortic stent graft     Impression: Impression: 1.No acute osseous injury is identified. Electronically Signed: Uriel Groves MD  11/7/2023 1:48 PM CST  Workstation ID: [id number]    CT Head Without Contrast    Result Date: 11/7/2023  CT HEAD WO CONTRAST Date of Exam: 11/7/2023 1:34 PM CST Indication: fall, eliquis. Comparison: 5/2/2023 Technique: Axial CT images were obtained of the head without contrast administration.  Automated exposure control and iterative construction methods were used. Findings: There is no evidence of acute territorial infarction. There there is no acute intracranial hemorrhage. There are no extra-axial collections. Ventricles and CSF spaces are symmetric. No mass effect nor hydrocephalus. Brain parenchyma appears normal for age with moderate nonspecific white matter changes likely reflective of microvascular ischemic disease..  Paranasal sinuses and mastoid air cells are adequately aerated.  Osseous structures and orbits appear intact.     Impression:  Impression: No acute intracranial process identified. Electronically Signed: Uriel Groves MD  11/7/2023 1:44 PM CST  Workstation ID: [id number]    XR Hip With or Without Pelvis 2 - 3 View Right    Result Date: 11/7/2023  XR HIP W OR WO PELVIS 2-3 VIEW RIGHT Date of Exam: 11/7/2023 1:27 PM EST Indication: fall, pain Comparison: None available. Findings: The right hip joint is congruent without evidence of acute fracture or traumatic malalignment. Mild degenerative change of the right hip joint. No acute osseous findings in the pelvis. Grossly unremarkable appearance of the partially valuated left hip. Aortobiiliac stent graft is noted. A couple surgical clips are seen in the lower central pelvis.     Impression: Impression: No acute fracture or malalignment. Electronically Signed: Ramu Ocampo MD  11/7/2023 2:25 PM EST  Workstation ID: [id number]         Assessment & Plan   Assessment & Plan     Active Hospital Problems    Diagnosis  POA    **Rhabdomyolysis [M62.82]  Yes    Fall [W19.XXXA]  Yes    Right hip pain [M25.551]  Yes    Altered mental status [R41.82]  Yes    Essential hypertension [I10]  Yes    Atrial fibrillation [I48.91]  Yes       82 yo M with hx of HTN, HLD, chronic diastolic CHF, COPD, GERD, PE, Afib on Eliquis, BPH, RLS, seizure disorder, and memory loss who presents from home due to a fall with right hip pain along with altered mental status. Patient fell at some point in the morning 11/6/23 and laid there for about 24 hours until home health arrived 11/7/23 at which time he was brought to the ER. CT head, x-ray right hip, and CT pelvis were all negative for acute findings. Labs showed elevated , otherwise unremarkable. Admitted for further management.     Rhabdomyolysis  Fall  Right hip pain  -- on admission  --UA with moderate leukocytes and TNTC WBC, no bacteria  --Continue NS at 125 cc/hr  --Trend CK level  --PT/OT evaluation    Altered mental status  Underlying memory loss/mild  cognitive impairment  --Presume secondary to dehydration, rhabdomyolysis   --UA with moderate leukocytes and TNTC WBC, no bacteria; did have recent Enterococcus faecalis UTI treated at [facility]  --Will add on urine culture, may need to treat if positive but given that he is afebrile with normal WBC will defer ATBx at this time  --Monitor for improvement    Recent COVID-19  --Per [facility] Lan note from Dr. Govea 10/23/23, patient had one day of fevers followed by modest URI symptoms, was not a candidate for Paxlovid due to interactions with his other medications  --Has been more than two weeks since his diagnosis, out of isolation    HTN  HLD  Chronic diastolic CHF  --Continue home meds but hold Lasix for now    Afib  Hx of PE  --Continue Eliquis    GERD  --Continue Pepcid, Protonix    Seizure disorder  --Phenobarbital level WNL  --Follows with Dr. Garcia, on Vimpat and Phenobarbital, will continue  --Consider EEG if mental status does not improve with above treatment but low suspicion for seizure at this time    BPH  --Continue Proscar    RLS  --Continue Mirapex    DVT prophylaxis:  Eliquis    ADDENDUM 22:35 EST UDS returned positive for fentanyl, did not receive any in the ER so unsure where this is coming from -- will need to discuss with family in AM.       Total time spent: 75 minutes  Time spent includes time reviewing chart, face-to-face time, counseling patient/family/caregiver, ordering medications/tests/procedures, communicating with other health care professionals, documenting clinical information in the electronic health record, and coordination of care.     CODE STATUS:    Code Status and Medical Interventions:   Ordered at: 11/07/23 1553     Medical Intervention Limits:    NO intubation (DNI)     Code Status (Patient has no pulse and is not breathing):    No CPR (Do Not Attempt to Resuscitate)     Medical Interventions (Patient has pulse or is breathing):    Limited Support       Expected Discharge    Expected discharge date/ time has not been documented.     Jonna Stephens MD  11/07/23

## 2023-11-07 NOTE — CASE MANAGEMENT/SOCIAL WORK
Discharge Planning Assessment  [facility]     Patient Name: Caesar Kim  MRN: [id number]  Today's Date: 11/7/2023    Admit Date: 11/7/2023    Plan: Home   Discharge Needs Assessment       Row Name 11/07/23 1419       Living Environment    People in Home spouse    Current Living Arrangements home    Primary Care Provided by self    Provides Primary Care For no one, unable/limited ability to care for self    Family Caregiver if Needed spouse    Family Caregiver Names Annelise Kim, wife    Quality of Family Relationships helpful;involved;supportive    Able to Return to Prior Arrangements yes       Resource/Environmental Concerns    Resource/Environmental Concerns none       Transition Planning    Patient/Family Anticipates Transition to home with family;home with help/services;inpatient rehabilitation facility    Patient/Family Anticipated Services at Transition        Discharge Needs Assessment    Equipment Currently Used at Home cane, straight;walker, rolling;wheelchair;cpap    Concerns to be Addressed denies needs/concerns at this time    Discharge Coordination/Progress Lives in a house in [place] with his wife Annelise, needs assistance with ADLs. Has a cane, walker, wheelchair and cpap. He has oxygen but they are taking the oxygen away today. Has had [email or web address] in the past.  Has an advanced directive.                   Discharge Plan       Row Name 11/07/23 1420       Plan    Plan Home    Patient/Family in Agreement with Plan yes    Plan Comments I spoke with Mr Kim and his wife Annelise at bedside.  Mr Kim resides in a house in [place] with Annelise and requires assistance with ADLs.  There are two steps to enter the house from the front, and one step to enter the house from the back. Everything is on one level once inside the house. He has a wheelchair, cane, walker and cpap at home.  Annelise states that Mr Kim does have oxygen at home however they are taking the oxygen away today  as he no longer needs it.  He has had [facility] home health in the past.  He does have an advanced directive.  His insurance is Medicare with [facility] as secondary, and he denies any issues or lapses in coverage. Annelise believes that his new PCP is Daria Rodríguez, and he gets his prescriptions filled at [facility] off of [facility]. At this time, there are no discharge needs.    Final Discharge Disposition Code 01 - home or self-care                  Continued Care and Services - Admitted Since 11/7/2023    Coordination has not been started for this encounter.          Demographic Summary    No documentation.                  Functional Status       Row Name 11/07/23 1419       Functional Status    Usual Activity Tolerance fair    Current Activity Tolerance fair       Functional Status, IADL    Medications assistive equipment and person    Meal Preparation assistive equipment and person    Housekeeping assistive equipment and person    Laundry assistive equipment and person    Shopping assistive equipment and person       Mental Status    General Appearance WDL WDL                   Psychosocial    No documentation.                  Abuse/Neglect    No documentation.                  Legal    No documentation.                  Substance Abuse    No documentation.                  Patient Forms    No documentation.                     Jenny Feliz, RN

## 2023-11-07 NOTE — ED PROVIDER NOTES
"Subjective   History of Present Illness  This patient is a very nice 83-year-old gentleman who is brought in from home via EMS.  Evidently the patient had a fall this morning.  He was complaining of some right hip pain.  He was found on the floor by his wife.  Wife is not here currently to provide history but I am hopeful that she will show up shortly.  EMS indicated that the patient did not let his wife call EMS into the home health nurse came approximate 1 hour prior to arrival here.  Patient has been here approximately 30 minutes as of the time of this dictation.  I saw the patient within 5 minutes of arrival when he was initially placed in a narayanan bed.  Patient does take Eliquis.  He is unsure of exactly why he takes Eliquis.  Patient is a GCS of 14 here.  He is oriented to place and name but not to date.  According to nursing and EMS notes, this is baseline for the patient.  Patient does have a history of seizures and takes phenobarbital he also has a history of hypertension.  Patient tells me his right hip is hurting.  He was unsure of exactly when he fell but told me that he thought \"it was around 330 this morning.\"  Wife is not here to confirm.  Patient denies any chest pain or abdominal pain.  Denies any neck or back pain.  Denies any headache or vision changes.  Denies any weakness in the lower or upper extremities but tells me that it hurts when he moves his right leg around.  In summary, we have a very nice 83-year-old gentleman who reportedly had a fall today and was found down by his wife new comes in with right hip pain.    Past medical history  PE, atrial fibrillation, hypertension, hypercholesterolemia, GERD, sleep apnea, AAA, BPH, seizures, CHF, COPD and restless leg syndrome.  Patient on Eliquis.    Family history  Patient denies CVA in first-degree family member      Review of Systems   Constitutional: Negative.  Negative for chills, fatigue, fever and unexpected weight change.   HENT:  Negative " for dental problem, ear pain, hearing loss and sinus pressure.    Eyes:  Negative for pain and visual disturbance.   Respiratory:  Negative for chest tightness and shortness of breath.    Cardiovascular:  Negative for chest pain, palpitations and leg swelling.   Gastrointestinal:  Negative for blood in stool, diarrhea, nausea and vomiting.   Genitourinary:  Negative for difficulty urinating, dysuria, frequency, hematuria and urgency.   Musculoskeletal:  Positive for arthralgias. Negative for myalgias, neck pain and neck stiffness.        Right hip pain   Neurological:  Negative for seizures, syncope, speech difficulty, light-headedness and headaches.   Psychiatric/Behavioral:  Negative for confusion.    All other systems reviewed and are negative.      Past Medical History:   Diagnosis Date    AAA (abdominal aortic aneurysm)     Arthritis     Atrial fibrillation     BPH (benign prostatic hyperplasia)     CHF (congestive heart failure)     COPD (chronic obstructive pulmonary disease)     Diabetes mellitus     Elevated cholesterol     GERD (gastroesophageal reflux disease)     Hypertension     Memory loss     Personal history of pulmonary embolism     Pulmonary embolism     Restless leg syndrome     Sleep apnea     Uses CPAP    Unspecified convulsions     Unspecified diastolic (congestive) heart failure     Urinary tract infection        Allergies   Allergen Reactions    Acyclovir Other (See Comments) and GI Intolerance     Other reaction(s): Other, Other: See Comments      Atorvastatin Myalgia     Other reaction(s): Myalgia, Myalgias (Muscle Pain)      Fenofibrate Myalgia     Other reaction(s): Myalgias (Muscle Pain)  Other reaction(s): Myalgia      Metformin Unknown - High Severity     Other reaction(s): Unknown      Mirabegron Unknown - High Severity     Other reaction(s): Unknown         Past Surgical History:   Procedure Laterality Date    CHOLECYSTECTOMY      COLONOSCOPY      CYSTOSCOPY TRANSURETHRAL RESECTION  OF PROSTATE N/A 2022    Procedure: CYSTOSCOPY TRANSURETHRAL RESECTION OF PROSTATE GREENLIGHT;  Surgeon: Darius Costa MD;  Location: [facility];  Service: Urology;  Laterality: N/A;    ENDOSCOPY      EYE SURGERY Bilateral     CATARACTS    SHOULDER SURGERY Left     SKIN BIOPSY      NECK    TONSILLECTOMY      AND ADENOIDS       Family History   Problem Relation Age of Onset    Heart attack Other     Tuberculosis Other        Social History     Socioeconomic History    Marital status:    Tobacco Use    Smoking status: Former     Types: Cigarettes     Quit date:      Years since quittin.8    Smokeless tobacco: Former     Quit date:    Substance and Sexual Activity    Alcohol use: Never    Drug use: Not Currently    Sexual activity: Defer           Objective   Physical Exam  Vitals and nursing note reviewed. Exam conducted with a chaperone present (Charge nurse / Maria Victoria).   Constitutional:       General: He is not in acute distress.     Appearance: Normal appearance. He is normal weight. He is not toxic-appearing.   HENT:      Head: Normocephalic and atraumatic.      Right Ear: External ear normal.      Left Ear: External ear normal.      Nose: Nose normal.      Mouth/Throat:      Mouth: Mucous membranes are moist.      Pharynx: Oropharynx is clear.   Eyes:      General:         Right eye: No discharge.         Left eye: No discharge.      Extraocular Movements: Extraocular movements intact.      Conjunctiva/sclera: Conjunctivae normal.      Pupils: Pupils are equal, round, and reactive to light.   Cardiovascular:      Rate and Rhythm: Normal rate and regular rhythm.      Pulses: Normal pulses.      Heart sounds: Normal heart sounds. No murmur heard.     No gallop.   Pulmonary:      Effort: Pulmonary effort is normal. No respiratory distress.      Breath sounds: Normal breath sounds. No wheezing.   Abdominal:      General: Abdomen is flat. Bowel sounds are normal.      Palpations: Abdomen is  soft.   Musculoskeletal:         General: Tenderness present.      Cervical back: Normal range of motion.      Comments: Patient with normal range of motion and symmetric strength in the bilateral upper extremities with flexion and extension of fingers wrist elbows.  Left lower extremity shows normal range of motion at the hip, knee and ankle joints.  Right lower extremity with markedly decreased range of motion secondary to pain in the right lateral hip.  Patient is tender in the right buttocks and right lateral hip.  No shortening or deformity obvious.  No contusion or abrasion.   Skin:     Capillary Refill: Capillary refill takes less than 2 seconds.      Coloration: Skin is not jaundiced.      Findings: No bruising or lesion.   Neurological:      General: No focal deficit present.      Mental Status: He is alert. Mental status is at baseline.   Psychiatric:         Mood and Affect: Mood normal.         Behavior: Behavior normal.         Thought Content: Thought content normal.         Procedures           ED Course  ED Course as of 11/07/23 1517   Tue Nov 07, 2023   1344 I talked to the patient about the differential diagnosis and medical decision making.  He is unsure of exactly how long he was on the ground.  I have ordered a CK, CMP phenobarbital level and CBC.  I ordered x-rays of the right hip as well as a CT of the head.  Anticipate the patient is likely at baseline with regard to mental status.  I anticipate he likely has some baseline dementia.  We will plan to get a hold of family or power of  during the hospital course. [MARCELA]   1345 Do not believe the patient likely has trauma related intracranial findings.  Based on clinical exam, right hip fracture is of high likelihood.  Patient does not require pain medication at this time.  Final impression and plan following completion of work-up. [MARCELA]   1428 CT is pending.  Nursing staff is attempting to place IV and get blood drawn currently. [MARCELA]   1432  Right hip x-ray has been completed.  I have reviewed independently.  No evidence of acute abnormality amount independent evaluation.  Will add bony pelvis/CT secondary to exam findings.  Official x-ray read has been cut/pasted below.    Findings:  The right hip joint is congruent without evidence of acute fracture or traumatic malalignment. Mild degenerative change of the right hip joint. No acute osseous findings in the pelvis. Grossly unremarkable appearance of the partially valuated left hip.   Aortobiiliac stent graft is noted. A couple surgical clips are seen in the lower central pelvis.     IMPRESSION:  Impression:  No acute fracture or malalignment.        Electronically Signed: Ramu Ocampo MD    11/7/2023 2:25 PM EST    Workstation ID: [id number]   [MARCELA]   1459 CBC is back and is unremarkable. [MARCELA]   1459 CT of the bony pelvis and CT of the head completed and I have reviewed images independently.  Independent interpretation did not see or show any abnormality.  Official radiologic read confirms.  No bony/osseous abnormality in the CT of the pelvis no acute intracranial abnormality on the CT of the head, which is encouraging. [MARCELA]   1500 We will wait for the rest of the labs to come back.  If unremarkable and patient is able to ambulate.  We will plan for ultimate discharge home to follow-up with his PCP. [MARCELA]   1512 Labs are starting to come back.  With the exception of the urinalysis, labs are relatively benign.  Urinalysis is pending.  Total CK is elevated at 530 as expected.  Phenobarbital level normal.  CBC unremarkable.  CMP essentially unremarkable.  Urinalysis pending.  CT scan of the head and CT bony pelvis unremarkable as previously referenced. [MARCELA]   1513 I started IV fluids on the patient and we will bring the patient into the hospitalist Dr. Stephens. [MARCELA]   1513   I went back to the bedside and had a great conversation with the patient's wife.  I got a much better story regarding the patient's  recent history.  Although the patient does have some baseline confusion, the patient's wife is very clear about the fact that something has been rapidly progressing over the last 4 to 5 days.  She also confirmed that the patient has been on the ground since at least Monday morning, yesterday or approximate 24 hours.  She tells me the patient has no history of dementia.  The patient himself, has no specific complaints.  He is oriented to name and place but disoriented to date and birthday.  Wife indicates he usually would know his birthday.  Wife, myself and nursing staff agrees the patient does not appear to be thinking clearly.  Patient, at minimum has rhabdomyolysis and will need to come in for IV fluids and repeat labs.  We will check a urine and additional studies per hospitalist determination.  Family is comfortable with the plan for admission and patient will be admitted to Dr. Stephens accordingly. [MARCELA]   1516 Dr. Stephens contacted at 315pm ET for admission. [MARCELA]      ED Course User Index  [MARCELA] Hemal Brooks MD     Recent Results (from the past 24 hour(s))   Comprehensive Metabolic Panel    Collection Time: 11/07/23  2:27 PM    Specimen: Blood   Result Value Ref Range    Glucose 124 (H) 65 - 99 mg/dL    BUN 13 8 - 23 mg/dL    Creatinine 0.84 0.76 - 1.27 mg/dL    Sodium 141 136 - 145 mmol/L    Potassium 4.2 3.5 - 5.2 mmol/L    Chloride 104 98 - 107 mmol/L    CO2 28.0 22.0 - 29.0 mmol/L    Calcium 9.5 8.6 - 10.5 mg/dL    Total Protein 7.5 6.0 - 8.5 g/dL    Albumin 3.9 3.5 - 5.2 g/dL    ALT (SGPT) 18 1 - 41 U/L    AST (SGOT) 34 1 - 40 U/L    Alkaline Phosphatase 62 39 - 117 U/L    Total Bilirubin 0.6 0.0 - 1.2 mg/dL    Globulin 3.6 gm/dL    A/G Ratio 1.1 g/dL    BUN/Creatinine Ratio 15.5 7.0 - 25.0    Anion Gap 9.0 5.0 - 15.0 mmol/L    eGFR 86.5 >60.0 mL/min/1.73   CK    Collection Time: 11/07/23  2:27 PM    Specimen: Blood   Result Value Ref Range    Creatine Kinase 530 (H) 20 - 200 U/L   Phenobarbital Level     Collection Time: 11/07/23  2:27 PM    Specimen: Blood   Result Value Ref Range    Phenobarbital 11.5 10.0 - 30.0 mcg/mL   CBC Auto Differential    Collection Time: 11/07/23  2:27 PM    Specimen: Blood   Result Value Ref Range    WBC 6.66 3.40 - 10.80 10*3/mm3    RBC 5.14 4.14 - 5.80 10*6/mm3    Hemoglobin 14.7 13.0 - 17.7 g/dL    Hematocrit 46.7 37.5 - 51.0 %    MCV 90.9 79.0 - 97.0 fL    MCH 28.6 26.6 - 33.0 pg    MCHC 31.5 31.5 - 35.7 g/dL    RDW 19.1 (H) 12.3 - 15.4 %    RDW-SD 62.6 (H) 37.0 - 54.0 fl    MPV 10.7 6.0 - 12.0 fL    Platelets 283 140 - 450 10*3/mm3    Neutrophil % 63.1 42.7 - 76.0 %    Lymphocyte % 19.5 (L) 19.6 - 45.3 %    Monocyte % 12.5 (H) 5.0 - 12.0 %    Eosinophil % 2.7 0.3 - 6.2 %    Basophil % 1.1 0.0 - 1.5 %    Immature Grans % 1.1 (H) 0.0 - 0.5 %    Neutrophils, Absolute 4.21 1.70 - 7.00 10*3/mm3    Lymphocytes, Absolute 1.30 0.70 - 3.10 10*3/mm3    Monocytes, Absolute 0.83 0.10 - 0.90 10*3/mm3    Eosinophils, Absolute 0.18 0.00 - 0.40 10*3/mm3    Basophils, Absolute 0.07 0.00 - 0.20 10*3/mm3    Immature Grans, Absolute 0.07 (H) 0.00 - 0.05 10*3/mm3    nRBC 0.0 0.0 - 0.2 /100 WBC     Note: In addition to lab results from this visit, the labs listed above may include labs taken at another facility or during a different encounter within the last 24 hours. Please correlate lab times with ED admission and discharge times for further clarification of the services performed during this visit.    CT Head Without Contrast   Final Result   Impression:   No acute intracranial process identified.            Electronically Signed: Uriel Groves MD     11/7/2023 1:44 PM CST     Workstation ID: [id number]      CT Pelvis Without Contrast   Final Result   Impression:   1.No acute osseous injury is identified.            Electronically Signed: Uriel Groves MD     11/7/2023 1:48 PM CST     Workstation ID: [id number]      XR Hip With or Without Pelvis 2 - 3 View Right   Final Result   Impression:   No acute  fracture or malalignment.         Electronically Signed: Ramu Ocampo MD     11/7/2023 2:25 PM EST     Workstation ID: [id number]        There were no vitals filed for this visit.  Medications   sodium chloride 0.9 % infusion (has no administration in time range)     ECG/EMG Results (last 24 hours)       ** No results found for the last 24 hours. **          No orders to display                                               Medical Decision Making  Certainly must consider dislocation, fracture, contusion.  Must consider rhabdomyolysis given the fact that we are not sure how long the patient was down.  We will order a CK, basic labs, CT of the head given the Eliquis and confusion as well as x-ray.  Anticipate the patient will need to come into the hospital if he is unable to bear weight.  Hoping to get the patient out of the hallway and into a room soon.    Problems Addressed:  Arthralgia of right hip: complicated acute illness or injury  Confusion: complicated acute illness or injury  Contusion of right hip, initial encounter: complicated acute illness or injury  Mild dehydration: complicated acute illness or injury  Traumatic rhabdomyolysis, initial encounter: complicated acute illness or injury    Amount and/or Complexity of Data Reviewed  Labs: ordered. Decision-making details documented in ED Course.  Radiology: ordered. Decision-making details documented in ED Course.    Risk  Prescription drug management.  Decision regarding hospitalization.        Final diagnoses:   Arthralgia of right hip   Contusion of right hip, initial encounter   Traumatic rhabdomyolysis, initial encounter   Confusion   Mild dehydration       ED Disposition  ED Disposition       ED Disposition   Decision to Admit    Condition   --    Comment   --               No follow-up provider specified.       Medication List      No changes were made to your prescriptions during this visit.            Hemal Brooks MD  11/07/23 9248

## 2023-11-08 LAB
ANION GAP SERPL CALCULATED.3IONS-SCNC: 11 MMOL/L (ref 5–15)
BACTERIA SPEC AEROBE CULT: NORMAL
BUN SERPL-MCNC: 14 MG/DL (ref 8–23)
BUN/CREAT SERPL: 19.4 (ref 7–25)
CALCIUM SPEC-SCNC: 8.9 MG/DL (ref 8.6–10.5)
CHLORIDE SERPL-SCNC: 107 MMOL/L (ref 98–107)
CK SERPL-CCNC: 294 U/L (ref 20–200)
CO2 SERPL-SCNC: 23 MMOL/L (ref 22–29)
CREAT SERPL-MCNC: 0.72 MG/DL (ref 0.76–1.27)
DEPRECATED RDW RBC AUTO: 63.3 FL (ref 37–54)
EGFRCR SERPLBLD CKD-EPI 2021: 90.7 ML/MIN/1.73
ERYTHROCYTE [DISTWIDTH] IN BLOOD BY AUTOMATED COUNT: 19.2 % (ref 12.3–15.4)
GLUCOSE SERPL-MCNC: 82 MG/DL (ref 65–99)
HCT VFR BLD AUTO: 39.4 % (ref 37.5–51)
HGB BLD-MCNC: 12.5 G/DL (ref 13–17.7)
MCH RBC QN AUTO: 28.6 PG (ref 26.6–33)
MCHC RBC AUTO-ENTMCNC: 31.7 G/DL (ref 31.5–35.7)
MCV RBC AUTO: 90.2 FL (ref 79–97)
PLATELET # BLD AUTO: 262 10*3/MM3 (ref 140–450)
PMV BLD AUTO: 10.8 FL (ref 6–12)
POTASSIUM SERPL-SCNC: 4 MMOL/L (ref 3.5–5.2)
RBC # BLD AUTO: 4.37 10*6/MM3 (ref 4.14–5.8)
SODIUM SERPL-SCNC: 141 MMOL/L (ref 136–145)
WBC NRBC COR # BLD: 5.24 10*3/MM3 (ref 3.4–10.8)

## 2023-11-08 PROCEDURE — 25810000003 SODIUM CHLORIDE 0.9 % SOLUTION: Performed by: HOSPITALIST

## 2023-11-08 PROCEDURE — 80048 BASIC METABOLIC PNL TOTAL CA: CPT | Performed by: HOSPITALIST

## 2023-11-08 PROCEDURE — 99233 SBSQ HOSP IP/OBS HIGH 50: CPT | Performed by: INTERNAL MEDICINE

## 2023-11-08 PROCEDURE — 82550 ASSAY OF CK (CPK): CPT | Performed by: HOSPITALIST

## 2023-11-08 PROCEDURE — 97162 PT EVAL MOD COMPLEX 30 MIN: CPT

## 2023-11-08 PROCEDURE — G0378 HOSPITAL OBSERVATION PER HR: HCPCS

## 2023-11-08 PROCEDURE — 97165 OT EVAL LOW COMPLEX 30 MIN: CPT

## 2023-11-08 PROCEDURE — 85027 COMPLETE CBC AUTOMATED: CPT | Performed by: HOSPITALIST

## 2023-11-08 RX ADMIN — Medication 10 ML: at 21:30

## 2023-11-08 RX ADMIN — PRAMIPEXOLE DIHYDROCHLORIDE 1.5 MG: 1 TABLET ORAL at 09:16

## 2023-11-08 RX ADMIN — FINASTERIDE 5 MG: 5 TABLET, FILM COATED ORAL at 09:16

## 2023-11-08 RX ADMIN — APIXABAN 5 MG: 5 TABLET, FILM COATED ORAL at 09:16

## 2023-11-08 RX ADMIN — SODIUM CHLORIDE 125 ML/HR: 9 INJECTION, SOLUTION INTRAVENOUS at 03:47

## 2023-11-08 RX ADMIN — APIXABAN 5 MG: 5 TABLET, FILM COATED ORAL at 21:30

## 2023-11-08 RX ADMIN — LACOSAMIDE 100 MG: 100 TABLET, FILM COATED ORAL at 09:15

## 2023-11-08 RX ADMIN — SENNOSIDES AND DOCUSATE SODIUM 2 TABLET: 8.6; 5 TABLET ORAL at 09:15

## 2023-11-08 RX ADMIN — PRAMIPEXOLE DIHYDROCHLORIDE 1.5 MG: 1 TABLET ORAL at 21:30

## 2023-11-08 RX ADMIN — PHENOBARBITAL 32.4 MG: 32.4 TABLET ORAL at 21:30

## 2023-11-08 RX ADMIN — FAMOTIDINE 40 MG: 20 TABLET, FILM COATED ORAL at 09:15

## 2023-11-08 RX ADMIN — Medication 10 ML: at 09:18

## 2023-11-08 RX ADMIN — PANTOPRAZOLE SODIUM 40 MG: 40 TABLET, DELAYED RELEASE ORAL at 05:18

## 2023-11-08 RX ADMIN — LOSARTAN POTASSIUM 25 MG: 25 TABLET, FILM COATED ORAL at 09:16

## 2023-11-08 NOTE — PLAN OF CARE
Goal Outcome Evaluation:  Plan of Care Reviewed With: patient        Progress: no change  Outcome Evaluation: Pt. presents below baseline with ADLs and functional mobility. Limited by decreased activity tolerance, generalized weakness, balance, and pain. Skilled OT services warranted to promote return to PLOF. Recommend SNF at discharge.      Anticipated Discharge Disposition (OT): skilled nursing facility

## 2023-11-08 NOTE — PLAN OF CARE
Goal Outcome Evaluation:  Plan of Care Reviewed With: patient        Progress: improving  Outcome Evaluation: alert & pleasantly confused; VSS, RA - 2L n.c. PRN while resting; no complaints overnight; incontinent of urine - skin care completed; no acute changes to report from overnight; will continue POC         Problem: Adult Inpatient Plan of Care  Goal: Absence of Hospital-Acquired Illness or Injury  Intervention: Identify and Manage Fall Risk  Recent Flowsheet Documentation  Taken 11/8/2023 0359 by Jayson Ibrahim, RN  Safety Promotion/Fall Prevention:   assistive device/personal items within reach   clutter free environment maintained   activity supervised   fall prevention program maintained   nonskid shoes/slippers when out of bed   room organization consistent   safety round/check completed  Taken 11/8/2023 0200 by Jayson Ibrahim, RN  Safety Promotion/Fall Prevention:   assistive device/personal items within reach   clutter free environment maintained   activity supervised   fall prevention program maintained   nonskid shoes/slippers when out of bed   room organization consistent   safety round/check completed  Taken 11/8/2023 0000 by Jayson Ibrahim, RN  Safety Promotion/Fall Prevention:   assistive device/personal items within reach   activity supervised   clutter free environment maintained   fall prevention program maintained   nonskid shoes/slippers when out of bed   room organization consistent   safety round/check completed  Taken 11/7/2023 2200 by Jayson Ibrahim, RN  Safety Promotion/Fall Prevention:   assistive device/personal items within reach   clutter free environment maintained   activity supervised   fall prevention program maintained   nonskid shoes/slippers when out of bed   room organization consistent   safety round/check completed  Taken 11/7/2023 1950 by Jayson Ibrahim, RN  Safety Promotion/Fall Prevention:   activity supervised   assistive device/personal items within  reach   clutter free environment maintained   fall prevention program maintained   nonskid shoes/slippers when out of bed   room organization consistent   safety round/check completed     Problem: Adult Inpatient Plan of Care  Goal: Absence of Hospital-Acquired Illness or Injury  Intervention: Prevent Skin Injury  Recent Flowsheet Documentation  Taken 11/8/2023 0359 by Jayson Ibrahim RN  Body Position: position changed independently  Skin Protection:   adhesive use limited   incontinence pads utilized   tubing/devices free from skin contact   transparent dressing maintained  Taken 11/8/2023 0200 by Jayson Ibrahim RN  Body Position: position changed independently  Skin Protection:   adhesive use limited   incontinence pads utilized   transparent dressing maintained   tubing/devices free from skin contact  Taken 11/8/2023 0000 by Jayson Ibrahim RN  Body Position: position changed independently  Skin Protection:   adhesive use limited   incontinence pads utilized   transparent dressing maintained   tubing/devices free from skin contact  Taken 11/7/2023 2200 by Jayson Ibrahim RN  Body Position: position changed independently  Skin Protection:   adhesive use limited   incontinence pads utilized   transparent dressing maintained   tubing/devices free from skin contact  Taken 11/7/2023 1950 by Jayson Ibrahim RN  Body Position: position changed independently  Skin Protection:   adhesive use limited   incontinence pads utilized   tubing/devices free from skin contact   transparent dressing maintained

## 2023-11-08 NOTE — THERAPY EVALUATION
Patient Name: Caesar Kim  : 1940    MRN: [id number]                              Today's Date: 2023       Admit Date: 2023    Visit Dx:     ICD-10-CM ICD-9-CM   1. Arthralgia of right hip  M25.551 719.45   2. Contusion of right hip, initial encounter  S70.01XA 924.01   3. Traumatic rhabdomyolysis, initial encounter  T79.6XXA 958.6   4. Confusion  R41.0 298.9   5. Mild dehydration  E86.0 276.51     Patient Active Problem List   Diagnosis    Urinary tract infection    Muscle weakness    Encephalopathy, unspecified    Rhabdomyolysis    Fall    Right hip pain    Altered mental status    Essential hypertension    Atrial fibrillation     Past Medical History:   Diagnosis Date    AAA (abdominal aortic aneurysm)     Arthritis     Atrial fibrillation     BPH (benign prostatic hyperplasia)     CHF (congestive heart failure)     COPD (chronic obstructive pulmonary disease)     Diabetes mellitus     Elevated cholesterol     GERD (gastroesophageal reflux disease)     Hypertension     Memory loss     Personal history of pulmonary embolism     Pulmonary embolism     Restless leg syndrome     Sleep apnea     Uses CPAP    Unspecified convulsions     Unspecified diastolic (congestive) heart failure     Urinary tract infection      Past Surgical History:   Procedure Laterality Date    CHOLECYSTECTOMY      COLONOSCOPY      CYSTOSCOPY TRANSURETHRAL RESECTION OF PROSTATE N/A 2022    Procedure: CYSTOSCOPY TRANSURETHRAL RESECTION OF PROSTATE GREENLIGHT;  Surgeon: Darius Costa MD;  Location: [facility];  Service: Urology;  Laterality: N/A;    ENDOSCOPY      EYE SURGERY Bilateral     CATARACTS    SHOULDER SURGERY Left     SKIN BIOPSY      NECK    TONSILLECTOMY      AND ADENOIDS      General Information       Row Name 23 0932          Physical Therapy Time and Intention    Document Type evaluation  -     Mode of Treatment physical therapy  -       Row Name 23 0932          General Information     Patient Profile Reviewed yes  -     Prior Level of Function --  pt is a questionable historian at this time, per chart review pt has cane walker and w/c but unsure which he utilizes the most frequently. hx of falls will follow up  -     Existing Precautions/Restrictions fall;other (see comments);oxygen therapy device and L/min  AMS  -     Barriers to Rehab previous functional deficit;medically complex;cognitive status  -       Row Name 11/08/23 0932          Living Environment    People in Home spouse  -       Row Name 11/08/23 0932          Home Main Entrance    Number of Stairs, Main Entrance other (see comments)  per chart review, pt has one step at one entrance and two steps at another  -       Row Name 11/08/23 0932          Stairs Within Home, Primary    Number of Stairs, Within Home, Primary none  -       Row Name 11/08/23 0932          Cognition    Orientation Status (Cognition) oriented to;person;verbal cues/prompts needed for orientation;place;disoriented to;time;situation  pt stated [facility] as hospital, and stated October of 2011 as time  -       Row Name 11/08/23 0932          Safety Issues, Functional Mobility    Safety Issues Affecting Function (Mobility) awareness of need for assistance;insight into deficits/self-awareness;judgment;problem-solving;safety precaution awareness;safety precautions follow-through/compliance;sequencing abilities  -     Impairments Affecting Function (Mobility) balance;cognition;endurance/activity tolerance;pain;shortness of breath;strength  -     Cognitive Impairments, Mobility Safety/Performance awareness, need for assistance;insight into deficits/self-awareness;judgment;problem-solving/reasoning;safety precaution awareness;safety precaution follow-through;sequencing abilities  -               User Key  (r) = Recorded By, (t) = Taken By, (c) = Cosigned By      Initials Name Provider Type    Massiel Mosley, PT Physical Therapist                    Mobility       Row Name 11/08/23 1043          Bed Mobility    Bed Mobility scooting/bridging;supine-sit  -HM     Scooting/Bridging [place] (Bed Mobility) 2 person assist;maximum assist (25% patient effort);verbal cues  -HM     Supine-Sit [place] (Bed Mobility) maximum assist (25% patient effort);2 person assist;verbal cues  -HM     Assistive Device (Bed Mobility) bed rails;head of bed elevated;draw sheet  -     Comment, (Bed Mobility) cues for sequencing, limited by pain. assist at BLE and trunk  -       Row Name 11/08/23 1043          Bed-Chair Transfer    Bed-Chair [place] (Transfers) moderate assist (50% patient effort);2 person assist;verbal cues  -     Assistive Device (Bed-Chair Transfers) walker, front-wheeled  -HM     Comment, (Bed-Chair Transfer) cues for hand placement and sequencing, pt able to take short lateral steps to chair  -       Row Name 11/08/23 1043          Sit-Stand Transfer    Sit-Stand [place] (Transfers) moderate assist (50% patient effort);2 person assist;verbal cues  -     Assistive Device (Sit-Stand Transfers) walker, front-wheeled  -HM     Comment, (Sit-Stand Transfer) cues for hand placement and upright posture  -               User Key  (r) = Recorded By, (t) = Taken By, (c) = Cosigned By      Initials Name Provider Type    Massiel Mosley PT Physical Therapist                   Obj/Interventions       Row Name 11/08/23 1044          Range of Motion Comprehensive    General Range of Motion bilateral lower extremity ROM WFL  -       Row Name 11/08/23 1044          Strength Comprehensive (MMT)    General Manual Muscle Testing (MMT) Assessment lower extremity strength deficits identified  -     Comment, General Manual Muscle Testing (MMT) Assessment formal MMT deferred d/t pain, BLE grossly 3+/5  -HM       Row Name 11/08/23 1044          Balance    Balance Assessment sitting static balance;sitting dynamic balance;sit to stand dynamic  balance;standing static balance;standing dynamic balance  -HM     Static Sitting Balance contact guard  -HM     Dynamic Sitting Balance contact guard  -HM     Position, Sitting Balance unsupported;sitting edge of bed  -     Sit to Stand Dynamic Balance moderate assist;2-person assist  -HM     Static Standing Balance moderate assist;2-person assist  -HM     Dynamic Standing Balance moderate assist;2-person assist  -HM     Position/Device Used, Standing Balance supported;walker, front-wheeled  -     Balance Interventions standing;dynamic;occupation based/functional task  -     Comment, Balance no LOB  -       Row Name 11/08/23 1044          Sensory Assessment (Somatosensory)    Sensory Assessment (Somatosensory) LE sensation intact  -               User Key  (r) = Recorded By, (t) = Taken By, (c) = Cosigned By      Initials Name Provider Type     Massiel Mckeon, PT Physical Therapist                   Goals/Plan       Row Name 11/08/23 1050          Bed Mobility Goal 1 (PT)    Activity/Assistive Device (Bed Mobility Goal 1, PT) bed mobility activities, all  -HM     [place] Level/Cues Needed (Bed Mobility Goal 1, PT) contact guard required  -HM     Time Frame (Bed Mobility Goal 1, PT) 10 days;long term goal (LTG)  -HM     Progress/Outcomes (Bed Mobility Goal 1, PT) new goal  -       Row Name 11/08/23 1050          Transfer Goal 1 (PT)    Activity/Assistive Device (Transfer Goal 1, PT) sit-to-stand/stand-to-sit;bed-to-chair/chair-to-bed  -HM     [place] Level/Cues Needed (Transfer Goal 1, PT) contact guard required  -     Time Frame (Transfer Goal 1, PT) long term goal (LTG);10 days  -HM     Progress/Outcome (Transfer Goal 1, PT) new goal  -       Row Name 11/08/23 1050          Gait Training Goal 1 (PT)    Activity/Assistive Device (Gait Training Goal 1, PT) gait (walking locomotion);assistive device use  -     [place] Level (Gait Training Goal 1, PT) contact guard required  -HM      Distance (Gait Training Goal 1, PT) 100  -HM     Time Frame (Gait Training Goal 1, PT) long term goal (LTG);10 days  -     Progress/Outcome (Gait Training Goal 1, PT) new goal  -       Row Name 11/08/23 1050          Therapy Assessment/Plan (PT)    Planned Therapy Interventions (PT) balance training;bed mobility training;gait training;home exercise program;neuromuscular re-education;patient/family education;postural re-education;stair training;strengthening;transfer training  -               User Key  (r) = Recorded By, (t) = Taken By, (c) = Cosigned By      Initials Name Provider Type     Massiel Mckeon, PT Physical Therapist                   Clinical Impression       Row Name 11/08/23 1045          Pain    Pain Intervention(s) Ambulation/increased activity;Repositioned  -     Additional Documentation Pain Scale: FACES Pre/Post-Treatment (Group)  -       Row Name 11/08/23 1045          Pain Scale: FACES Pre/Post-Treatment    Pain: FACES Scale, Pretreatment 2-->hurts little bit  -     Posttreatment Pain Rating 2-->hurts little bit  -     Pain Location - Side/Orientation Bilateral  -     Pain Location lower  -     Pain Location - extremity  -     Pre/Posttreatment Pain Comment tolerated, RN aware  -       Row Name 11/08/23 1045          Plan of Care Review    Plan of Care Reviewed With patient  -     Progress no change  -     Outcome Evaluation PT eval completed. Pt completed a bed to chair transfer Mod A x2 with FWx with further mobility limited d/t pain. Pt demonstrated mobility below baseline function with pain limiting function, strength deficits, and balance deficits warranting IPPT POC. d/c rec SNF.  -       Row Name 11/08/23 1044          Therapy Assessment/Plan (PT)    Rehab Potential (PT) good, to achieve stated therapy goals  -     Criteria for Skilled Interventions Met (PT) yes;meets criteria;skilled treatment is necessary  -     Therapy Frequency (PT) daily  -        Row Name 11/08/23 1045          Vital Signs    Pre Systolic BP Rehab 103  -HM     Pre Treatment Diastolic BP 65  -HM     Post Systolic BP Rehab 102  -HM     Post Treatment Diastolic BP 86  -HM     Pretreatment Heart Rate (beats/min) 74  -HM     Posttreatment Heart Rate (beats/min) 75  -HM     O2 Delivery Pre Treatment nasal cannula  -HM     O2 Delivery Intra Treatment nasal cannula  -HM     Post SpO2 (%) 91  -HM     O2 Delivery Post Treatment nasal cannula  -HM     Pre Patient Position Supine  -HM     Intra Patient Position Standing  -HM     Post Patient Position Sitting  -HM       Row Name 11/08/23 1045          Positioning and Restraints    Pre-Treatment Position in bed  -HM     Post Treatment Position chair  -HM     In Chair notified nsg;reclined;sitting;call light within reach;encouraged to call for assist;exit alarm on;waffle cushion  -               User Key  (r) = Recorded By, (t) = Taken By, (c) = Cosigned By      Initials Name Provider Type     Massiel Mckeon PT Physical Therapist                   Outcome Measures       Row Name 11/08/23 1051          How much help from another person do you currently need...    Turning from your back to your side while in flat bed without using bedrails? 2  -HM     Moving from lying on back to sitting on the side of a flat bed without bedrails? 2  -HM     Moving to and from a bed to a chair (including a wheelchair)? 2  -HM     Standing up from a chair using your arms (e.g., wheelchair, bedside chair)? 2  -HM     Climbing 3-5 steps with a railing? 1  -HM     To walk in hospital room? 2  -HM     AM-PAC 6 Clicks Score (PT) 11  -     Highest level of mobility 4 --> Transferred to chair/commode  -       Row Name 11/08/23 1051          Functional Assessment    Outcome Measure Options AM-PAC 6 Clicks Basic Mobility (PT)  -               User Key  (r) = Recorded By, (t) = Taken By, (c) = Cosigned By      Initials Name Provider Type    Massiel Mosley PT Physical  Therapist                                 Physical Therapy Education       Title: PT OT SLP Therapies (In Progress)       Topic: Physical Therapy (In Progress)       Point: Mobility training (In Progress)       Learning Progress Summary             Patient Acceptance, E,TB, NR by  at 11/8/2023 1051                         Point: Home exercise program (Not Started)       Learner Progress:  Not documented in this visit.              Point: Body mechanics (In Progress)       Learning Progress Summary             Patient Acceptance, E,TB, NR by  at 11/8/2023 1051                         Point: Precautions (In Progress)       Learning Progress Summary             Patient Acceptance, E,TB, NR by  at 11/8/2023 1051                                         User Key       Initials Effective Dates Name Provider Type Discipline     09/22/22 -  Massiel Mckeon, CARMEN Physical Therapist PT                  PT Recommendation and Plan  Planned Therapy Interventions (PT): balance training, bed mobility training, gait training, home exercise program, neuromuscular re-education, patient/family education, postural re-education, stair training, strengthening, transfer training  Plan of Care Reviewed With: patient  Progress: no change  Outcome Evaluation: PT eval completed. Pt completed a bed to chair transfer Mod A x2 with FWx with further mobility limited d/t pain. Pt demonstrated mobility below baseline function with pain limiting function, strength deficits, and balance deficits warranting IPPT POC. d/c rec SNF.     Time Calculation:   PT Evaluation Complexity  History, PT Evaluation Complexity: 3 or more personal factors and/or comorbidities  Examination of Body Systems (PT Eval Complexity): total of 3 or more elements  Clinical Presentation (PT Evaluation Complexity): evolving  Clinical Decision Making (PT Evaluation Complexity): moderate complexity  Overall Complexity (PT Evaluation Complexity): moderate complexity     PT  Charges       Row Name 11/08/23 1052             Time Calculation    Start Time 0850  -HM      PT Received On 11/08/23  -HM      PT Goal Re-Cert Due Date 11/18/23  -HM         Untimed Charges    PT Eval/Re-eval Minutes 46  -HM         Total Minutes    Untimed Charges Total Minutes 46  -HM       Total Minutes 46  -HM                User Key  (r) = Recorded By, (t) = Taken By, (c) = Cosigned By      Initials Name Provider Type     Massiel Mckeon, PT Physical Therapist                  Therapy Charges for Today       Code Description Service Date Service Provider Modifiers Qty    21809194261 HC PT EVAL MOD COMPLEXITY 4 11/8/2023 Massiel Mckeon, PT GP 1            PT G-Codes  Outcome Measure Options: AM-PAC 6 Clicks Basic Mobility (PT)  AM-PAC 6 Clicks Score (PT): 11  PT Discharge Summary  Anticipated Discharge Disposition (PT): skilled nursing facility    Massiel Mckeon PT  11/8/2023

## 2023-11-08 NOTE — CASE MANAGEMENT/SOCIAL WORK
Discharge Planning Assessment  [facility]     Patient Name: Caesar Kim  MRN: [id number]  Today's Date: 11/8/2023    Admit Date: 11/7/2023    Plan: IRF/SNF   Discharge Needs Assessment    No documentation.                  Discharge Plan       Row Name 11/08/23 1400       Plan    Plan IRF/SNF    Patient/Family in Agreement with Plan yes    Plan Comments Met with pt and wife at bedside to f/u DCP.  Discussed therapy recs for SNF.  Per request, CM called referals to [facility] and [facility].  Will cont to follow for potential bed offers.    Final Discharge Disposition Code 03 - skilled nursing facility (SNF)                  Continued Care and Services - Admitted Since 11/7/2023       Destination       Service Provider Request Status Selected Services Address Phone Fax Patient Preferred    [facility] Pending - No Request Sent N/A [address] [facility] [id number] [phone] --    [facility] Pending - No Request Sent N/A [address] [facility] [id number] [phone] --                     Demographic Summary    No documentation.                  Functional Status    No documentation.                  Psychosocial    No documentation.                  Abuse/Neglect    No documentation.                  Legal    No documentation.                  Substance Abuse    No documentation.                  Patient Forms    No documentation.                     Tanja Cisneros RN     not motivated to quit

## 2023-11-08 NOTE — PROGRESS NOTES
[facility]  PROGRESS NOTE    Patient Name: Caesar Kim  : 1940  MRN: [id number]    Date of Admission: 2023  Primary Care Physician: Provider, No Known    Subjective   Subjective     CC:  AMS, fall, right hip pain    HPI:  States that he feels about 87%.  C/o pain in his feet.  Denies any fentanyl?       Objective   Objective     Vital Signs:   Temp:  [97.5 °F (36.4 °C)-98.1 °F (36.7 °C)] 97.5 °F (36.4 °C)  Heart Rate:  [61-84] 71  Resp:  [16-22] 16  BP: ()/(61-92) 92/61  Flow (L/min):  [2] 2     Physical Exam:  Constitutional: No acute distress, awake, alert, sitting up in chair  HENT: NCAT, mucous membranes moist  Respiratory: Clear to auscultation bilaterally, respiratory effort normal   Cardiovascular: RRR, no murmurs, rubs, or gallops  Gastrointestinal: Positive bowel sounds, soft, nontender, nondistended  Musculoskeletal: No bilateral ankle edema  Psychiatric: Appropriate affect, cooperative  Neurologic: Oriented x 2 (not date), moves all extremities, speech clear  Skin: No rashes      Results Reviewed:  LAB RESULTS:      Lab 23  0548 23  1427   WBC 5.24 6.66   HEMOGLOBIN 12.5* 14.7   HEMATOCRIT 39.4 46.7   PLATELETS 262 283   NEUTROS ABS  --  4.21   IMMATURE GRANS (ABS)  --  0.07*   LYMPHS ABS  --  1.30   MONOS ABS  --  0.83   EOS ABS  --  0.18   MCV 90.2 90.9         Lab 23  0548 23  1427   SODIUM 141 141   POTASSIUM 4.0 4.2   CHLORIDE 107 104   CO2 23.0 28.0   ANION GAP 11.0 9.0   BUN 14 13   CREATININE 0.72* 0.84   EGFR 90.7 86.5   GLUCOSE 82 124*   CALCIUM 8.9 9.5         Lab 23  1427   TOTAL PROTEIN 7.5   ALBUMIN 3.9   GLOBULIN 3.6   ALT (SGPT) 18   AST (SGOT) 34   BILIRUBIN 0.6   ALK PHOS 62                     Brief Urine Lab Results  (Last result in the past 365 days)        Color   Clarity   Blood   Leuk Est   Nitrite   Protein   CREAT   Urine HCG        23 1526 Yellow   Clear   Negative   Moderate  (2+)   Negative   Negative                   Microbiology Results Abnormal       Procedure Component Value - Date/Time    Urine Culture - Urine, Urine, Clean Catch [[id number]] Collected: 11/07/23 1526    Lab Status: Final result Specimen: Urine, Clean Catch Updated: 11/08/23 1151     Urine Culture 25,000 CFU/mL Normal Urogenital Liv    Narrative:      Colonization of the urinary tract without infection is common. Treatment is discouraged unless the patient is symptomatic, pregnant, or undergoing an invasive urologic procedure.            CT Pelvis Without Contrast    Result Date: 11/7/2023  CT PELVIS WO CONTRAST Date of Exam: 11/7/2023 1:34 PM CST Indication: right hip pain, neg xray. Comparison: None available. Technique: Axial CT images were obtained of the pelvis without contrast administration.  Reconstructed coronal and sagittal images were also obtained. Automated exposure control and iterative construction methods were used. Findings: GASTROINTESTINAL:  Visualized portions are unremarkable. LYMPH NODES:  Unremarkable REPRODUCTIVE: Irregular prostate gland impresses upon the urinary bladder base. BLADDER:  Unremarkable OSSEUS STRUCTURES: No acute fracture identified. There is bilateral mild to moderate hip degenerative arthrosis and degenerative changes of the visualized lumbar spine. There is minimal subcutaneous edema/ecchymosis along the outer margin of the right greater trochanter. No discrete hematoma. There is a bifurcated abdominal aortic stent graft     Impression: Impression: 1.No acute osseous injury is identified. Electronically Signed: Uriel Groves MD  11/7/2023 1:48 PM CST  Workstation ID: [id number]    CT Head Without Contrast    Result Date: 11/7/2023  CT HEAD WO CONTRAST Date of Exam: 11/7/2023 1:34 PM CST Indication: fall, eliquis. Comparison: 5/2/2023 Technique: Axial CT images were obtained of the head without contrast administration.  Automated exposure control and iterative construction  methods were used. Findings: There is no evidence of acute territorial infarction. There there is no acute intracranial hemorrhage. There are no extra-axial collections. Ventricles and CSF spaces are symmetric. No mass effect nor hydrocephalus. Brain parenchyma appears normal for age with moderate nonspecific white matter changes likely reflective of microvascular ischemic disease..  Paranasal sinuses and mastoid air cells are adequately aerated.  Osseous structures and orbits appear intact.     Impression: Impression: No acute intracranial process identified. Electronically Signed: Uriel Groves MD  11/7/2023 1:44 PM CST  Workstation ID: [id number]    XR Hip With or Without Pelvis 2 - 3 View Right    Result Date: 11/7/2023  XR HIP W OR WO PELVIS 2-3 VIEW RIGHT Date of Exam: 11/7/2023 1:27 PM EST Indication: fall, pain Comparison: None available. Findings: The right hip joint is congruent without evidence of acute fracture or traumatic malalignment. Mild degenerative change of the right hip joint. No acute osseous findings in the pelvis. Grossly unremarkable appearance of the partially valuated left hip. Aortobiiliac stent graft is noted. A couple surgical clips are seen in the lower central pelvis.     Impression: Impression: No acute fracture or malalignment. Electronically Signed: Ramu Ocampo MD  11/7/2023 2:25 PM EST  Workstation ID: [id number]         Current medications:  Scheduled Meds:apixaban, 5 mg, Oral, BID  famotidine, 40 mg, Oral, Daily  finasteride, 5 mg, Oral, Daily  lacosamide, 100 mg, Oral, Daily  losartan, 25 mg, Oral, Daily  pantoprazole, 40 mg, Oral, Q AM  PHENobarbital, 32.4 mg, Oral, Nightly  pramipexole, 1.5 mg, Oral, BID  senna-docusate sodium, 2 tablet, Oral, BID  sodium chloride, 10 mL, Intravenous, Q12H      Continuous Infusions:sodium chloride, 125 mL/hr, Last Rate: Stopped (11/08/23 0918)      PRN Meds:.  acetaminophen    senna-docusate sodium **AND** polyethylene glycol **AND**  bisacodyl **AND** bisacodyl    Calcium Replacement - Follow Nurse / BPA Driven Protocol    Magnesium Standard Dose Replacement - Follow Nurse / BPA Driven Protocol    melatonin    ondansetron **OR** ondansetron    Phosphorus Replacement - Follow Nurse / BPA Driven Protocol    Potassium Replacement - Follow Nurse / BPA Driven Protocol    sodium chloride    sodium chloride    Assessment & Plan   Assessment & Plan     Active Hospital Problems    Diagnosis  POA    **Rhabdomyolysis [M62.82]  Yes    Fall [W19.XXXA]  Yes    Right hip pain [M25.551]  Yes    Altered mental status [R41.82]  Yes    Essential hypertension [I10]  Yes    Atrial fibrillation [I48.91]  Yes      Resolved Hospital Problems   No resolved problems to display.        Brief Hospital Course to date:  Caesar Kim is a 83 y.o. male with hx of HTN, HLD, chronic diastolic CHF, COPD, GERD, PE, Afib on Eliquis, BPH, RLS, seizure disorder, and memory loss who presents from home due to a fall with right hip pain along with altered mental status. Patient fell at some point in the morning 11/6/23 and laid there for about 24 hours until home health arrived 11/7/23 at which time he was brought to the ER. CT head, x-ray right hip, and CT pelvis were all negative for acute findings. Labs showed elevated , otherwise unremarkable. Admitted for further management.      Rhabdomyolysis  Fall  Right hip pain  -- on admission  --UA with moderate leukocytes and TNTC WBC, no bacteria  --Continue NS at 125 cc/hr  --CK level trending down  --PT/OT evaluation  --right hip xrays shows no fracture     Altered mental status  Underlying memory loss/mild cognitive impairment  --Presume secondary to dehydration, rhabdomyolysis   --UA with moderate leukocytes and TNTC WBC, no bacteria; did have recent Enterococcus faecalis UTI treated at [facility]  --urine culture growing 25K normal judson  --afebrile with normal WBC will defer ATBx at this time    RLE swelling  --check  duplex     Recent COVID-19  --Per Shelby Lan note from Dr. Govea 10/23/23, patient had one day of fevers followed by modest URI symptoms, was not a candidate for Paxlovid due to interactions with his other medications  --Has been more than two weeks since his diagnosis, out of isolation     HTN  HLD  Chronic diastolic CHF  --Continue home meds but hold Lasix for now     Afib  Hx of PE  --Continue Eliquis     GERD  --Continue Pepcid, Protonix     Seizure disorder  --Phenobarbital level WNL  --Follows with Dr. Garcia, on Vimpat and Phenobarbital, will continue  --Consider EEG if mental status does not improve with above treatment but low suspicion for seizure at this time     BPH  --Continue Proscar     RLS  --Continue Mirapex           Expected Discharge Location and Transportation:   Expected Discharge   Expected discharge date/ time has not been documented.     DVT prophylaxis:  Medical DVT prophylaxis orders are present.     AM-PAC 6 Clicks Score (PT): 11 (11/08/23 1051)    CODE STATUS:   Code Status and Medical Interventions:   Ordered at: 11/07/23 1553     Medical Intervention Limits:    NO intubation (DNI)     Code Status (Patient has no pulse and is not breathing):    No CPR (Do Not Attempt to Resuscitate)     Medical Interventions (Patient has pulse or is breathing):    Limited Support       Abram Beck MD  11/08/23

## 2023-11-08 NOTE — PLAN OF CARE
Goal Outcome Evaluation:  Plan of Care Reviewed With: patient        Progress: no change  Outcome Evaluation: PT eval completed. Pt completed a bed to chair transfer Mod A x2 with FWx with further mobility limited d/t pain. Pt demonstrated mobility below baseline function with pain limiting function, strength deficits, and balance deficits warranting IPPT POC. d/c rec SNF.      Anticipated Discharge Disposition (PT): skilled nursing facility

## 2023-11-08 NOTE — THERAPY EVALUATION
Patient Name: Caesar Kim  : 1940    MRN: [id number]                              Today's Date: 2023       Admit Date: 2023    Visit Dx:     ICD-10-CM ICD-9-CM   1. Arthralgia of right hip  M25.551 719.45   2. Contusion of right hip, initial encounter  S70.01XA 924.01   3. Traumatic rhabdomyolysis, initial encounter  T79.6XXA 958.6   4. Confusion  R41.0 298.9   5. Mild dehydration  E86.0 276.51     Patient Active Problem List   Diagnosis    Urinary tract infection    Muscle weakness    Encephalopathy, unspecified    Rhabdomyolysis    Fall    Right hip pain    Altered mental status    Essential hypertension    Atrial fibrillation     Past Medical History:   Diagnosis Date    AAA (abdominal aortic aneurysm)     Arthritis     Atrial fibrillation     BPH (benign prostatic hyperplasia)     CHF (congestive heart failure)     COPD (chronic obstructive pulmonary disease)     Diabetes mellitus     Elevated cholesterol     GERD (gastroesophageal reflux disease)     Hypertension     Memory loss     Personal history of pulmonary embolism     Pulmonary embolism     Restless leg syndrome     Sleep apnea     Uses CPAP    Unspecified convulsions     Unspecified diastolic (congestive) heart failure     Urinary tract infection      Past Surgical History:   Procedure Laterality Date    CHOLECYSTECTOMY      COLONOSCOPY      CYSTOSCOPY TRANSURETHRAL RESECTION OF PROSTATE N/A 2022    Procedure: CYSTOSCOPY TRANSURETHRAL RESECTION OF PROSTATE GREENLIGHT;  Surgeon: Darius Costa MD;  Location: [facility] [place];  Service: Urology;  Laterality: N/A;    ENDOSCOPY      EYE SURGERY Bilateral     CATARACTS    SHOULDER SURGERY Left     SKIN BIOPSY      NECK    TONSILLECTOMY      AND ADENOIDS      General Information       Row Name 23 0942          OT Time and Intention    Document Type evaluation  -LC     Mode of Treatment occupational therapy  -LC       Row Name 23 0942          General Information    Patient  Profile Reviewed yes  -     Prior Level of Function --  Limited historian, reports use of rw at baseline  -     Existing Precautions/Restrictions fall;oxygen therapy device and L/min  -     Barriers to Rehab medically complex;previous functional deficit;cognitive status  -       Row Name 11/08/23 0942          Living Environment    People in Home spouse  -       Row Name 11/08/23 0942          Home Main Entrance    Number of Stairs, Main Entrance two  -     Stair Railings, Main Entrance railings safe and in good condition  -       Row Name 11/08/23 0942          Stairs Within Home, Primary    Number of Stairs, Within Home, Primary none  -       Row Name 11/08/23 0942          Cognition    Orientation Status (Cognition) oriented to;person;verbal cues/prompts needed for orientation;disoriented to;place;situation;time  -       Row Name 11/08/23 0942          Safety Issues, Functional Mobility    Safety Issues Affecting Function (Mobility) awareness of need for assistance;insight into deficits/self-awareness;safety precaution awareness;safety precautions follow-through/compliance;sequencing abilities  -     Impairments Affecting Function (Mobility) balance;cognition;endurance/activity tolerance;pain;shortness of breath;strength  -     Cognitive Impairments, Mobility Safety/Performance awareness, need for assistance;insight into deficits/self-awareness;judgment;problem-solving/reasoning;safety precaution awareness;safety precaution follow-through;sequencing abilities  -               User Key  (r) = Recorded By, (t) = Taken By, (c) = Cosigned By      Initials Name Provider Type     Daria Durbin OT Occupational Therapist                     Mobility/ADL's       Row Name 11/08/23 0980          Bed Mobility    Bed Mobility scooting/bridging;supine-sit  -     Scooting/[place]rid[place] (Bed Mobility) 2 person assist;maximum assist (25% patient effort);verbal cues  -     Supine-Sit  [place] (Bed Mobility) maximum assist (25% patient effort);2 person assist;verbal cues  -     Assistive Device (Bed Mobility) bed rails;head of bed elevated;draw sheet  -     Comment, (Bed Mobility) VC's to sequence transitioning from supine to sit EOB. Assist to bring B LE's to EOB and trunk into upright position.  -       Row Name 11/08/23 0945          Transfers    Transfers sit-stand transfer;bed-chair transfer  -     Comment, (Transfers) VC's for hand placement and sequencing.  -       Row Name 11/08/23 0945          Bed-Chair Transfer    Bed-Chair [place] (Transfers) moderate assist (50% patient effort);2 person assist;verbal cues  -     Assistive Device (Bed-Chair Transfers) walker, front-wheeled  -       Row Name 11/08/23 0945          Sit-Stand Transfer    Sit-Stand [place] (Transfers) moderate assist (50% patient effort);2 person assist;verbal cues  -     Assistive Device (Sit-Stand Transfers) walker, front-wheeled  -       Row Name 11/08/23 0945          Activities of Daily Living    BADL Assessment/Intervention grooming;lower body dressing  -[facility] Name 11/08/23 0945          Grooming Assessment/Training    [place] Level (Grooming) grooming skills;set up  -[facility] Name 11/08/23 0945          Lower Body Dressing Assessment/Training    [place] Level (Lower Body Dressing) don;socks;dependent (less than 25% patient effort)  -     Position (Lower Body Dressing) supine  -               User Key  (r) = Recorded By, (t) = Taken By, (c) = Cosigned By      Initials Name Provider Type     Daria Durbin OT Occupational Therapist                   Obj/Interventions       Row Name 11/08/23 0949          Sensory Assessment (Somatosensory)    Sensory Assessment (Somatosensory) UE sensation intact  -[facility] Name 11/08/23 0949          Range of Motion Comprehensive    General Range of Motion bilateral upper extremity ROM WFL  -[facility] Name 11/08/23  0949          Strength Comprehensive (MMT)    General Manual Muscle Testing (MMT) Assessment upper extremity strength deficits identified  -       Row Name 11/08/23 0949          Upper Extremity (Manual Muscle Testing)    Upper Extremity: Manual Muscle Testing (MMT) left UE strength is WFL;right UE strength is WFL  -       Row Name 11/08/23 0949          Balance    Balance Assessment sitting static balance;sitting dynamic balance;standing static balance;standing dynamic balance  -     Static Sitting Balance contact guard;verbal cues  -LC     Dynamic Sitting Balance contact guard;verbal cues  -LC     Position, Sitting Balance unsupported;sitting edge of bed  -     Static Standing Balance moderate assist;2-person assist;verbal cues  -LC     Dynamic Standing Balance moderate assist;2-person assist;verbal cues  -LC     Position/Device Used, Standing Balance supported;walker, front-wheeled  -     Balance Interventions sitting;standing;sit to stand;supported;occupation based/functional task;weight shifting activity  -     Comment, Balance VC's for upright posture  -               User Key  (r) = Recorded By, (t) = Taken By, (c) = Cosigned By      Initials Name Provider Type     Daria Durbin OT Occupational Therapist                   Goals/Plan       Row Name 11/08/23 1104          Transfer Goal 1 (OT)    Activity/Assistive Device (Transfer Goal 1, OT) bed-to-chair/chair-to-bed;sit-to-stand/stand-to-sit;walker, rolling  -     [place] Level/Cues Needed (Transfer Goal 1, OT) minimum assist (75% or more patient effort);verbal cues required  -     Time Frame (Transfer Goal 1, OT) long term goal (LTG);by discharge  -     Progress/Outcome (Transfer Goal 1, OT) goal ongoing  -       Row Name 11/08/23 1104          Dressing Goal 1 (OT)    Activity/Device (Dressing Goal 1, OT) upper body dressing  -     [place]/Cues Needed (Dressing Goal 1, OT) set-up required;verbal cues required  -      Time Frame (Dressing Goal 1, OT) long term goal (LTG);by discharge  -     Progress/Outcome (Dressing Goal 1, OT) goal ongoing  -       Row Name 11/08/23 1104          Toileting Goal 1 (OT)    Activity/Device (Toileting Goal 1, OT) adjust/manage clothing;perform perineal hygiene;commode, bedside without drop arms  -     [place] Level/Cues Needed (Toileting Goal 1, OT) moderate assist (50-74% patient effort);verbal cues required  -     Time Frame (Toileting Goal 1, OT) long term goal (LTG);by discharge  -     Progress/Outcome (Toileting Goal 1, OT) goal ongoing  -               User Key  (r) = Recorded By, (t) = Taken By, (c) = Cosigned By      Initials Name Provider Type    Daria Leon, MARIAH Occupational Therapist                   Clinical Impression       Row Name 11/08/23 1056          Pain Assessment    Pain Intervention(s) Repositioned;Ambulation/increased activity;Nursing Notified  -     Additional Documentation Pain Scale: FACES Pre/Post-Treatment (Group)  -       Row Name 11/08/23 1056          Pain Scale: FACES Pre/Post-Treatment    Pain: FACES Scale, Pretreatment 2-->hurts little bit  -     Posttreatment Pain Rating 2-->hurts little bit  -LC     Pain Location - Side/Orientation Right  -     Pain Location generalized  -     Pain Location - hip  -       Row Name 11/08/23 1056          Plan of Care Review    Plan of Care Reviewed With patient  -     Progress no change  -     Outcome Evaluation Pt. presents below baseline with ADLs and functional mobility. Limited by decreased activity tolerance, generalized weakness, balance, and pain. Skilled OT services warranted to promote return to PLOF. Recommend SNF at discharge.  -       Row Name 11/08/23 1056          Therapy Assessment/Plan (OT)    Patient/Family Therapy Goal Statement (OT) To return to PLOF  -     Therapy Frequency (OT) daily  -       Row Name 11/08/23 1056          Therapy Plan Review/Discharge Plan (OT)     Anticipated Discharge Disposition (OT) skilled nursing facility  -       Row Name 11/08/23 1056          Vital Signs    Pre Systolic BP Rehab 103  -LC     Pre Treatment Diastolic BP 65  -LC     Post Systolic BP Rehab 102  -LC     Post Treatment Diastolic BP 86  -LC     Pre SpO2 (%) 95  -LC     O2 Delivery Pre Treatment nasal cannula  -LC     Post SpO2 (%) 91  -LC     O2 Delivery Post Treatment nasal cannula  -LC     Pre Patient Position Supine  -LC     Post Patient Position Sitting  -       Row Name 11/08/23 1056          Positioning and Restraints    Pre-Treatment Position in bed  -LC     Post Treatment Position chair  -LC     In Chair notified nsg;reclined;call light within reach;encouraged to call for assist;exit alarm on;waffle cushion;legs elevated  -               User Key  (r) = Recorded By, (t) = Taken By, (c) = Cosigned By      Initials Name Provider Type    LC Daria Durbin, MARIAH Occupational Therapist                   Outcome Measures       Row Name 11/08/23 1105          How much help from another is currently needed...    Putting on and taking off regular lower body clothing? 1  -LC     Bathing (including washing, rinsing, and drying) 2  -LC     Toileting (which includes using toilet bed pan or urinal) 2  -LC     Putting on and taking off regular upper body clothing 2  -LC     Taking care of personal grooming (such as brushing teeth) 3  -LC     Eating meals 3  -LC     AM-PAC 6 Clicks Score (OT) 13  -LC       Row Name 11/08/23 1051          How much help from another person do you currently need...    Turning from your back to your side while in flat bed without using bedrails? 2  -HM     Moving from lying on back to sitting on the side of a flat bed without bedrails? 2  -HM     Moving to and from a bed to a chair (including a wheelchair)? 2  -HM     Standing up from a chair using your arms (e.g., wheelchair, bedside chair)? 2  -HM     Climbing 3-5 steps with a railing? 1  -HM     To walk in  hospital room? 2  -HM     AM-PAC 6 Clicks Score (PT) 11  -     Highest level of mobility 4 --> Transferred to chair/commode  -       Row Name 11/08/23 1105 11/08/23 1051       Functional Assessment    Outcome Measure Options AM-PAC 6 Clicks Daily Activity (OT)  - AM-PAC 6 Clicks Basic Mobility (PT)  -              User Key  (r) = Recorded By, (t) = Taken By, (c) = Cosigned By      Initials Name Provider Type     Daria Durbin, MARIAH Occupational Therapist     Massiel Mckeon, CARMEN Physical Therapist                    Occupational Therapy Education       Title: PT OT SLP Therapies (In Progress)       Topic: Occupational Therapy (In Progress)       Point: ADL training (Done)       Description:   Instruct learner(s) on proper safety adaptation and remediation techniques during self care or transfers.   Instruct in proper use of assistive devices.                  Learning Progress Summary             Patient Acceptance, E, VU by  at 11/8/2023 0848                         Point: Home exercise program (Not Started)       Description:   Instruct learner(s) on appropriate technique for monitoring, assisting and/or progressing therapeutic exercises/activities.                  Learner Progress:  Not documented in this visit.              Point: Precautions (Done)       Description:   Instruct learner(s) on prescribed precautions during self-care and functional transfers.                  Learning Progress Summary             Patient Acceptance, E, VU by  at 11/8/2023 0848                         Point: Body mechanics (Done)       Description:   Instruct learner(s) on proper positioning and spine alignment during self-care, functional mobility activities and/or exercises.                  Learning Progress Summary             Patient Acceptance, E, VU by  at 11/8/2023 0848                                         User Key       Initials Effective Dates Name Provider Type [facility] 06/16/21 -  Ramakrishna  MARIAH Huff Occupational Therapist OT                  OT Recommendation and Plan  Therapy Frequency (OT): daily  Plan of Care Review  Plan of Care Reviewed With: patient  Progress: no change  Outcome Evaluation: Pt. presents below baseline with ADLs and functional mobility. Limited by decreased activity tolerance, generalized weakness, balance, and pain. Skilled OT services warranted to promote return to PLOF. Recommend SNF at discharge.     Time Calculation:   Evaluation Complexity (OT)  Review Occupational Profile/Medical/Therapy History Complexity: brief/low complexity  Assessment, Occupational Performance/Identification of Deficit Complexity: 1-3 performance deficits  Clinical Decision Making Complexity (OT): problem focused assessment/low complexity  Overall Complexity of Evaluation (OT): low complexity     Time Calculation- OT       Row Name 11/08/23 1107             Time Calculation- OT    OT Start Time 0848  -LC      OT Received On 11/08/23  -LC      OT Goal Re-Cert Due Date 11/18/23  -         Untimed Charges    OT Eval/Re-eval Minutes 52  -LC         Total Minutes    Untimed Charges Total Minutes 52  -LC       Total Minutes 52  -LC                User Key  (r) = Recorded By, (t) = Taken By, (c) = Cosigned By      Initials Name Provider Type    LC Daria Durbin OT Occupational Therapist                  Therapy Charges for Today       Code Description Service Date Service Provider Modifiers Qty    [id number] HC OT EVAL LOW COMPLEXITY 4 11/8/2023 Daria Durbin OT GO 1                 Daria Durbin OT  11/8/2023

## 2023-11-09 ENCOUNTER — APPOINTMENT (OUTPATIENT)
Dept: CARDIOLOGY | Facility: HOSPITAL | Age: 83
DRG: 565 | End: 2023-11-09
Payer: MEDICARE

## 2023-11-09 LAB
BH CV LOWER VASCULAR LEFT ANTERIOR SAPH COMPRESS: NORMAL
BH CV LOWER VASCULAR LEFT COMMON FEMORAL AUGMENT: NORMAL
BH CV LOWER VASCULAR LEFT COMMON FEMORAL COMPRESS: NORMAL
BH CV LOWER VASCULAR LEFT COMMON FEMORAL PHASIC: NORMAL
BH CV LOWER VASCULAR LEFT COMMON FEMORAL SPONT: NORMAL
BH CV LOWER VASCULAR LEFT DISTAL FEMORAL COMPRESS: NORMAL
BH CV LOWER VASCULAR LEFT GASTRONEMIUS COMPRESS: NORMAL
BH CV LOWER VASCULAR LEFT GREATER SAPH AK COMPRESS: NORMAL
BH CV LOWER VASCULAR LEFT GREATER SAPH BK COMPRESS: NORMAL
BH CV LOWER VASCULAR LEFT LESSER SAPH COMPRESS: NORMAL
BH CV LOWER VASCULAR LEFT MID FEMORAL AUGMENT: NORMAL
BH CV LOWER VASCULAR LEFT MID FEMORAL COMPRESS: NORMAL
BH CV LOWER VASCULAR LEFT MID FEMORAL PHASIC: NORMAL
BH CV LOWER VASCULAR LEFT MID FEMORAL SPONT: NORMAL
BH CV LOWER VASCULAR LEFT PERONEAL COMPRESS: NORMAL
BH CV LOWER VASCULAR LEFT POPLITEAL AUGMENT: NORMAL
BH CV LOWER VASCULAR LEFT POPLITEAL COMPRESS: NORMAL
BH CV LOWER VASCULAR LEFT POPLITEAL PHASIC: NORMAL
BH CV LOWER VASCULAR LEFT POPLITEAL SPONT: NORMAL
BH CV LOWER VASCULAR LEFT POSTERIOR TIBIAL COMPRESS: NORMAL
BH CV LOWER VASCULAR LEFT PROFUNDA FEMORAL COMPRESS: NORMAL
BH CV LOWER VASCULAR LEFT PROFUNDA FEMORAL PHASIC: NORMAL
BH CV LOWER VASCULAR LEFT PROFUNDA FEMORAL SPONT: NORMAL
BH CV LOWER VASCULAR LEFT PROXIMAL FEMORAL COMPRESS: NORMAL
BH CV LOWER VASCULAR LEFT SAPHENOFEMORAL JUNCTION COMPRESS: NORMAL
BH CV LOWER VASCULAR RIGHT COMMON FEMORAL AUGMENT: NORMAL
BH CV LOWER VASCULAR RIGHT COMMON FEMORAL COMPRESS: NORMAL
BH CV LOWER VASCULAR RIGHT COMMON FEMORAL PHASIC: NORMAL
BH CV LOWER VASCULAR RIGHT COMMON FEMORAL SPONT: NORMAL
BH CV LOWER VASCULAR RIGHT DISTAL FEMORAL COMPRESS: NORMAL
BH CV LOWER VASCULAR RIGHT GASTRONEMIUS COMPRESS: NORMAL
BH CV LOWER VASCULAR RIGHT GREATER SAPH AK COMPRESS: NORMAL
BH CV LOWER VASCULAR RIGHT GREATER SAPH BK COMPRESS: NORMAL
BH CV LOWER VASCULAR RIGHT LESSER SAPH COMPRESS: NORMAL
BH CV LOWER VASCULAR RIGHT MID FEMORAL AUGMENT: NORMAL
BH CV LOWER VASCULAR RIGHT MID FEMORAL COMPRESS: NORMAL
BH CV LOWER VASCULAR RIGHT MID FEMORAL PHASIC: NORMAL
BH CV LOWER VASCULAR RIGHT MID FEMORAL SPONT: NORMAL
BH CV LOWER VASCULAR RIGHT PERONEAL COMPRESS: NORMAL
BH CV LOWER VASCULAR RIGHT POPLITEAL AUGMENT: NORMAL
BH CV LOWER VASCULAR RIGHT POPLITEAL COMPETENT: NORMAL
BH CV LOWER VASCULAR RIGHT POPLITEAL COMPRESS: NORMAL
BH CV LOWER VASCULAR RIGHT POPLITEAL PHASIC: NORMAL
BH CV LOWER VASCULAR RIGHT POPLITEAL SPONT: NORMAL
BH CV LOWER VASCULAR RIGHT POSTERIOR TIBIAL COMPRESS: NORMAL
BH CV LOWER VASCULAR RIGHT PROFUNDA FEMORAL PHASIC: NORMAL
BH CV LOWER VASCULAR RIGHT PROFUNDA FEMORAL SPONT: NORMAL
BH CV LOWER VASCULAR RIGHT PROXIMAL FEMORAL COMPRESS: NORMAL
BH CV LOWER VASCULAR RIGHT SAPHENOFEMORAL JUNCTION COMPRESS: NORMAL

## 2023-11-09 PROCEDURE — 93970 EXTREMITY STUDY: CPT

## 2023-11-09 PROCEDURE — 25810000003 SODIUM CHLORIDE 0.9 % SOLUTION: Performed by: STUDENT IN AN ORGANIZED HEALTH CARE EDUCATION/TRAINING PROGRAM

## 2023-11-09 PROCEDURE — 99232 SBSQ HOSP IP/OBS MODERATE 35: CPT | Performed by: STUDENT IN AN ORGANIZED HEALTH CARE EDUCATION/TRAINING PROGRAM

## 2023-11-09 PROCEDURE — 93970 EXTREMITY STUDY: CPT | Performed by: INTERNAL MEDICINE

## 2023-11-09 RX ORDER — GABAPENTIN 100 MG/1
100 CAPSULE ORAL 3 TIMES DAILY PRN
Status: DISCONTINUED | OUTPATIENT
Start: 2023-11-09 | End: 2023-11-10 | Stop reason: HOSPADM

## 2023-11-09 RX ORDER — SODIUM CHLORIDE 9 MG/ML
125 INJECTION, SOLUTION INTRAVENOUS CONTINUOUS
Status: ACTIVE | OUTPATIENT
Start: 2023-11-09 | End: 2023-11-09

## 2023-11-09 RX ADMIN — SENNOSIDES AND DOCUSATE SODIUM 2 TABLET: 8.6; 5 TABLET ORAL at 10:29

## 2023-11-09 RX ADMIN — APIXABAN 5 MG: 5 TABLET, FILM COATED ORAL at 20:33

## 2023-11-09 RX ADMIN — Medication 5 MG: at 23:43

## 2023-11-09 RX ADMIN — LOSARTAN POTASSIUM 25 MG: 25 TABLET, FILM COATED ORAL at 10:29

## 2023-11-09 RX ADMIN — PRAMIPEXOLE DIHYDROCHLORIDE 1.5 MG: 1 TABLET ORAL at 10:30

## 2023-11-09 RX ADMIN — ACETAMINOPHEN 650 MG: 325 TABLET ORAL at 23:43

## 2023-11-09 RX ADMIN — Medication 10 ML: at 20:33

## 2023-11-09 RX ADMIN — Medication 10 ML: at 10:32

## 2023-11-09 RX ADMIN — APIXABAN 5 MG: 5 TABLET, FILM COATED ORAL at 10:29

## 2023-11-09 RX ADMIN — FAMOTIDINE 40 MG: 20 TABLET, FILM COATED ORAL at 20:33

## 2023-11-09 RX ADMIN — ACETAMINOPHEN 650 MG: 325 TABLET ORAL at 03:49

## 2023-11-09 RX ADMIN — SODIUM CHLORIDE 125 ML/HR: 9 INJECTION, SOLUTION INTRAVENOUS at 12:55

## 2023-11-09 RX ADMIN — PHENOBARBITAL 32.4 MG: 32.4 TABLET ORAL at 20:33

## 2023-11-09 RX ADMIN — PANTOPRAZOLE SODIUM 40 MG: 40 TABLET, DELAYED RELEASE ORAL at 05:57

## 2023-11-09 RX ADMIN — PRAMIPEXOLE DIHYDROCHLORIDE 1.5 MG: 1 TABLET ORAL at 20:33

## 2023-11-09 RX ADMIN — SODIUM CHLORIDE 125 ML/HR: 9 INJECTION, SOLUTION INTRAVENOUS at 19:30

## 2023-11-09 RX ADMIN — FINASTERIDE 5 MG: 5 TABLET, FILM COATED ORAL at 10:29

## 2023-11-09 RX ADMIN — LACOSAMIDE 100 MG: 100 TABLET, FILM COATED ORAL at 10:30

## 2023-11-09 NOTE — PROGRESS NOTES
[facility] Medicine Services  PROGRESS NOTE    Patient Name: Caesar Kim  : 1940  MRN: [id number]    Date of Admission: 2023  Primary Care Physician: Provider, No Known    Subjective   Subjective     CC:  AMS, fall, right hip pain    HPI:  Still having foot pain, but states it is improving      Objective   Objective     Vital Signs:   Temp:  [97.7 °F (36.5 °C)-98.5 °F (36.9 °C)] 98.5 °F (36.9 °C)  Heart Rate:  [59-71] 61  Resp:  [18-22] 22  BP: (105-121)/(70-79) 115/72  Flow (L/min):  [2] 2     Physical Exam:  Constitutional: No acute distress, awake, alert, sitting up in chair  HENT: NCAT, mucous membranes moist  Respiratory: Clear to auscultation bilaterally, respiratory effort normal   Cardiovascular: RRR, no murmurs, rubs, or gallops  Gastrointestinal: Positive bowel sounds, soft, nontender, nondistended  Musculoskeletal: No bilateral ankle edema  Psychiatric: Appropriate affect, cooperative  Neurologic: Oriented x 2 (not date), moves all extremities, speech clear  Skin: No rashes      Results Reviewed:  LAB RESULTS:      Lab 23  0548 23  1427   WBC 5.24 6.66   HEMOGLOBIN 12.5* 14.7   HEMATOCRIT 39.4 46.7   PLATELETS 262 283   NEUTROS ABS  --  4.21   IMMATURE GRANS (ABS)  --  0.07*   LYMPHS ABS  --  1.30   MONOS ABS  --  0.83   EOS ABS  --  0.18   MCV 90.2 90.9         Lab 23  0548 23  1427   SODIUM 141 141   POTASSIUM 4.0 4.2   CHLORIDE 107 104   CO2 23.0 28.0   ANION GAP 11.0 9.0   BUN 14 13   CREATININE 0.72* 0.84   EGFR 90.7 86.5   GLUCOSE 82 124*   CALCIUM 8.9 9.5         Lab 23  1427   TOTAL PROTEIN 7.5   ALBUMIN 3.9   GLOBULIN 3.6   ALT (SGPT) 18   AST (SGOT) 34   BILIRUBIN 0.6   ALK PHOS 62                     Brief Urine Lab Results  (Last result in the past 365 days)        Color   Clarity   Blood   Leuk Est   Nitrite   Protein   CREAT   Urine HCG        23 1526 Yellow   Clear   Negative   Moderate (2+)   Negative   Negative                    Microbiology Results Abnormal       Procedure Component Value - Date/Time    Urine Culture - Urine, Urine, Clean Catch [[id number]] Collected: 11/07/23 1526    Lab Status: Final result Specimen: Urine, Clean Catch Updated: 11/08/23 1151     Urine Culture 25,000 CFU/mL Normal Urogenital Liv    Narrative:      Colonization of the urinary tract without infection is common. Treatment is discouraged unless the patient is symptomatic, pregnant, or undergoing an invasive urologic procedure.            CT Pelvis Without Contrast    Result Date: 11/7/2023  CT PELVIS WO CONTRAST Date of Exam: 11/7/2023 1:34 PM CST Indication: right hip pain, neg xray. Comparison: None available. Technique: Axial CT images were obtained of the pelvis without contrast administration.  Reconstructed coronal and sagittal images were also obtained. Automated exposure control and iterative construction methods were used. Findings: GASTROINTESTINAL:  Visualized portions are unremarkable. LYMPH NODES:  Unremarkable REPRODUCTIVE: Irregular prostate gland impresses upon the urinary bladder base. BLADDER:  Unremarkable OSSEUS STRUCTURES: No acute fracture identified. There is bilateral mild to moderate hip degenerative arthrosis and degenerative changes of the visualized lumbar spine. There is minimal subcutaneous edema/ecchymosis along the outer margin of the right greater trochanter. No discrete hematoma. There is a bifurcated abdominal aortic stent graft     Impression: Impression: 1.No acute osseous injury is identified. Electronically Signed: Uriel Groves MD  11/7/2023 1:48 PM CST  Workstation ID: [id number]    CT Head Without Contrast    Result Date: 11/7/2023  CT HEAD WO CONTRAST Date of Exam: 11/7/2023 1:34 PM CST Indication: fall, eliquis. Comparison: 5/2/2023 Technique: Axial CT images were obtained of the head without contrast administration.  Automated exposure control and iterative construction methods were used. Findings:  There is no evidence of acute territorial infarction. There there is no acute intracranial hemorrhage. There are no extra-axial collections. Ventricles and CSF spaces are symmetric. No mass effect nor hydrocephalus. Brain parenchyma appears normal for age with moderate nonspecific white matter changes likely reflective of microvascular ischemic disease..  Paranasal sinuses and mastoid air cells are adequately aerated.  Osseous structures and orbits appear intact.     Impression: Impression: No acute intracranial process identified. Electronically Signed: Uriel Groves MD  11/7/2023 1:44 PM CST  Workstation ID: [id number]    XR Hip With or Without Pelvis 2 - 3 View Right    Result Date: 11/7/2023  XR HIP W OR WO PELVIS 2-3 VIEW RIGHT Date of Exam: 11/7/2023 1:27 PM EST Indication: fall, pain Comparison: None available. Findings: The right hip joint is congruent without evidence of acute fracture or traumatic malalignment. Mild degenerative change of the right hip joint. No acute osseous findings in the pelvis. Grossly unremarkable appearance of the partially valuated left hip. Aortobiiliac stent graft is noted. A couple surgical clips are seen in the lower central pelvis.     Impression: Impression: No acute fracture or malalignment. Electronically Signed: Ramu Ocampo MD  11/7/2023 2:25 PM EST  Workstation ID: [id number]         Current medications:  Scheduled Meds:apixaban, 5 mg, Oral, BID  famotidine, 40 mg, Oral, Daily  finasteride, 5 mg, Oral, Daily  lacosamide, 100 mg, Oral, Daily  losartan, 25 mg, Oral, Daily  pantoprazole, 40 mg, Oral, Q AM  PHENobarbital, 32.4 mg, Oral, Nightly  pramipexole, 1.5 mg, Oral, BID  senna-docusate sodium, 2 tablet, Oral, BID  sodium chloride, 10 mL, Intravenous, Q12H      Continuous Infusions:sodium chloride, 125 mL/hr, Last Rate: Stopped (11/08/23 0918)  sodium chloride, 125 mL/hr, Last Rate: 125 mL/hr (11/09/23 1255)      PRN Meds:.  acetaminophen    senna-docusate sodium **AND**  polyethylene glycol **AND** bisacodyl **AND** bisacodyl    Calcium Replacement - Follow Nurse / BPA Driven Protocol    gabapentin    Magnesium Standard Dose Replacement - Follow Nurse / BPA Driven Protocol    melatonin    ondansetron **OR** ondansetron    Phosphorus Replacement - Follow Nurse / BPA Driven Protocol    Potassium Replacement - Follow Nurse / BPA Driven Protocol    sodium chloride    sodium chloride    Assessment & Plan   Assessment & Plan     Active Hospital Problems    Diagnosis  POA    **Rhabdomyolysis [M62.82]  Yes    Fall [W19.XXXA]  Yes    Right hip pain [M25.551]  Yes    Altered mental status [R41.82]  Yes    Essential hypertension [I10]  Yes    Atrial fibrillation [I48.91]  Yes      Resolved Hospital Problems   No resolved problems to display.        Brief Hospital Course to date:  Caesar Kim is a 83 y.o. male with hx of HTN, HLD, chronic diastolic CHF, COPD, GERD, PE, Afib on Eliquis, BPH, RLS, seizure disorder, and memory loss who presents from home due to a fall with right hip pain along with altered mental status. Patient fell at some point in the morning 11/6/23 and laid there for about 24 hours until home health arrived 11/7/23 at which time he was brought to the ER. CT head, x-ray right hip, and CT pelvis were all negative for acute findings. Labs showed elevated , otherwise unremarkable. Admitted for further management.      Rhabdomyolysis  Fall  Right hip pain  -- on admission  --UA with moderate leukocytes and TNTC WBC, no bacteria  --Continue NS at 125 cc/hr. Cont again today, CK trending down but still elevated  --CK level trending down  --PT/OT evaluation recs SNF  --right hip xrays shows no fracture     Altered mental status--resolved  Underlying memory loss/mild cognitive impairment  --Presume secondary to dehydration, rhabdomyolysis   --UA with moderate leukocytes and TNTC WBC, no bacteria; did have recent Enterococcus faecalis UTI treated at [facility]  --urine  culture growing 25K normal judson  --afebrile with normal WBC will defer ATBx at this time  --at neurologic baseline currently    RLE swelling  --check duplex     Recent COVID-19  --Per [facility] [place] note from Dr. Govea 10/23/23, patient had one day of fevers followed by modest URI symptoms, was not a candidate for Paxlovid due to interactions with his other medications  --Has been more than two weeks since his diagnosis, out of isolation     HTN  HLD  Chronic diastolic CHF  --Continue home meds but holding Lasix for now given elevated CK     Afib  Hx of PE  --Continue Eliquis     GERD  --Continue Pepcid, Protonix     Seizure disorder  --Phenobarbital level WNL  --Follows with Dr. Garcia, on Vimpat and Phenobarbital, will continue  --Consider EEG if mental status does not improve with above treatment but low suspicion for seizure at this time     BPH  --Continue Proscar     RLS  --Continue Mirapex  --added gabapentin for possible neuropathy    US LE duplex neg for DVT           Expected Discharge Location and Transportation: SNF  Expected Discharge   Expected Discharge Date: 11/10/2023; Expected Discharge Time:      DVT prophylaxis:  Medical DVT prophylaxis orders are present.     AM-PAC 6 Clicks Score (PT): 12 (11/09/23 0800)    CODE STATUS:   Code Status and Medical Interventions:   Ordered at: 11/07/23 1553     Medical Intervention Limits:    NO intubation (DNI)     Code Status (Patient has no pulse and is not breathing):    No CPR (Do Not Attempt to Resuscitate)     Medical Interventions (Patient has pulse or is breathing):    Limited Support       Lin Gomez MD  11/09/23

## 2023-11-10 VITALS
DIASTOLIC BLOOD PRESSURE: 73 MMHG | OXYGEN SATURATION: 93 % | SYSTOLIC BLOOD PRESSURE: 121 MMHG | RESPIRATION RATE: 18 BRPM | WEIGHT: 200 LBS | TEMPERATURE: 97.6 F | HEIGHT: 67 IN | HEART RATE: 51 BPM | BODY MASS INDEX: 31.39 KG/M2

## 2023-11-10 PROBLEM — M62.82 RHABDOMYOLYSIS: Status: RESOLVED | Noted: 2023-11-07 | Resolved: 2023-11-10

## 2023-11-10 PROBLEM — R41.82 ALTERED MENTAL STATUS: Status: RESOLVED | Noted: 2023-11-07 | Resolved: 2023-11-10

## 2023-11-10 LAB
ANION GAP SERPL CALCULATED.3IONS-SCNC: 8 MMOL/L (ref 5–15)
BASOPHILS # BLD AUTO: 0.07 10*3/MM3 (ref 0–0.2)
BASOPHILS NFR BLD AUTO: 1.3 % (ref 0–1.5)
BUN SERPL-MCNC: 9 MG/DL (ref 8–23)
BUN/CREAT SERPL: 14.3 (ref 7–25)
CALCIUM SPEC-SCNC: 8.4 MG/DL (ref 8.6–10.5)
CHLORIDE SERPL-SCNC: 110 MMOL/L (ref 98–107)
CK SERPL-CCNC: 106 U/L (ref 20–200)
CO2 SERPL-SCNC: 24 MMOL/L (ref 22–29)
CREAT SERPL-MCNC: 0.63 MG/DL (ref 0.76–1.27)
DEPRECATED RDW RBC AUTO: 64.6 FL (ref 37–54)
EGFRCR SERPLBLD CKD-EPI 2021: 94.4 ML/MIN/1.73
EOSINOPHIL # BLD AUTO: 0.43 10*3/MM3 (ref 0–0.4)
EOSINOPHIL NFR BLD AUTO: 7.8 % (ref 0.3–6.2)
ERYTHROCYTE [DISTWIDTH] IN BLOOD BY AUTOMATED COUNT: 19.2 % (ref 12.3–15.4)
GLUCOSE SERPL-MCNC: 83 MG/DL (ref 65–99)
HCT VFR BLD AUTO: 38.9 % (ref 37.5–51)
HGB BLD-MCNC: 12.2 G/DL (ref 13–17.7)
IMM GRANULOCYTES # BLD AUTO: 0.01 10*3/MM3 (ref 0–0.05)
IMM GRANULOCYTES NFR BLD AUTO: 0.2 % (ref 0–0.5)
LYMPHOCYTES # BLD AUTO: 1.18 10*3/MM3 (ref 0.7–3.1)
LYMPHOCYTES NFR BLD AUTO: 21.3 % (ref 19.6–45.3)
MCH RBC QN AUTO: 28.9 PG (ref 26.6–33)
MCHC RBC AUTO-ENTMCNC: 31.4 G/DL (ref 31.5–35.7)
MCV RBC AUTO: 92.2 FL (ref 79–97)
MONOCYTES # BLD AUTO: 0.87 10*3/MM3 (ref 0.1–0.9)
MONOCYTES NFR BLD AUTO: 15.7 % (ref 5–12)
NEUTROPHILS NFR BLD AUTO: 2.97 10*3/MM3 (ref 1.7–7)
NEUTROPHILS NFR BLD AUTO: 53.7 % (ref 42.7–76)
NRBC BLD AUTO-RTO: 0 /100 WBC (ref 0–0.2)
PLATELET # BLD AUTO: 237 10*3/MM3 (ref 140–450)
PMV BLD AUTO: 10.7 FL (ref 6–12)
POTASSIUM SERPL-SCNC: 3.6 MMOL/L (ref 3.5–5.2)
RBC # BLD AUTO: 4.22 10*6/MM3 (ref 4.14–5.8)
SODIUM SERPL-SCNC: 142 MMOL/L (ref 136–145)
WBC NRBC COR # BLD: 5.53 10*3/MM3 (ref 3.4–10.8)

## 2023-11-10 PROCEDURE — 85025 COMPLETE CBC W/AUTO DIFF WBC: CPT | Performed by: STUDENT IN AN ORGANIZED HEALTH CARE EDUCATION/TRAINING PROGRAM

## 2023-11-10 PROCEDURE — 80048 BASIC METABOLIC PNL TOTAL CA: CPT | Performed by: STUDENT IN AN ORGANIZED HEALTH CARE EDUCATION/TRAINING PROGRAM

## 2023-11-10 PROCEDURE — 82550 ASSAY OF CK (CPK): CPT | Performed by: STUDENT IN AN ORGANIZED HEALTH CARE EDUCATION/TRAINING PROGRAM

## 2023-11-10 PROCEDURE — 99239 HOSP IP/OBS DSCHRG MGMT >30: CPT | Performed by: STUDENT IN AN ORGANIZED HEALTH CARE EDUCATION/TRAINING PROGRAM

## 2023-11-10 PROCEDURE — 25810000003 SODIUM CHLORIDE 0.9 % SOLUTION: Performed by: HOSPITALIST

## 2023-11-10 RX ORDER — GABAPENTIN 100 MG/1
100 CAPSULE ORAL 3 TIMES DAILY PRN
Qty: 30 CAPSULE | Refills: 0 | Status: SHIPPED | OUTPATIENT
Start: 2023-11-10

## 2023-11-10 RX ORDER — POTASSIUM CHLORIDE 20 MEQ/1
40 TABLET, EXTENDED RELEASE ORAL EVERY 4 HOURS
Status: DISCONTINUED | OUTPATIENT
Start: 2023-11-10 | End: 2023-11-10 | Stop reason: HOSPADM

## 2023-11-10 RX ORDER — CHOLECALCIFEROL (VITAMIN D3) 125 MCG
5 CAPSULE ORAL NIGHTLY PRN
Qty: 30 TABLET | Refills: 0 | Status: SHIPPED | OUTPATIENT
Start: 2023-11-10

## 2023-11-10 RX ORDER — PHENOBARBITAL 32.4 MG/1
64.8 TABLET ORAL NIGHTLY
Status: DISCONTINUED | OUTPATIENT
Start: 2023-11-10 | End: 2023-11-10 | Stop reason: HOSPADM

## 2023-11-10 RX ADMIN — Medication 10 ML: at 09:18

## 2023-11-10 RX ADMIN — SODIUM CHLORIDE 125 ML/HR: 9 INJECTION, SOLUTION INTRAVENOUS at 03:22

## 2023-11-10 RX ADMIN — PRAMIPEXOLE DIHYDROCHLORIDE 1.5 MG: 1 TABLET ORAL at 09:17

## 2023-11-10 RX ADMIN — APIXABAN 5 MG: 5 TABLET, FILM COATED ORAL at 09:17

## 2023-11-10 RX ADMIN — LOSARTAN POTASSIUM 25 MG: 25 TABLET, FILM COATED ORAL at 09:17

## 2023-11-10 RX ADMIN — PANTOPRAZOLE SODIUM 40 MG: 40 TABLET, DELAYED RELEASE ORAL at 05:34

## 2023-11-10 RX ADMIN — SENNOSIDES AND DOCUSATE SODIUM 2 TABLET: 8.6; 5 TABLET ORAL at 09:17

## 2023-11-10 RX ADMIN — FINASTERIDE 5 MG: 5 TABLET, FILM COATED ORAL at 09:17

## 2023-11-10 RX ADMIN — LACOSAMIDE 100 MG: 100 TABLET, FILM COATED ORAL at 09:17

## 2023-11-10 NOTE — DISCHARGE SUMMARY
[facility] Medicine Services  DISCHARGE SUMMARY    Patient Name: Caesar Kim  : 1940  MRN: [id number]    Date of Admission: 2023  1:08 PM  Date of Discharge:  11/10/2023  Primary Care Physician: Provider, No Known    Consults       No orders found from 10/9/2023 to 2023.            Hospital Course     Presenting Problem: Fall and LE pain    Active Hospital Problems    Diagnosis  POA    Fall [W19.XXXA]  Yes    Right hip pain [M25.551]  Yes    Essential hypertension [I10]  Yes    Atrial fibrillation [I48.91]  Yes      Resolved Hospital Problems    Diagnosis Date Resolved POA    **Rhabdomyolysis [M62.82] 11/10/2023 Yes    Altered mental status [R41.82] 11/10/2023 Yes          Hospital Course:  Caesar Kim is a 83 y.o. male  with hx of HTN, HLD, chronic diastolic CHF, COPD, GERD, PE, Afib on Eliquis, BPH, RLS, seizure disorder, and memory loss who presents from home due to a fall with right hip pain along with altered mental status. Patient fell at some point in the morning 23 and laid there for about 24 hours until home health arrived 23 at which time he was brought to the ER. CT head, x-ray right hip, and CT pelvis were all negative for acute findings. Labs showed elevated , otherwise unremarkable. Admitted for further management.     Rhabdomyolysis  Fall  Right hip pain  --CK resolved w IVF, now stopped. Pain is much better with IV fluids. Recommend to hold home diuretics. F/u w PCP     Altered mental status--resolved  Underlying memory loss/mild cognitive impairment  --Presume secondary to dehydration, rhabdomyolysis   --UA with moderate leukocytes and TNTC WBC, no bacteria; did have recent Enterococcus faecalis UTI treated at [facility]  --urine culture growing 25K normal judson  --afebrile with normal WBC will defer ATBx at this time  --at neurologic baseline currently     RLE swelling  --checked duplex-- neg for DVT     Recent COVID-19  --Per [facility]  [place] note from Dr. Govea 10/23/23, patient had one day of fevers followed by modest URI symptoms, was not a candidate for Paxlovid due to interactions with his other medications  --Has been more than two weeks since his diagnosis, out of isolation     HTN  HLD  Chronic diastolic CHF  --Continue home meds but holding Lasix for now given elevated CK. Recommend to cont holding on discharge     Afib  Hx of PE  --Continue Eliquis     GERD  --Continue Pepcid, Protonix     Seizure disorder  --Phenobarbital level WNL  --Follows with Dr. Garcia, on Vimpat and Phenobarbital, will continue       BPH  --Continue Proscar     RLS  --Continue Mirapex  --added gabapentin for possible neuropathy           Discharge Follow Up Recommendations for outpatient labs/diagnostics:   PCP    Day of Discharge     HPI:   Pain of his lower extremities much better today. Eating well, no new overnight issues    Review of Systems  Gen- No fevers, chills  CV- No chest pain, palpitations  Resp- No cough, dyspnea  GI- No N/V/D, abd pain      Vital Signs:   Temp:  [97.5 °F (36.4 °C)-98.2 °F (36.8 °C)] 97.6 °F (36.4 °C)  Heart Rate:  [47-71] 54  Resp:  [18-20] 18  BP: (121-143)/(73-88) 121/73  Flow (L/min):  [2] 2      Physical Exam:  Constitutional: No acute distress, awake, alert, sitting up in chair  HENT: NCAT, mucous membranes moist  Respiratory: Clear to auscultation bilaterally, respiratory effort normal   Cardiovascular: RRR, no murmurs, rubs, or gallops  Gastrointestinal: Positive bowel sounds, soft, nontender, nondistended  Musculoskeletal: No bilateral ankle edema  Psychiatric: Appropriate affect, cooperative  Neurologic: Oriented x 2 (not date), moves all extremities, speech clear  Skin: No rashes    Pertinent  and/or Most Recent Results     LAB RESULTS:      Lab 11/10/23  0333 11/08/23  0548 11/07/23  1427   WBC 5.53 5.24 6.66   HEMOGLOBIN 12.2* 12.5* 14.7   HEMATOCRIT 38.9 39.4 46.7   PLATELETS 237 262 283   NEUTROS ABS 2.97  --  4.21    IMMATURE GRANS (ABS) 0.01  --  0.07*   LYMPHS ABS 1.18  --  1.30   MONOS ABS 0.87  --  0.83   EOS ABS 0.43*  --  0.18   MCV 92.2 90.2 90.9         Lab 11/10/23  0333 11/08/23  0548 11/07/23  1427   SODIUM 142 141 141   POTASSIUM 3.6 4.0 4.2   CHLORIDE 110* 107 104   CO2 24.0 23.0 28.0   ANION GAP 8.0 11.0 9.0   BUN 9 14 13   CREATININE 0.63* 0.72* 0.84   EGFR 94.4 90.7 86.5   GLUCOSE 83 82 124*   CALCIUM 8.4* 8.9 9.5         Lab 11/07/23  1427   TOTAL PROTEIN 7.5   ALBUMIN 3.9   GLOBULIN 3.6   ALT (SGPT) 18   AST (SGOT) 34   BILIRUBIN 0.6   ALK PHOS 62                     Brief Urine Lab Results  (Last result in the past 365 days)        Color   Clarity   Blood   Leuk Est   Nitrite   Protein   CREAT   Urine HCG        11/07/23 1526 Yellow   Clear   Negative   Moderate (2+)   Negative   Negative                 Microbiology Results (last 10 days)       Procedure Component Value - Date/Time    Urine Culture - Urine, Urine, Clean Catch [[id number]] Collected: 11/07/23 1526    Lab Status: Final result Specimen: Urine, Clean Catch Updated: 11/08/23 1151     Urine Culture 25,000 CFU/mL Normal Urogenital Liv    Narrative:      Colonization of the urinary tract without infection is common. Treatment is discouraged unless the patient is symptomatic, pregnant, or undergoing an invasive urologic procedure.            Duplex Venous Lower Extremity - Bilateral CAR    Result Date: 11/9/2023    Normal bilateral lower extremity venous duplex scan.     CT Pelvis Without Contrast    Result Date: 11/7/2023  CT PELVIS WO CONTRAST Date of Exam: 11/7/2023 1:34 PM CST Indication: right hip pain, neg xray. Comparison: None available. Technique: Axial CT images were obtained of the pelvis without contrast administration.  Reconstructed coronal and sagittal images were also obtained. Automated exposure control and iterative construction methods were used. Findings: GASTROINTESTINAL:  Visualized portions are unremarkable. LYMPH NODES:   Unremarkable REPRODUCTIVE: Irregular prostate gland impresses upon the urinary bladder base. BLADDER:  Unremarkable OSSEUS STRUCTURES: No acute fracture identified. There is bilateral mild to moderate hip degenerative arthrosis and degenerative changes of the visualized lumbar spine. There is minimal subcutaneous edema/ecchymosis along the outer margin of the right greater trochanter. No discrete hematoma. There is a bifurcated abdominal aortic stent graft     Impression: 1.No acute osseous injury is identified. Electronically Signed: Uriel Groves MD  11/7/2023 1:48 PM CST  Workstation ID: [id number]    CT Head Without Contrast    Result Date: 11/7/2023  CT HEAD WO CONTRAST Date of Exam: 11/7/2023 1:34 PM CST Indication: fall, eliquis. Comparison: 5/2/2023 Technique: Axial CT images were obtained of the head without contrast administration.  Automated exposure control and iterative construction methods were used. Findings: There is no evidence of acute territorial infarction. There there is no acute intracranial hemorrhage. There are no extra-axial collections. Ventricles and CSF spaces are symmetric. No mass effect nor hydrocephalus. Brain parenchyma appears normal for age with moderate nonspecific white matter changes likely reflective of microvascular ischemic disease..  Paranasal sinuses and mastoid air cells are adequately aerated.  Osseous structures and orbits appear intact.     Impression: No acute intracranial process identified. Electronically Signed: Uriel Groves MD  11/7/2023 1:44 PM CST  Workstation ID: [id number]    XR Hip With or Without Pelvis 2 - 3 View Right    Result Date: 11/7/2023  XR HIP W OR WO PELVIS 2-3 VIEW RIGHT Date of Exam: 11/7/2023 1:27 PM EST Indication: fall, pain Comparison: None available. Findings: The right hip joint is congruent without evidence of acute fracture or traumatic malalignment. Mild degenerative change of the right hip joint. No acute osseous findings in the pelvis.  Grossly unremarkable appearance of the partially valuated left hip. Aortobiiliac stent graft is noted. A couple surgical clips are seen in the lower central pelvis.     Impression: No acute fracture or malalignment. Electronically Signed: Ramu Ocampo MD  11/7/2023 2:25 PM EST  Workstation ID: [id number]     Results for orders placed during the hospital encounter of 11/07/23    Duplex Venous Lower Extremity - Bilateral CAR    Interpretation Summary    Normal bilateral lower extremity venous duplex scan.      Results for orders placed during the hospital encounter of 11/07/23    Duplex Venous Lower Extremity - Bilateral CAR    Interpretation Summary    Normal bilateral lower extremity venous duplex scan.          Plan for Follow-up of Pending Labs/Results: no pending results    Discharge Details        Discharge Medications        New Medications        Instructions Start Date   gabapentin 100 MG capsule  Commonly known as: NEURONTIN   100 mg, Oral, 3 Times Daily PRN      melatonin 5 MG tablet tablet   5 mg, Oral, Nightly PRN             Continue These Medications        Instructions Start Date   acetaminophen 500 MG tablet  Commonly known as: TYLENOL   500 mg, Oral, Every 6 Hours PRN      apixaban 5 MG tablet tablet  Commonly known as: ELIQUIS   5 mg, Oral, 2 Times Daily      docusate sodium 100 MG capsule  Commonly known as: COLACE   100 mg, Oral, 2 Times Daily      docusate sodium 100 MG capsule  Commonly known as: COLACE   100 mg, Oral, 2 Times Daily      ezetimibe 10 MG tablet  Commonly known as: ZETIA   10 mg, Oral, Daily      famotidine 40 MG tablet  Commonly known as: PEPCID   40 mg, Oral, Daily      finasteride 5 MG tablet  Commonly known as: PROSCAR   5 mg, Oral, Daily      Fluticasone Propionate (Inhal) 250 MCG/ACT aerosol powder    1 puff, Inhalation, 2 Times Daily      lacosamide 50 MG tablet tablet  Commonly known as: VIMPAT   100 mg, Oral, Daily      losartan 50 MG tablet  Commonly known as: COZAAR    25 mg, Oral, Daily      omeprazole 40 MG capsule  Commonly known as: priLOSEC   40 mg, Oral, Daily      PHENobarbital 32.4 MG tablet  Commonly known as: LUMINAL   32.4 mg, Oral, Nightly, Takes 2 pille at bedtime      pramipexole 1.5 MG tablet  Commonly known as: MIRAPEX   1.5 mg, Oral, 2 Times Daily      topiramate 100 MG tablet  Commonly known as: TOPAMAX   200 mg, Oral, Daily, Patient takes 2 pills       trospium 20 MG tablet  Commonly known as: SANCTURA   20 mg, Oral, 2 Times Daily             Stop These Medications      amoxicillin-clavulanate 500-125 MG per tablet  Commonly known as: AUGMENTIN     furosemide 20 MG tablet  Commonly known as: LASIX     KLOR-CON M20 PO              Allergies   Allergen Reactions    Acyclovir Other (See Comments) and GI Intolerance     Other reaction(s): Other, Other: See Comments      Atorvastatin Myalgia     Other reaction(s): Myalgia, Myalgias (Muscle Pain)      Fenofibrate Myalgia     Other reaction(s): Myalgias (Muscle Pain)  Other reaction(s): Myalgia      Metformin Unknown - High Severity     Other reaction(s): Unknown      Mirabegron Unknown - High Severity     Other reaction(s): Unknown           Discharge Disposition:  Rehab Facility or Unit (DC - External)    Diet:  Hospital:  Diet Order   Procedures    Diet: Cardiac Diets; Healthy Heart (2-3 Na+); Texture: Regular Texture (IDDSI 7); Fluid Consistency: Thin (IDDSI 0)            Activity:  as tolerated    Restrictions or Other Recommendations:  none       CODE STATUS:    Code Status and Medical Interventions:   Ordered at: 11/07/23 1553     Medical Intervention Limits:    NO intubation (DNI)     Code Status (Patient has no pulse and is not breathing):    No CPR (Do Not Attempt to Resuscitate)     Medical Interventions (Patient has pulse or is breathing):    Limited Support       No future appointments.              Lin Gomez MD  11/10/23      Time Spent on Discharge:  I spent  45  minutes on this discharge activity  which included: face-to-face encounter with the patient, reviewing the data in the system, coordination of the care with the nursing staff as well as consultants, documentation, and entering orders.

## 2023-11-10 NOTE — CASE MANAGEMENT/SOCIAL WORK
Case Management Discharge Note      Final Note: Spoke with Annie at [facility].  They have offered a bed today on CVA2.  RN to call report to [phone] and fax DC summary to [phone].  [facility] van to transport.  Pt to be at the Maternity Entrance of the [facility] today at 1415.  Wife updated/agreeable via phone.         Selected Continued Care - Admitted Since 11/7/2023       Destination Coordination complete.      Service Provider Selected Services Address Phone Fax Patient Preferred    [facility] [address] [id number] [phone] --              Durable Medical Equipment    No services have been selected for the patient.                Dialysis/Infusion    No services have been selected for the patient.                Home Medical Care    No services have been selected for the patient.                Therapy    No services have been selected for the patient.                Community Resources    No services have been selected for the patient.                Community & DME    No services have been selected for the patient.                    Transportation Services  W/C Van:  ([facility])    Final Discharge Disposition Code: 62 - inpatient rehab facility

## 2023-11-22 ENCOUNTER — APPOINTMENT (OUTPATIENT)
Dept: CT IMAGING | Facility: HOSPITAL | Age: 83
End: 2023-11-22
Payer: MEDICARE

## 2023-11-22 ENCOUNTER — HOSPITAL ENCOUNTER (EMERGENCY)
Facility: HOSPITAL | Age: 83
Discharge: NURSING, REHAB OR LONG-TERM CARE | End: 2023-11-22
Attending: EMERGENCY MEDICINE | Admitting: EMERGENCY MEDICINE
Payer: MEDICARE

## 2023-11-22 VITALS
HEART RATE: 58 BPM | RESPIRATION RATE: 18 BRPM | WEIGHT: 215 LBS | OXYGEN SATURATION: 97 % | SYSTOLIC BLOOD PRESSURE: 98 MMHG | HEIGHT: 67 IN | BODY MASS INDEX: 33.74 KG/M2 | TEMPERATURE: 97.7 F | DIASTOLIC BLOOD PRESSURE: 65 MMHG
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
## 2023-11-22 DIAGNOSIS — Z13.9 ENCOUNTER FOR MEDICAL SCREENING EXAMINATION: ICD-10-CM

## 2023-11-22 PROCEDURE — 99284 EMERGENCY DEPT VISIT MOD MDM: CPT

## 2023-11-22 PROCEDURE — 70450 CT HEAD/BRAIN W/O DYE: CPT

## 2023-11-22 NOTE — ED PROVIDER NOTES
Subjective   History of Present Illness  83-year-old male who presents for evaluation after a fall.  The patient currently is at [facility] due to muscle weakness.  He reportedly was turning and he fell and per his report he did hit his head.  There is no apparent signs of injury over his head.  He apparently takes Eliquis.  He is awake and alert and at his baseline mentation.  He has no pain in the neck or midline back.  No chest or abdominal pain.  No pain to the bilateral upper or lower extremities.  No other acute complaints.      Review of Systems   Constitutional:  Negative for chills, fatigue and fever.   HENT:  Negative for congestion, ear pain, postnasal drip, sinus pressure and sore throat.    Eyes:  Negative for pain, redness and visual disturbance.   Respiratory:  Negative for cough, chest tightness and shortness of breath.    Cardiovascular:  Negative for chest pain, palpitations and leg swelling.   Gastrointestinal:  Negative for abdominal pain, anal bleeding, blood in stool, diarrhea, nausea and vomiting.   Endocrine: Negative for polydipsia and polyuria.   Genitourinary:  Negative for difficulty urinating, dysuria, frequency and urgency.   Musculoskeletal:  Negative for arthralgias, back pain and neck pain.   Skin:  Negative for pallor and rash.   Allergic/Immunologic: Negative for environmental allergies and immunocompromised state.   Neurological:  Positive for headaches. Negative for dizziness and weakness.   Hematological:  Negative for adenopathy.   Psychiatric/Behavioral:  Negative for confusion, self-injury and suicidal ideas. The patient is not nervous/anxious.    All other systems reviewed and are negative.      Past Medical History:   Diagnosis Date    AAA (abdominal aortic aneurysm)     Arthritis     Atrial fibrillation     BPH (benign prostatic hyperplasia)     CHF (congestive heart failure)     COPD (chronic obstructive pulmonary disease)     Diabetes mellitus     Elevated cholesterol      GERD (gastroesophageal reflux disease)     Hypertension     Memory loss     Personal history of pulmonary embolism     Pulmonary embolism     Restless leg syndrome     Sleep apnea     Uses CPAP    Unspecified convulsions     Unspecified diastolic (congestive) heart failure     Urinary tract infection        Allergies   Allergen Reactions    Acyclovir Other (See Comments) and GI Intolerance     Other reaction(s): Other, Other: See Comments      Atorvastatin Myalgia     Other reaction(s): Myalgia, Myalgias (Muscle Pain)      Fenofibrate Myalgia     Other reaction(s): Myalgias (Muscle Pain)  Other reaction(s): Myalgia      Metformin Unknown - High Severity     Other reaction(s): Unknown      Mirabegron Unknown - High Severity     Other reaction(s): Unknown         Past Surgical History:   Procedure Laterality Date    CHOLECYSTECTOMY      COLONOSCOPY      CYSTOSCOPY TRANSURETHRAL RESECTION OF PROSTATE N/A 2022    Procedure: CYSTOSCOPY TRANSURETHRAL RESECTION OF PROSTATE GREENLIGHT;  Surgeon: Darius Costa MD;  Location: [facility];  Service: Urology;  Laterality: N/A;    ENDOSCOPY      EYE SURGERY Bilateral     CATARACTS    SHOULDER SURGERY Left     SKIN BIOPSY      NECK    TONSILLECTOMY      AND ADENOIDS       Family History   Problem Relation Age of Onset    Heart attack Other     Tuberculosis Other        Social History     Socioeconomic History    Marital status:    Tobacco Use    Smoking status: Former     Types: Cigarettes     Quit date:      Years since quittin.9    Smokeless tobacco: Former     Quit date:    Substance and Sexual Activity    Alcohol use: Never    Drug use: Not Currently    Sexual activity: Defer           Objective   Physical Exam  Vitals and nursing note reviewed.   Constitutional:       General: He is not in acute distress.     Appearance: Normal appearance. He is well-developed. He is not toxic-appearing or diaphoretic.   HENT:      Head: Normocephalic and  atraumatic.      Right Ear: External ear normal.      Left Ear: External ear normal.      Nose: Nose normal.   Eyes:      General: Lids are normal.      Pupils: Pupils are equal, round, and reactive to light.   Neck:      Trachea: No tracheal deviation.   Cardiovascular:      Rate and Rhythm: Normal rate and regular rhythm.      Pulses: No decreased pulses.      Heart sounds: Normal heart sounds. No murmur heard.     No friction rub. No gallop.   Pulmonary:      Effort: Pulmonary effort is normal. No respiratory distress.      Breath sounds: Normal breath sounds. No decreased breath sounds, wheezing, rhonchi or rales.   Abdominal:      General: Bowel sounds are normal.      Palpations: Abdomen is soft.      Tenderness: There is no abdominal tenderness. There is no guarding or rebound.   Musculoskeletal:         General: No deformity. Normal range of motion.      Cervical back: Normal range of motion and neck supple.   Lymphadenopathy:      Cervical: No cervical adenopathy.   Skin:     General: Skin is warm and dry.      Findings: No rash.   Neurological:      Mental Status: He is alert and oriented to person, place, and time.      Cranial Nerves: No cranial nerve deficit.      Sensory: No sensory deficit.   Psychiatric:         Speech: Speech normal.         Behavior: Behavior normal.         Thought Content: Thought content normal.         Judgment: Judgment normal.         Procedures           ED Course                                             Medical Decision Making  Differential diagnosis includes scalp contusion, scalp laceration, intracranial hemorrhage, other musculoskeletal injury, other unspecified etiology.    No obvious signs of injury throughout the musculoskeletal system on exam.    CT scan of the head without contrast shows no acute abnormalities.    The patient has remained stable without any acute complaints while here in the ER.    Discharged home appearing well.     Problems  Addressed:  Encounter for medical screening examination: complicated acute illness or injury with systemic symptoms  Fall, initial encounter: complicated acute illness or injury with systemic symptoms    Amount and/or Complexity of Data Reviewed  Independent Historian: spouse and EMS  Radiology: ordered and independent interpretation performed. Decision-making details documented in ED Course.        Final diagnoses:   Fall, initial encounter   Encounter for medical screening examination       ED Disposition  ED Disposition       ED Disposition   Discharge    Condition   Stable    Comment   --               No follow-up provider specified.       Medication List      No changes were made to your prescriptions during this visit.            Keyla Espinosa MD  11/22/23 [id number]

## 2023-12-26 ENCOUNTER — TRANSCRIBE ORDERS (OUTPATIENT)
Dept: ADMINISTRATIVE | Facility: HOSPITAL | Age: 83
End: 2023-12-26
Payer: MEDICARE

## 2023-12-26 DIAGNOSIS — I71.40 ABDOMINAL AORTIC ANEURYSM (AAA) WITHOUT RUPTURE, UNSPECIFIED PART: Primary | ICD-10-CM

## 2024-01-08 ENCOUNTER — TRANSCRIBE ORDERS (OUTPATIENT)
Dept: ADMINISTRATIVE | Facility: HOSPITAL | Age: 84
End: 2024-01-08
Payer: MEDICARE

## 2024-01-14 ENCOUNTER — APPOINTMENT (OUTPATIENT)
Dept: GENERAL RADIOLOGY | Facility: HOSPITAL | Age: 84
End: 2024-01-14
Payer: MEDICARE

## 2024-01-14 ENCOUNTER — APPOINTMENT (OUTPATIENT)
Dept: CT IMAGING | Facility: HOSPITAL | Age: 84
End: 2024-01-14
Payer: MEDICARE

## 2024-01-14 ENCOUNTER — HOSPITAL ENCOUNTER (INPATIENT)
Facility: HOSPITAL | Age: 84
LOS: 4 days | Discharge: NURSING, REHAB OR LONG-TERM CARE | End: 2024-01-19
Attending: STUDENT IN AN ORGANIZED HEALTH CARE EDUCATION/TRAINING PROGRAM | Admitting: FAMILY MEDICINE
Payer: MEDICARE

## 2024-01-14 DIAGNOSIS — N39.0 ACUTE UTI (URINARY TRACT INFECTION): ICD-10-CM

## 2024-01-14 DIAGNOSIS — R41.82 ALTERED MENTAL STATUS, UNSPECIFIED ALTERED MENTAL STATUS TYPE: Primary | ICD-10-CM

## 2024-01-14 DIAGNOSIS — R74.8 ELEVATED CK: ICD-10-CM

## 2024-01-14 DIAGNOSIS — G93.40 ENCEPHALOPATHY, UNSPECIFIED: ICD-10-CM

## 2024-01-14 DIAGNOSIS — I10 ESSENTIAL HYPERTENSION: ICD-10-CM

## 2024-01-14 LAB
ALBUMIN SERPL-MCNC: 4.2 G/DL (ref 3.5–5.2)
ALBUMIN/GLOB SERPL: 1.1 G/DL
ALP SERPL-CCNC: 53 U/L (ref 39–117)
ALT SERPL W P-5'-P-CCNC: 16 U/L (ref 1–41)
AMPHET+METHAMPHET UR QL: NEGATIVE
AMPHETAMINES UR QL: NEGATIVE
ANION GAP SERPL CALCULATED.3IONS-SCNC: 11 MMOL/L (ref 5–15)
APAP SERPL-MCNC: <5 MCG/ML (ref 0–30)
AST SERPL-CCNC: 27 U/L (ref 1–40)
B PARAPERT DNA SPEC QL NAA+PROBE: NOT DETECTED
B PERT DNA SPEC QL NAA+PROBE: NOT DETECTED
BARBITURATES UR QL SCN: POSITIVE
BASOPHILS # BLD AUTO: 0.08 10*3/MM3 (ref 0–0.2)
BASOPHILS NFR BLD AUTO: 1.2 % (ref 0–1.5)
BENZODIAZ UR QL SCN: NEGATIVE
BILIRUB SERPL-MCNC: 0.7 MG/DL (ref 0–1.2)
BILIRUB UR QL STRIP: NEGATIVE
BUN SERPL-MCNC: 23 MG/DL (ref 8–23)
BUN/CREAT SERPL: 20.4 (ref 7–25)
BUPRENORPHINE SERPL-MCNC: NEGATIVE NG/ML
C PNEUM DNA NPH QL NAA+NON-PROBE: NOT DETECTED
CALCIUM SPEC-SCNC: 9.6 MG/DL (ref 8.6–10.5)
CANNABINOIDS SERPL QL: NEGATIVE
CHLORIDE SERPL-SCNC: 104 MMOL/L (ref 98–107)
CK SERPL-CCNC: 447 U/L (ref 20–200)
CLARITY UR: ABNORMAL
CO2 SERPL-SCNC: 25 MMOL/L (ref 22–29)
COCAINE UR QL: NEGATIVE
COLOR UR: YELLOW
CREAT SERPL-MCNC: 1.13 MG/DL (ref 0.76–1.27)
CRP SERPL-MCNC: 1.13 MG/DL (ref 0–0.5)
D-LACTATE SERPL-SCNC: 1.4 MMOL/L (ref 0.5–2)
DEPRECATED RDW RBC AUTO: 53.6 FL (ref 37–54)
EGFRCR SERPLBLD CKD-EPI 2021: 64.5 ML/MIN/1.73
EOSINOPHIL # BLD AUTO: 0.06 10*3/MM3 (ref 0–0.4)
EOSINOPHIL NFR BLD AUTO: 0.9 % (ref 0.3–6.2)
ERYTHROCYTE [DISTWIDTH] IN BLOOD BY AUTOMATED COUNT: 15.1 % (ref 12.3–15.4)
ETHANOL BLD-MCNC: <10 MG/DL (ref 0–10)
FENTANYL UR-MCNC: NEGATIVE NG/ML
FLUAV SUBTYP SPEC NAA+PROBE: NOT DETECTED
FLUBV RNA ISLT QL NAA+PROBE: NOT DETECTED
GLOBULIN UR ELPH-MCNC: 3.7 GM/DL
GLUCOSE SERPL-MCNC: 99 MG/DL (ref 65–99)
GLUCOSE UR STRIP-MCNC: NEGATIVE MG/DL
HADV DNA SPEC NAA+PROBE: NOT DETECTED
HCOV 229E RNA SPEC QL NAA+PROBE: NOT DETECTED
HCOV HKU1 RNA SPEC QL NAA+PROBE: NOT DETECTED
HCOV NL63 RNA SPEC QL NAA+PROBE: NOT DETECTED
HCOV OC43 RNA SPEC QL NAA+PROBE: NOT DETECTED
HCT VFR BLD AUTO: 46.1 % (ref 37.5–51)
HGB BLD-MCNC: 15 G/DL (ref 13–17.7)
HGB UR QL STRIP.AUTO: ABNORMAL
HMPV RNA NPH QL NAA+NON-PROBE: NOT DETECTED
HOLD SPECIMEN: NORMAL
HOLD SPECIMEN: NORMAL
HPIV1 RNA ISLT QL NAA+PROBE: NOT DETECTED
HPIV2 RNA SPEC QL NAA+PROBE: NOT DETECTED
HPIV3 RNA NPH QL NAA+PROBE: NOT DETECTED
HPIV4 P GENE NPH QL NAA+PROBE: NOT DETECTED
IMM GRANULOCYTES # BLD AUTO: 0.02 10*3/MM3 (ref 0–0.05)
IMM GRANULOCYTES NFR BLD AUTO: 0.3 % (ref 0–0.5)
KETONES UR QL STRIP: NEGATIVE
LEUKOCYTE ESTERASE UR QL STRIP.AUTO: ABNORMAL
LIPASE SERPL-CCNC: 38 U/L (ref 13–60)
LYMPHOCYTES # BLD AUTO: 1.04 10*3/MM3 (ref 0.7–3.1)
LYMPHOCYTES NFR BLD AUTO: 15.4 % (ref 19.6–45.3)
M PNEUMO IGG SER IA-ACNC: NOT DETECTED
MAGNESIUM SERPL-MCNC: 2.4 MG/DL (ref 1.6–2.4)
MCH RBC QN AUTO: 31.1 PG (ref 26.6–33)
MCHC RBC AUTO-ENTMCNC: 32.5 G/DL (ref 31.5–35.7)
MCV RBC AUTO: 95.6 FL (ref 79–97)
METHADONE UR QL SCN: NEGATIVE
MONOCYTES # BLD AUTO: 0.85 10*3/MM3 (ref 0.1–0.9)
MONOCYTES NFR BLD AUTO: 12.6 % (ref 5–12)
NEUTROPHILS NFR BLD AUTO: 4.69 10*3/MM3 (ref 1.7–7)
NEUTROPHILS NFR BLD AUTO: 69.6 % (ref 42.7–76)
NITRITE UR QL STRIP: NEGATIVE
NRBC BLD AUTO-RTO: 0 /100 WBC (ref 0–0.2)
OPIATES UR QL: NEGATIVE
OXYCODONE UR QL SCN: NEGATIVE
PCP UR QL SCN: NEGATIVE
PH UR STRIP.AUTO: <=5 [PH] (ref 5–8)
PHENOBARB SERPL-MCNC: 4.6 MCG/ML (ref 10–30)
PHOSPHATE SERPL-MCNC: 4.2 MG/DL (ref 2.5–4.5)
PLATELET # BLD AUTO: 224 10*3/MM3 (ref 140–450)
PMV BLD AUTO: 10.3 FL (ref 6–12)
POTASSIUM SERPL-SCNC: 4.2 MMOL/L (ref 3.5–5.2)
PROCALCITONIN SERPL-MCNC: 0.15 NG/ML (ref 0–0.25)
PROT SERPL-MCNC: 7.9 G/DL (ref 6–8.5)
PROT UR QL STRIP: ABNORMAL
RBC # BLD AUTO: 4.82 10*6/MM3 (ref 4.14–5.8)
RHINOVIRUS RNA SPEC NAA+PROBE: NOT DETECTED
RSV RNA NPH QL NAA+NON-PROBE: NOT DETECTED
SALICYLATES SERPL-MCNC: <0.3 MG/DL
SARS-COV-2 RNA NPH QL NAA+NON-PROBE: NOT DETECTED
SODIUM SERPL-SCNC: 140 MMOL/L (ref 136–145)
SP GR UR STRIP: 1.02 (ref 1–1.03)
T4 FREE SERPL-MCNC: 1.08 NG/DL (ref 0.93–1.7)
TRICYCLICS UR QL SCN: NEGATIVE
TROPONIN T SERPL HS-MCNC: 30 NG/L
TSH SERPL DL<=0.05 MIU/L-ACNC: 5 UIU/ML (ref 0.27–4.2)
UROBILINOGEN UR QL STRIP: ABNORMAL
WBC NRBC COR # BLD AUTO: 6.74 10*3/MM3 (ref 3.4–10.8)
WHOLE BLOOD HOLD COAG: NORMAL
WHOLE BLOOD HOLD SPECIMEN: NORMAL

## 2024-01-14 PROCEDURE — 93005 ELECTROCARDIOGRAM TRACING: CPT | Performed by: STUDENT IN AN ORGANIZED HEALTH CARE EDUCATION/TRAINING PROGRAM

## 2024-01-14 PROCEDURE — 85025 COMPLETE CBC W/AUTO DIFF WBC: CPT | Performed by: STUDENT IN AN ORGANIZED HEALTH CARE EDUCATION/TRAINING PROGRAM

## 2024-01-14 PROCEDURE — 80307 DRUG TEST PRSMV CHEM ANLYZR: CPT | Performed by: STUDENT IN AN ORGANIZED HEALTH CARE EDUCATION/TRAINING PROGRAM

## 2024-01-14 PROCEDURE — 80179 DRUG ASSAY SALICYLATE: CPT | Performed by: STUDENT IN AN ORGANIZED HEALTH CARE EDUCATION/TRAINING PROGRAM

## 2024-01-14 PROCEDURE — 36415 COLL VENOUS BLD VENIPUNCTURE: CPT

## 2024-01-14 PROCEDURE — 84145 PROCALCITONIN (PCT): CPT | Performed by: STUDENT IN AN ORGANIZED HEALTH CARE EDUCATION/TRAINING PROGRAM

## 2024-01-14 PROCEDURE — 80143 DRUG ASSAY ACETAMINOPHEN: CPT | Performed by: STUDENT IN AN ORGANIZED HEALTH CARE EDUCATION/TRAINING PROGRAM

## 2024-01-14 PROCEDURE — 87086 URINE CULTURE/COLONY COUNT: CPT | Performed by: STUDENT IN AN ORGANIZED HEALTH CARE EDUCATION/TRAINING PROGRAM

## 2024-01-14 PROCEDURE — 70450 CT HEAD/BRAIN W/O DYE: CPT

## 2024-01-14 PROCEDURE — 83690 ASSAY OF LIPASE: CPT | Performed by: STUDENT IN AN ORGANIZED HEALTH CARE EDUCATION/TRAINING PROGRAM

## 2024-01-14 PROCEDURE — 0202U NFCT DS 22 TRGT SARS-COV-2: CPT | Performed by: STUDENT IN AN ORGANIZED HEALTH CARE EDUCATION/TRAINING PROGRAM

## 2024-01-14 PROCEDURE — 83605 ASSAY OF LACTIC ACID: CPT | Performed by: STUDENT IN AN ORGANIZED HEALTH CARE EDUCATION/TRAINING PROGRAM

## 2024-01-14 PROCEDURE — 87040 BLOOD CULTURE FOR BACTERIA: CPT | Performed by: STUDENT IN AN ORGANIZED HEALTH CARE EDUCATION/TRAINING PROGRAM

## 2024-01-14 PROCEDURE — 80184 ASSAY OF PHENOBARBITAL: CPT | Performed by: STUDENT IN AN ORGANIZED HEALTH CARE EDUCATION/TRAINING PROGRAM

## 2024-01-14 PROCEDURE — 84484 ASSAY OF TROPONIN QUANT: CPT | Performed by: STUDENT IN AN ORGANIZED HEALTH CARE EDUCATION/TRAINING PROGRAM

## 2024-01-14 PROCEDURE — 82077 ASSAY SPEC XCP UR&BREATH IA: CPT | Performed by: STUDENT IN AN ORGANIZED HEALTH CARE EDUCATION/TRAINING PROGRAM

## 2024-01-14 PROCEDURE — 99285 EMERGENCY DEPT VISIT HI MDM: CPT

## 2024-01-14 PROCEDURE — 84439 ASSAY OF FREE THYROXINE: CPT | Performed by: STUDENT IN AN ORGANIZED HEALTH CARE EDUCATION/TRAINING PROGRAM

## 2024-01-14 PROCEDURE — 71045 X-RAY EXAM CHEST 1 VIEW: CPT

## 2024-01-14 PROCEDURE — 84443 ASSAY THYROID STIM HORMONE: CPT | Performed by: STUDENT IN AN ORGANIZED HEALTH CARE EDUCATION/TRAINING PROGRAM

## 2024-01-14 PROCEDURE — 84100 ASSAY OF PHOSPHORUS: CPT | Performed by: STUDENT IN AN ORGANIZED HEALTH CARE EDUCATION/TRAINING PROGRAM

## 2024-01-14 PROCEDURE — 93005 ELECTROCARDIOGRAM TRACING: CPT

## 2024-01-14 PROCEDURE — 86140 C-REACTIVE PROTEIN: CPT | Performed by: STUDENT IN AN ORGANIZED HEALTH CARE EDUCATION/TRAINING PROGRAM

## 2024-01-14 PROCEDURE — 83735 ASSAY OF MAGNESIUM: CPT | Performed by: STUDENT IN AN ORGANIZED HEALTH CARE EDUCATION/TRAINING PROGRAM

## 2024-01-14 PROCEDURE — 81001 URINALYSIS AUTO W/SCOPE: CPT | Performed by: STUDENT IN AN ORGANIZED HEALTH CARE EDUCATION/TRAINING PROGRAM

## 2024-01-14 PROCEDURE — 82550 ASSAY OF CK (CPK): CPT | Performed by: STUDENT IN AN ORGANIZED HEALTH CARE EDUCATION/TRAINING PROGRAM

## 2024-01-14 PROCEDURE — 25810000003 SODIUM CHLORIDE 0.9 % SOLUTION: Performed by: STUDENT IN AN ORGANIZED HEALTH CARE EDUCATION/TRAINING PROGRAM

## 2024-01-14 PROCEDURE — 80053 COMPREHEN METABOLIC PANEL: CPT | Performed by: STUDENT IN AN ORGANIZED HEALTH CARE EDUCATION/TRAINING PROGRAM

## 2024-01-14 RX ORDER — SODIUM CHLORIDE, SODIUM LACTATE, POTASSIUM CHLORIDE, CALCIUM CHLORIDE 600; 310; 30; 20 MG/100ML; MG/100ML; MG/100ML; MG/100ML
125 INJECTION, SOLUTION INTRAVENOUS CONTINUOUS
Status: DISCONTINUED | OUTPATIENT
Start: 2024-01-14 | End: 2024-01-16

## 2024-01-14 RX ORDER — SODIUM CHLORIDE 0.9 % (FLUSH) 0.9 %
10 SYRINGE (ML) INJECTION AS NEEDED
Status: DISCONTINUED | OUTPATIENT
Start: 2024-01-14 | End: 2024-01-15

## 2024-01-14 RX ORDER — ERYTHROMYCIN 5 MG/G
1 OINTMENT OPHTHALMIC ONCE
Status: COMPLETED | OUTPATIENT
Start: 2024-01-14 | End: 2024-01-14

## 2024-01-14 RX ADMIN — SODIUM CHLORIDE 1000 ML: 9 INJECTION, SOLUTION INTRAVENOUS at 22:37

## 2024-01-14 RX ADMIN — ERYTHROMYCIN 1 APPLICATION: 5 OINTMENT OPHTHALMIC at 22:37

## 2024-01-14 NOTE — Clinical Note
Level of Care: Med/Surg [1]   Diagnosis: Complicated UTI (urinary tract infection) [687152]   Admitting Physician: ALISSON GARVEY [[id number]]

## 2024-01-14 NOTE — LETTER
EMS Transport Request  For use at [facility], [place], [place], and [place] only   Patient Name: Caesar Kim : 1940   Weight:89.9 kg (198 lb 3.2 oz) Pick-up Location: [facility] [place] BLS/ALS: BLS/ALS: BLS   Insurance: MEDICARE Auth End Date: NA   Pre-Cert #: D/C Summary complete:    Destination: [facility] (med unit)   Contact Precautions: None   Equipment (O2, Fluids, etc.): None   Arrive By Date/Time: 24 Stretcher/WC: Stretcher   CM Requesting: ISAC Ureña Ext: [phone]   Notes/Medical Necessity: complicated UTI. HLD. Seizures. HTN. Afib, confusion/high fall risk     ______________________________________________________________________    *Only 2 patient bags OR 1 carry-on size bag are permitted.  Wheelchairs and walkers CANNOT transported with the patient. Acknowledge: Yes     She has no active CP, significant valvular disease, arrhythmia or signs/symptoms of heart failure. No additional cardiac work-up at this time. She may proceed with endoscopy.   Outpatient followup of AKOSUA Lofton [phone] appreciate GI followup  Can see us in the office in 6 months for a repeat US aorta    Kirsty [phone]

## 2024-01-15 ENCOUNTER — APPOINTMENT (OUTPATIENT)
Dept: NEUROLOGY | Facility: HOSPITAL | Age: 84
End: 2024-01-15
Payer: MEDICARE

## 2024-01-15 ENCOUNTER — APPOINTMENT (OUTPATIENT)
Dept: MRI IMAGING | Facility: HOSPITAL | Age: 84
End: 2024-01-15
Payer: MEDICARE

## 2024-01-15 ENCOUNTER — APPOINTMENT (OUTPATIENT)
Dept: GENERAL RADIOLOGY | Facility: HOSPITAL | Age: 84
End: 2024-01-15
Payer: MEDICARE

## 2024-01-15 PROBLEM — N40.0 BPH (BENIGN PROSTATIC HYPERPLASIA): Status: ACTIVE | Noted: 2024-01-15

## 2024-01-15 PROBLEM — R56.9 SEIZURE: Status: ACTIVE | Noted: 2024-01-15

## 2024-01-15 PROBLEM — N39.0 COMPLICATED UTI (URINARY TRACT INFECTION): Status: ACTIVE | Noted: 2024-01-15

## 2024-01-15 PROBLEM — K21.9 GERD WITHOUT ESOPHAGITIS: Status: ACTIVE | Noted: 2024-01-15

## 2024-01-15 PROBLEM — E78.5 HLD (HYPERLIPIDEMIA): Status: ACTIVE | Noted: 2024-01-15

## 2024-01-15 LAB
ANION GAP SERPL CALCULATED.3IONS-SCNC: 9 MMOL/L (ref 5–15)
BACTERIA UR QL AUTO: ABNORMAL /HPF
BASOPHILS # BLD AUTO: 0.06 10*3/MM3 (ref 0–0.2)
BASOPHILS NFR BLD AUTO: 1.2 % (ref 0–1.5)
BUN SERPL-MCNC: 22 MG/DL (ref 8–23)
BUN/CREAT SERPL: 19.8 (ref 7–25)
CALCIUM SPEC-SCNC: 8.9 MG/DL (ref 8.6–10.5)
CHLORIDE SERPL-SCNC: 108 MMOL/L (ref 98–107)
CK SERPL-CCNC: 368 U/L (ref 20–200)
CO2 SERPL-SCNC: 24 MMOL/L (ref 22–29)
COD CRY URNS QL: ABNORMAL /HPF
CREAT SERPL-MCNC: 1.11 MG/DL (ref 0.76–1.27)
DEPRECATED RDW RBC AUTO: 54 FL (ref 37–54)
EGFRCR SERPLBLD CKD-EPI 2021: 65.9 ML/MIN/1.73
EOSINOPHIL # BLD AUTO: 0.16 10*3/MM3 (ref 0–0.4)
EOSINOPHIL NFR BLD AUTO: 3.2 % (ref 0.3–6.2)
ERYTHROCYTE [DISTWIDTH] IN BLOOD BY AUTOMATED COUNT: 15.1 % (ref 12.3–15.4)
GEN 5 2HR TROPONIN T REFLEX: 30 NG/L
GLUCOSE SERPL-MCNC: 82 MG/DL (ref 65–99)
HCT VFR BLD AUTO: 39.5 % (ref 37.5–51)
HGB BLD-MCNC: 12.7 G/DL (ref 13–17.7)
HOLD SPECIMEN: NORMAL
HYALINE CASTS UR QL AUTO: ABNORMAL /LPF
IMM GRANULOCYTES # BLD AUTO: 0.01 10*3/MM3 (ref 0–0.05)
IMM GRANULOCYTES NFR BLD AUTO: 0.2 % (ref 0–0.5)
LYMPHOCYTES # BLD AUTO: 1.22 10*3/MM3 (ref 0.7–3.1)
LYMPHOCYTES NFR BLD AUTO: 24.2 % (ref 19.6–45.3)
MCH RBC QN AUTO: 31 PG (ref 26.6–33)
MCHC RBC AUTO-ENTMCNC: 32.2 G/DL (ref 31.5–35.7)
MCV RBC AUTO: 96.3 FL (ref 79–97)
MONOCYTES # BLD AUTO: 0.95 10*3/MM3 (ref 0.1–0.9)
MONOCYTES NFR BLD AUTO: 18.8 % (ref 5–12)
NEUTROPHILS NFR BLD AUTO: 2.65 10*3/MM3 (ref 1.7–7)
NEUTROPHILS NFR BLD AUTO: 52.4 % (ref 42.7–76)
NRBC BLD AUTO-RTO: 0 /100 WBC (ref 0–0.2)
PLATELET # BLD AUTO: 183 10*3/MM3 (ref 140–450)
PMV BLD AUTO: 10.2 FL (ref 6–12)
POTASSIUM SERPL-SCNC: 3.8 MMOL/L (ref 3.5–5.2)
RBC # BLD AUTO: 4.1 10*6/MM3 (ref 4.14–5.8)
RBC # UR STRIP: ABNORMAL /HPF
REF LAB TEST METHOD: ABNORMAL
SODIUM SERPL-SCNC: 141 MMOL/L (ref 136–145)
SQUAMOUS #/AREA URNS HPF: ABNORMAL /HPF
TROPONIN T DELTA: -2 NG/L
TROPONIN T SERPL HS-MCNC: 32 NG/L
WBC # UR STRIP: ABNORMAL /HPF
WBC NRBC COR # BLD AUTO: 5.05 10*3/MM3 (ref 3.4–10.8)
YEAST URNS QL MICRO: ABNORMAL /HPF

## 2024-01-15 PROCEDURE — 95819 EEG AWAKE AND ASLEEP: CPT | Performed by: PSYCHIATRY & NEUROLOGY

## 2024-01-15 PROCEDURE — 97530 THERAPEUTIC ACTIVITIES: CPT

## 2024-01-15 PROCEDURE — 70551 MRI BRAIN STEM W/O DYE: CPT

## 2024-01-15 PROCEDURE — 95819 EEG AWAKE AND ASLEEP: CPT

## 2024-01-15 PROCEDURE — 25810000003 LACTATED RINGERS PER 1000 ML: Performed by: NURSE PRACTITIONER

## 2024-01-15 PROCEDURE — 97162 PT EVAL MOD COMPLEX 30 MIN: CPT

## 2024-01-15 PROCEDURE — 25010000002 PIPERACILLIN SOD-TAZOBACTAM PER 1 G: Performed by: NURSE PRACTITIONER

## 2024-01-15 PROCEDURE — 74018 RADEX ABDOMEN 1 VIEW: CPT

## 2024-01-15 PROCEDURE — 84484 ASSAY OF TROPONIN QUANT: CPT | Performed by: NURSE PRACTITIONER

## 2024-01-15 PROCEDURE — 25010000002 DIAZEPAM PER 5 MG: Performed by: STUDENT IN AN ORGANIZED HEALTH CARE EDUCATION/TRAINING PROGRAM

## 2024-01-15 PROCEDURE — 25810000003 LACTATED RINGERS PER 1000 ML: Performed by: STUDENT IN AN ORGANIZED HEALTH CARE EDUCATION/TRAINING PROGRAM

## 2024-01-15 PROCEDURE — 99223 1ST HOSP IP/OBS HIGH 75: CPT

## 2024-01-15 PROCEDURE — 82550 ASSAY OF CK (CPK): CPT | Performed by: NURSE PRACTITIONER

## 2024-01-15 PROCEDURE — 94660 CPAP INITIATION&MGMT: CPT

## 2024-01-15 PROCEDURE — 25010000002 PIPERACILLIN SOD-TAZOBACTAM PER 1 G

## 2024-01-15 PROCEDURE — 94799 UNLISTED PULMONARY SVC/PX: CPT

## 2024-01-15 PROCEDURE — 80048 BASIC METABOLIC PNL TOTAL CA: CPT | Performed by: NURSE PRACTITIONER

## 2024-01-15 PROCEDURE — 99222 1ST HOSP IP/OBS MODERATE 55: CPT | Performed by: INTERNAL MEDICINE

## 2024-01-15 PROCEDURE — 85025 COMPLETE CBC W/AUTO DIFF WBC: CPT | Performed by: NURSE PRACTITIONER

## 2024-01-15 RX ORDER — ACETAMINOPHEN 650 MG/1
650 SUPPOSITORY RECTAL EVERY 4 HOURS PRN
Status: DISCONTINUED | OUTPATIENT
Start: 2024-01-15 | End: 2024-01-19 | Stop reason: HOSPADM

## 2024-01-15 RX ORDER — ACETAMINOPHEN 160 MG/5ML
650 SOLUTION ORAL EVERY 4 HOURS PRN
Status: DISCONTINUED | OUTPATIENT
Start: 2024-01-15 | End: 2024-01-19 | Stop reason: HOSPADM

## 2024-01-15 RX ORDER — POLYETHYLENE GLYCOL 3350 17 G/17G
17 POWDER, FOR SOLUTION ORAL DAILY PRN
Status: DISCONTINUED | OUTPATIENT
Start: 2024-01-15 | End: 2024-01-19 | Stop reason: HOSPADM

## 2024-01-15 RX ORDER — DIAZEPAM 5 MG/ML
2.5 INJECTION, SOLUTION INTRAMUSCULAR; INTRAVENOUS EVERY 6 HOURS PRN
Status: DISCONTINUED | OUTPATIENT
Start: 2024-01-15 | End: 2024-01-19 | Stop reason: HOSPADM

## 2024-01-15 RX ORDER — SODIUM CHLORIDE 9 MG/ML
40 INJECTION, SOLUTION INTRAVENOUS AS NEEDED
Status: DISCONTINUED | OUTPATIENT
Start: 2024-01-15 | End: 2024-01-19 | Stop reason: HOSPADM

## 2024-01-15 RX ORDER — NITROGLYCERIN 0.4 MG/1
0.4 TABLET SUBLINGUAL
Status: DISCONTINUED | OUTPATIENT
Start: 2024-01-15 | End: 2024-01-15

## 2024-01-15 RX ORDER — DIAZEPAM 5 MG/ML
2.5 INJECTION, SOLUTION INTRAMUSCULAR; INTRAVENOUS ONCE
Status: COMPLETED | OUTPATIENT
Start: 2024-01-15 | End: 2024-01-15

## 2024-01-15 RX ORDER — DIAZEPAM 5 MG/ML
5 INJECTION, SOLUTION INTRAMUSCULAR; INTRAVENOUS ONCE
Status: DISCONTINUED | OUTPATIENT
Start: 2024-01-15 | End: 2024-01-15

## 2024-01-15 RX ORDER — TOPIRAMATE 100 MG/1
200 CAPSULE, EXTENDED RELEASE ORAL NIGHTLY
COMMUNITY
End: 2024-01-19 | Stop reason: HOSPADM

## 2024-01-15 RX ORDER — PANTOPRAZOLE SODIUM 40 MG/1
40 TABLET, DELAYED RELEASE ORAL
Status: DISCONTINUED | OUTPATIENT
Start: 2024-01-15 | End: 2024-01-19 | Stop reason: HOSPADM

## 2024-01-15 RX ORDER — OXYBUTYNIN CHLORIDE 5 MG/1
5 TABLET, EXTENDED RELEASE ORAL DAILY
Status: DISCONTINUED | OUTPATIENT
Start: 2024-01-15 | End: 2024-01-19 | Stop reason: HOSPADM

## 2024-01-15 RX ORDER — SODIUM CHLORIDE 0.9 % (FLUSH) 0.9 %
10 SYRINGE (ML) INJECTION EVERY 12 HOURS SCHEDULED
Status: DISCONTINUED | OUTPATIENT
Start: 2024-01-15 | End: 2024-01-19 | Stop reason: HOSPADM

## 2024-01-15 RX ORDER — FAMOTIDINE 20 MG/1
40 TABLET, FILM COATED ORAL DAILY
Status: DISCONTINUED | OUTPATIENT
Start: 2024-01-15 | End: 2024-01-19 | Stop reason: HOSPADM

## 2024-01-15 RX ORDER — SODIUM CHLORIDE 0.9 % (FLUSH) 0.9 %
10 SYRINGE (ML) INJECTION AS NEEDED
Status: DISCONTINUED | OUTPATIENT
Start: 2024-01-15 | End: 2024-01-19 | Stop reason: HOSPADM

## 2024-01-15 RX ORDER — LACOSAMIDE 100 MG/1
100 TABLET ORAL DAILY
Status: DISCONTINUED | OUTPATIENT
Start: 2024-01-15 | End: 2024-01-19 | Stop reason: HOSPADM

## 2024-01-15 RX ORDER — CHOLECALCIFEROL (VITAMIN D3) 125 MCG
5 CAPSULE ORAL NIGHTLY PRN
Status: DISCONTINUED | OUTPATIENT
Start: 2024-01-15 | End: 2024-01-15

## 2024-01-15 RX ORDER — CHOLECALCIFEROL (VITAMIN D3) 125 MCG
5 CAPSULE ORAL NIGHTLY
Status: DISCONTINUED | OUTPATIENT
Start: 2024-01-15 | End: 2024-01-19 | Stop reason: HOSPADM

## 2024-01-15 RX ORDER — GABAPENTIN 100 MG/1
100 CAPSULE ORAL 3 TIMES DAILY PRN
Status: DISCONTINUED | OUTPATIENT
Start: 2024-01-15 | End: 2024-01-19 | Stop reason: HOSPADM

## 2024-01-15 RX ORDER — BISACODYL 5 MG/1
5 TABLET, DELAYED RELEASE ORAL DAILY PRN
Status: DISCONTINUED | OUTPATIENT
Start: 2024-01-15 | End: 2024-01-19 | Stop reason: HOSPADM

## 2024-01-15 RX ORDER — ERYTHROMYCIN 5 MG/G
OINTMENT OPHTHALMIC EVERY 12 HOURS
Status: DISCONTINUED | OUTPATIENT
Start: 2024-01-15 | End: 2024-01-16

## 2024-01-15 RX ORDER — BISACODYL 10 MG
10 SUPPOSITORY, RECTAL RECTAL DAILY PRN
Status: DISCONTINUED | OUTPATIENT
Start: 2024-01-15 | End: 2024-01-19 | Stop reason: HOSPADM

## 2024-01-15 RX ORDER — ACETAMINOPHEN 325 MG/1
650 TABLET ORAL EVERY 4 HOURS PRN
Status: DISCONTINUED | OUTPATIENT
Start: 2024-01-15 | End: 2024-01-19 | Stop reason: HOSPADM

## 2024-01-15 RX ORDER — FINASTERIDE 5 MG/1
5 TABLET, FILM COATED ORAL DAILY
Status: DISCONTINUED | OUTPATIENT
Start: 2024-01-15 | End: 2024-01-19 | Stop reason: HOSPADM

## 2024-01-15 RX ORDER — LOSARTAN POTASSIUM 25 MG/1
25 TABLET ORAL DAILY
Status: DISCONTINUED | OUTPATIENT
Start: 2024-01-15 | End: 2024-01-16

## 2024-01-15 RX ORDER — AMOXICILLIN 250 MG
2 CAPSULE ORAL 2 TIMES DAILY
Status: DISCONTINUED | OUTPATIENT
Start: 2024-01-15 | End: 2024-01-19 | Stop reason: HOSPADM

## 2024-01-15 RX ORDER — PHENOBARBITAL 64.8 MG/1
64.8 TABLET ORAL NIGHTLY
Status: DISCONTINUED | OUTPATIENT
Start: 2024-01-15 | End: 2024-01-19 | Stop reason: HOSPADM

## 2024-01-15 RX ORDER — PHENOBARBITAL 32.4 MG/1
32.4 TABLET ORAL NIGHTLY
Status: DISCONTINUED | OUTPATIENT
Start: 2024-01-15 | End: 2024-01-15

## 2024-01-15 RX ORDER — TOPIRAMATE 100 MG/1
200 TABLET, FILM COATED ORAL DAILY
Status: DISCONTINUED | OUTPATIENT
Start: 2024-01-15 | End: 2024-01-19 | Stop reason: HOSPADM

## 2024-01-15 RX ADMIN — PRAMIPEXOLE DIHYDROCHLORIDE 1.5 MG: 1 TABLET ORAL at 13:04

## 2024-01-15 RX ADMIN — SODIUM CHLORIDE, POTASSIUM CHLORIDE, SODIUM LACTATE AND CALCIUM CHLORIDE 125 ML/HR: 600; 310; 30; 20 INJECTION, SOLUTION INTRAVENOUS at 09:11

## 2024-01-15 RX ADMIN — SODIUM CHLORIDE, POTASSIUM CHLORIDE, SODIUM LACTATE AND CALCIUM CHLORIDE 125 ML/HR: 600; 310; 30; 20 INJECTION, SOLUTION INTRAVENOUS at 00:13

## 2024-01-15 RX ADMIN — PIPERACILLIN SODIUM AND TAZOBACTAM SODIUM 3.38 G: 3; .375 INJECTION, SOLUTION INTRAVENOUS at 02:09

## 2024-01-15 RX ADMIN — FAMOTIDINE 40 MG: 20 TABLET, FILM COATED ORAL at 12:43

## 2024-01-15 RX ADMIN — APIXABAN 5 MG: 5 TABLET, FILM COATED ORAL at 22:07

## 2024-01-15 RX ADMIN — GABAPENTIN 100 MG: 100 CAPSULE ORAL at 22:07

## 2024-01-15 RX ADMIN — PIPERACILLIN SODIUM AND TAZOBACTAM SODIUM 3.38 G: 3; .375 INJECTION, SOLUTION INTRAVENOUS at 22:06

## 2024-01-15 RX ADMIN — LACOSAMIDE 100 MG: 100 TABLET, FILM COATED ORAL at 12:43

## 2024-01-15 RX ADMIN — PHENOBARBITAL 64.8 MG: 64.8 TABLET ORAL at 22:07

## 2024-01-15 RX ADMIN — PIPERACILLIN SODIUM AND TAZOBACTAM SODIUM 3.38 G: 3; .375 INJECTION, SOLUTION INTRAVENOUS at 13:03

## 2024-01-15 RX ADMIN — PRAMIPEXOLE DIHYDROCHLORIDE 1.5 MG: 1 TABLET ORAL at 22:07

## 2024-01-15 RX ADMIN — OXYBUTYNIN CHLORIDE 5 MG: 5 TABLET, EXTENDED RELEASE ORAL at 12:43

## 2024-01-15 RX ADMIN — TOPIRAMATE 200 MG: 100 TABLET, FILM COATED ORAL at 12:43

## 2024-01-15 RX ADMIN — Medication 5 MG: at 22:07

## 2024-01-15 RX ADMIN — FINASTERIDE 5 MG: 5 TABLET, FILM COATED ORAL at 12:43

## 2024-01-15 RX ADMIN — DIAZEPAM 2.5 MG: 10 INJECTION, SOLUTION INTRAMUSCULAR; INTRAVENOUS at 04:32

## 2024-01-15 RX ADMIN — SODIUM CHLORIDE, POTASSIUM CHLORIDE, SODIUM LACTATE AND CALCIUM CHLORIDE 125 ML/HR: 600; 310; 30; 20 INJECTION, SOLUTION INTRAVENOUS at 21:12

## 2024-01-15 RX ADMIN — LOSARTAN POTASSIUM 25 MG: 25 TABLET, FILM COATED ORAL at 12:43

## 2024-01-15 RX ADMIN — APIXABAN 5 MG: 5 TABLET, FILM COATED ORAL at 12:43

## 2024-01-15 NOTE — PLAN OF CARE
Goal Outcome Evaluation:  Plan of Care Reviewed With: patient        Progress: no change  Outcome Evaluation: PT eval completed. Patient presents w/ decreased strength, balance deficits, gait instability, decreased postural control, decreased safety awareness, and is below his baseline. He required mod A for bed mobility, transfers, and ambulating 2ft w/ RW. Skilled IPPT warranted to improve pt's safety and independence w/ functional mobility. Recommend SNF rehab at D/C.      Anticipated Discharge Disposition (PT): skilled nursing facility

## 2024-01-15 NOTE — PLAN OF CARE
Goal Outcome Evaluation:His confusion is waning, Able to get place correct.  Time and date still elusive.  Slurring of speech has improved.  Tolerating new IV antibiotics. Feeds himself with no nausea.  Work with PT today shows him to be weak, barely able to stand and no ambulation. Will maintain padded rails with all 4 being raised.  Continue to monitor.

## 2024-01-15 NOTE — PROGRESS NOTES
[facility] Medicine Services  ADMISSION FOLLOW-UP NOTE          Patient admitted after midnight, H&P by my partner performed earlier on today's date reviewed.  Interim findings, labs, and charting also reviewed.        [facility] Hospital Problem List has been managed and updated to include any new diagnoses:  Active Hospital Problems    Diagnosis  POA    **Complicated UTI (urinary tract infection) [N39.0]  Yes    HLD (hyperlipidemia) [E78.5]  Yes    Seizure [R56.9]  Yes    BPH (benign prostatic hyperplasia) [N40.0]  Yes    GERD without esophagitis [K21.9]  Yes    Essential hypertension [I10]  Yes    Atrial fibrillation [I48.91]  Yes    Urinary tract infection [N39.0]  Yes      Resolved Hospital Problems   No resolved problems to display.     In summary, this is a 84 yo male w known seizure disorder on phenobarbitol (recently decreased 11/10/2023 after normal levels) who presented with confusion and breakthrough seizure in setting of dirty urinalysis    ADDITIONAL PLAN:  - zosyn for now, f/u urine cx  - d/w neurology team: back to prior dose of phenobarbitol. Unclear why ever decreased. Level here low + previously therapeutic on double this dose  - sz precautions, careful monitoring    Expected Discharge 1/19 (Discharge date is tentative pending patient's medical condition and is subject to change)  Expected Discharge Date: 1/19/2024; Expected Discharge Time:      Aminah Vides MD  01/15/24

## 2024-01-15 NOTE — ED PROVIDER NOTES
EMERGENCY DEPARTMENT ENCOUNTER    Pt Name: Caesar Kim  MRN: [id number]  Pt :   1940  Room Number:    Date of encounter:  2024  PCP: Provider, No Known  ED Provider: Javi Cha MD    Historian: Spouse, patient      HPI:  Chief Complaint: Confusion, UTI        Context: Caesra Kim is a 83-year-old man brought in by EMS and accompanied by his wife who presents for evaluation of altered mental status.  She says he has history of dementia but has been significantly more confused the last few days.  He was seen at [facility] on Wednesday diagnosed with urinary tract infection and discharged on antibiotics but she says since then he has had progressive decline he has not been sleeping for the last few days and has been progressively more confused.  Ultimately she had to call EMS.  She has not noticed any cough vomiting diarrhea but thinks he may still have a urinary tract infection.  Upon arrival here he says he has no pain but admits that he does not know what is going on he says that he is in [facility], and that this building is [facility], and that the month is [facility].       PAST MEDICAL HISTORY  Past Medical History:   Diagnosis Date    AAA (abdominal aortic aneurysm)     Arthritis     Atrial fibrillation     BPH (benign prostatic hyperplasia)     CHF (congestive heart failure)     COPD (chronic obstructive pulmonary disease)     Diabetes mellitus     Elevated cholesterol     GERD (gastroesophageal reflux disease)     Hypertension     Memory loss     Personal history of pulmonary embolism     Pulmonary embolism     Restless leg syndrome     Sleep apnea     Uses CPAP    Unspecified convulsions     Unspecified diastolic (congestive) heart failure     Urinary tract infection          PAST SURGICAL HISTORY  Past Surgical History:   Procedure Laterality Date    CHOLECYSTECTOMY      COLONOSCOPY      CYSTOSCOPY TRANSURETHRAL RESECTION OF PROSTATE N/A  2022    Procedure: CYSTOSCOPY TRANSURETHRAL RESECTION OF PROSTATE GREENLIGHT;  Surgeon: Darius Costa MD;  Location: [facility];  Service: Urology;  Laterality: N/A;    ENDOSCOPY      EYE SURGERY Bilateral     CATARACTS    SHOULDER SURGERY Left     SKIN BIOPSY      NECK    TONSILLECTOMY      AND ADENOIDS         FAMILY HISTORY  Family History   Problem Relation Age of Onset    Heart attack Other     Tuberculosis Other          SOCIAL HISTORY  Social History     Socioeconomic History    Marital status:    Tobacco Use    Smoking status: Former     Types: Cigarettes     Quit date:      Years since quittin.0    Smokeless tobacco: Former     Quit date:    Substance and Sexual Activity    Alcohol use: Never    Drug use: Not Currently    Sexual activity: Defer         ALLERGIES  Acyclovir, Atorvastatin, Fenofibrate, Metformin, and Mirabegron        REVIEW OF SYSTEMS  Review of Systems       All systems reviewed and negative except for those discussed in HPI.       PHYSICAL EXAM    I have reviewed the triage vital signs and nursing notes.    ED Triage Vitals [24]   Temp Heart Rate Resp BP SpO2   98.3 °F (36.8 °C) 69 20 (!) 136/108 97 %      Temp src Heart Rate Source Patient Position BP Location FiO2 (%)   Oral Monitor Lying Right arm --       Physical Exam  GENERAL:   Appears in no acute distress.   HENT: Nares patent.  EYES: No scleral icterus.  Conjunctival injection and purulent drainage from the right eye consistent with bacterial conjunctivitis  CV: Regular rhythm, regular rate.  RESPIRATORY: Normal effort.  No audible wheezes, rales or rhonchi.  ABDOMEN: Soft, nontender  MUSCULOSKELETAL: No deformities.   NEURO: Awake and alert but significantly disoriented in conversation, moves all extremities, follows commands.  SKIN: Warm, dry, no rash visualized.      LAB RESULTS  Recent Results (from the past 24 hour(s))   ECG 12 Lead ED Triage Standing Order; Weak / Dizzy / AMS     Collection Time: 01/14/24  8:43 PM   Result Value Ref Range    QT Interval 406 ms    QTC Interval 429 ms   Respiratory Panel PCR w/COVID-19(SARS-CoV-2) BRENNAN/MARIA TERESA/DARINEL/PAD/COR/DANIELLE In-House, NP Swab in UTM/VTM, 2 HR TAT - Swab, Nasopharynx    Collection Time: 01/14/24  9:59 PM    Specimen: Nasopharynx; Swab   Result Value Ref Range    ADENOVIRUS, PCR Not Detected Not Detected    Coronavirus 229E Not Detected Not Detected    Coronavirus HKU1 Not Detected Not Detected    Coronavirus NL63 Not Detected Not Detected    Coronavirus OC43 Not Detected Not Detected    COVID19 Not Detected Not Detected - Ref. Range    Human Metapneumovirus Not Detected Not Detected    Human Rhinovirus/Enterovirus Not Detected Not Detected    Influenza A PCR Not Detected Not Detected    Influenza B PCR Not Detected Not Detected    Parainfluenza Virus 1 Not Detected Not Detected    Parainfluenza Virus 2 Not Detected Not Detected    Parainfluenza Virus 3 Not Detected Not Detected    Parainfluenza Virus 4 Not Detected Not Detected    RSV, PCR Not Detected Not Detected    Bordetella pertussis pcr Not Detected Not Detected    Bordetella parapertussis PCR Not Detected Not Detected    Chlamydophila pneumoniae PCR Not Detected Not Detected    Mycoplasma pneumo by PCR Not Detected Not Detected   Comprehensive Metabolic Panel    Collection Time: 01/14/24 10:11 PM    Specimen: Blood   Result Value Ref Range    Glucose 99 65 - 99 mg/dL    BUN 23 8 - 23 mg/dL    Creatinine 1.13 0.76 - 1.27 mg/dL    Sodium 140 136 - 145 mmol/L    Potassium 4.2 3.5 - 5.2 mmol/L    Chloride 104 98 - 107 mmol/L    CO2 25.0 22.0 - 29.0 mmol/L    Calcium 9.6 8.6 - 10.5 mg/dL    Total Protein 7.9 6.0 - 8.5 g/dL    Albumin 4.2 3.5 - 5.2 g/dL    ALT (SGPT) 16 1 - 41 U/L    AST (SGOT) 27 1 - 40 U/L    Alkaline Phosphatase 53 39 - 117 U/L    Total Bilirubin 0.7 0.0 - 1.2 mg/dL    Globulin 3.7 gm/dL    A/G Ratio 1.1 g/dL    BUN/Creatinine Ratio 20.4 7.0 - 25.0    Anion Gap 11.0 5.0 - 15.0  mmol/L    eGFR 64.5 >60.0 mL/min/1.73   Single High Sensitivity Troponin T    Collection Time: 01/14/24 10:11 PM    Specimen: Blood   Result Value Ref Range    HS Troponin T 30 (H) <22 ng/L   Magnesium    Collection Time: 01/14/24 10:11 PM    Specimen: Blood   Result Value Ref Range    Magnesium 2.4 1.6 - 2.4 mg/dL   Green Top (Gel)    Collection Time: 01/14/24 10:11 PM   Result Value Ref Range    Extra Tube Hold for add-ons.    Lavender Top    Collection Time: 01/14/24 10:11 PM   Result Value Ref Range    Extra Tube hold for add-on    Gold Top - SST    Collection Time: 01/14/24 10:11 PM   Result Value Ref Range    Extra Tube Hold for add-ons.    Light Blue Top    Collection Time: 01/14/24 10:11 PM   Result Value Ref Range    Extra Tube Hold for add-ons.    CBC Auto Differential    Collection Time: 01/14/24 10:11 PM    Specimen: Blood   Result Value Ref Range    WBC 6.74 3.40 - 10.80 10*3/mm3    RBC 4.82 4.14 - 5.80 10*6/mm3    Hemoglobin 15.0 13.0 - 17.7 g/dL    Hematocrit 46.1 37.5 - 51.0 %    MCV 95.6 79.0 - 97.0 fL    MCH 31.1 26.6 - 33.0 pg    MCHC 32.5 31.5 - 35.7 g/dL    RDW 15.1 12.3 - 15.4 %    RDW-SD 53.6 37.0 - 54.0 fl    MPV 10.3 6.0 - 12.0 fL    Platelets 224 140 - 450 10*3/mm3    Neutrophil % 69.6 42.7 - 76.0 %    Lymphocyte % 15.4 (L) 19.6 - 45.3 %    Monocyte % 12.6 (H) 5.0 - 12.0 %    Eosinophil % 0.9 0.3 - 6.2 %    Basophil % 1.2 0.0 - 1.5 %    Immature Grans % 0.3 0.0 - 0.5 %    Neutrophils, Absolute 4.69 1.70 - 7.00 10*3/mm3    Lymphocytes, Absolute 1.04 0.70 - 3.10 10*3/mm3    Monocytes, Absolute 0.85 0.10 - 0.90 10*3/mm3    Eosinophils, Absolute 0.06 0.00 - 0.40 10*3/mm3    Basophils, Absolute 0.08 0.00 - 0.20 10*3/mm3    Immature Grans, Absolute 0.02 0.00 - 0.05 10*3/mm3    nRBC 0.0 0.0 - 0.2 /100 WBC   Lipase    Collection Time: 01/14/24 10:11 PM    Specimen: Blood   Result Value Ref Range    Lipase 38 13 - 60 U/L   Lactic Acid, Plasma    Collection Time: 01/14/24 10:11 PM    Specimen: Blood    Result Value Ref Range    Lactate 1.4 0.5 - 2.0 mmol/L   Procalcitonin    Collection Time: 01/14/24 10:11 PM    Specimen: Blood   Result Value Ref Range    Procalcitonin 0.15 0.00 - 0.25 ng/mL   C-reactive Protein    Collection Time: 01/14/24 10:11 PM    Specimen: Blood   Result Value Ref Range    C-Reactive Protein 1.13 (H) 0.00 - 0.50 mg/dL   Salicylate Level    Collection Time: 01/14/24 10:11 PM    Specimen: Blood   Result Value Ref Range    Salicylate <0.3 <=30.0 mg/dL   Ethanol    Collection Time: 01/14/24 10:11 PM    Specimen: Blood   Result Value Ref Range    Ethanol <10 0 - 10 mg/dL   Acetaminophen Level    Collection Time: 01/14/24 10:11 PM    Specimen: Blood   Result Value Ref Range    Acetaminophen <5.0 0.0 - 30.0 mcg/mL   CK    Collection Time: 01/14/24 10:11 PM    Specimen: Blood   Result Value Ref Range    Creatine Kinase 447 (H) 20 - 200 U/L   Phosphorus    Collection Time: 01/14/24 10:11 PM    Specimen: Blood   Result Value Ref Range    Phosphorus 4.2 2.5 - 4.5 mg/dL   TSH    Collection Time: 01/14/24 10:11 PM    Specimen: Blood   Result Value Ref Range    TSH 5.000 (H) 0.270 - 4.200 uIU/mL   T4, Free    Collection Time: 01/14/24 10:11 PM    Specimen: Blood   Result Value Ref Range    Free T4 1.08 0.93 - 1.70 ng/dL   Phenobarbital Level    Collection Time: 01/14/24 10:11 PM    Specimen: Blood   Result Value Ref Range    Phenobarbital 4.6 (L) 10.0 - 30.0 mcg/mL   Urinalysis With Microscopic If Indicated (No Culture) - Urine, Clean Catch    Collection Time: 01/14/24 10:33 PM    Specimen: Urine, Clean Catch   Result Value Ref Range    Color, UA Yellow Yellow, Straw    Appearance, UA Turbid (A) Clear    pH, UA <=5.0 5.0 - 8.0    Specific Gravity, UA 1.023 1.001 - 1.030    Glucose, UA Negative Negative    Ketones, UA Negative Negative    Bilirubin, UA Negative Negative    Blood, UA Small (1+) (A) Negative    Protein, UA 30 mg/dL (1+) (A) Negative    Leuk Esterase, UA Large (3+) (A) Negative    Nitrite,  UA Negative Negative    Urobilinogen, UA 1.0 E.U./dL 0.2 - 1.0 E.U./dL   Urine Drug Screen - Urine, Clean Catch    Collection Time: 01/14/24 10:33 PM    Specimen: Urine, Clean Catch   Result Value Ref Range    THC, Screen, Urine Negative Negative    Phencyclidine (PCP), Urine Negative Negative    Cocaine Screen, Urine Negative Negative    Methamphetamine, Ur Negative Negative    Opiate Screen Negative Negative    Amphetamine Screen, Urine Negative Negative    Benzodiazepine Screen, Urine Negative Negative    Tricyclic Antidepressants Screen Negative Negative    Methadone Screen, Urine Negative Negative    Barbiturates Screen, Urine Positive (A) Negative    Oxycodone Screen, Urine Negative Negative    Buprenorphine, Screen, Urine Negative Negative   Fentanyl, Urine - Urine, Clean Catch    Collection Time: 01/14/24 10:33 PM    Specimen: Urine, Clean Catch   Result Value Ref Range    Fentanyl, Urine Negative Negative   Urinalysis, Microscopic Only - Urine, Clean Catch    Collection Time: 01/14/24 10:33 PM    Specimen: Urine, Clean Catch   Result Value Ref Range    RBC, UA 3-5 (A) None Seen, 0-2 /HPF    WBC, UA Too Numerous to Count (A) None Seen, 0-2 /HPF    Bacteria, UA Trace None Seen, Trace /HPF    Squamous Epithelial Cells, UA 3-6 (A) None Seen, 0-2 /HPF    Yeast, UA Moderate/2+ Yeast None Seen /HPF    Hyaline Casts, UA 13-20 0 - 6 /LPF    Calcium Oxalate Crystals, UA Large/3+ None Seen /HPF    Methodology Manual Light Microscopy        If labs were ordered, I independently reviewed the results and considered them in treating the patient.        RADIOLOGY  CT Head Without Contrast    Result Date: 1/14/2024  CT HEAD WO CONTRAST Date of Exam: 1/14/2024 9:21 PM EST Indication: Altered mental status, suspect metabolic.. Comparison: CT head November 22, 2023 Technique: Axial CT images were obtained of the head without contrast administration.  Automated exposure control and iterative construction methods were used.  Findings: There is moderate diffuse generalized atrophy. There are low-attenuation areas in the periventricular white matter consistent with chronic microvascular ischemic change. There is no mass, mass effect or midline shift. There are no abnormal extra-axial fluid collections or areas of acute hemorrhage. The paranasal sinuses are clear. The mastoid air cells are clear.     Impression: Moderate atrophy and chronic microvascular ischemia. No acute intracranial process. Electronically Signed: Christopher Collins MD  1/14/2024 9:51 PM EST  Workstation ID: [id number]    XR Chest 1 View    Result Date: 1/14/2024  XR CHEST 1 VW Date of Exam: 1/14/2024 8:40 PM EST Indication: Weak/Dizzy/AMS triage protocol Comparison: None available. Findings: The heart is enlarged. There is tortuosity of the thoracic aorta. Lung volumes are slightly decreased. The lungs are clear.     Impression: No active disease Electronically Signed: Christopher Collins MD  1/14/2024 9:02 PM EST  Workstation ID: [id number]     I ordered and independently reviewed the above noted radiographic studies.      I viewed images of head CT does not show any acute pathology that I can appreciate.  No bleeds, masses, strokes, independent interpretation.  Chest x-ray which shows clear lungs, no cardiomegaly or pneumonia, no acute pathology per my independent interpretation.    See radiologist's dictation for official interpretation.        PROCEDURES    Procedures    ECG 12 Lead ED Triage Standing Order; Weak / Dizzy / AMS   Preliminary Result   Test Reason : ED Triage Standing Order~   Blood Pressure :   */*   mmHG   Vent. Rate :  67 BPM     Atrial Rate :  68 BPM      P-R Int :   * ms          QRS Dur :  86 ms       QT Int : 406 ms       P-R-T Axes :   * -30  51 degrees      QTc Int : 429 ms      Accelerated Junctional rhythm   Left axis deviation   Inferior infarct , age undetermined   Abnormal ECG   When compared with ECG of 02-DEC-2022 06:27,   Junctional rhythm has  replaced Atrial fibrillation   QRS axis shifted left   Nonspecific T wave abnormality now evident in Anterior leads      Referred By: EDMD           Confirmed By:           MEDICATIONS GIVEN IN ER    Medications   sodium chloride 0.9 % flush 10 mL (has no administration in time range)   lactated ringers infusion (125 mL/hr Intravenous New Bag 1/15/24 0013)   cefTRIAXone (ROCEPHIN) 2,000 mg in sodium chloride 0.9 % 100 mL IVPB (has no administration in time range)   erythromycin (ROMYCIN) ophthalmic ointment 1 application  (1 application  Right Eye Given 1/14/24 2237)   sodium chloride 0.9 % bolus 1,000 mL (0 mL Intravenous Stopped 1/14/24 2337)         MEDICAL DECISION MAKING, PROGRESS, and CONSULTS    All labs, if obtained, have been independently reviewed by me.  All radiology studies, if obtained, have been reviewed by me and the radiologist dictating the report.  All EKG's, if obtained, have been independently viewed and interpreted by me/my attending physician.      Discussion below represents my analysis of pertinent findings related to patient's condition, differential diagnosis, treatment plan and final disposition.                         Differential diagnosis:    Stroke, seizure, sepsis, delirium, sundowning, dehydration, anemia, electrolyte abnormality, rhabdomyolysis, pneumonia, UTI, bacteremia, COVID, flu, viral URI      Additional sources:    - Discussed/ obtained information from independent historians: Spouse    - External (non-ED) record review: Previous hospitalization shows history of HTN, HLD, chronic diastolic CHF, COPD, GERD, PE, Afib on Eliquis, BPH, RLS, seizure disorder, and memory loss     - Chronic or social conditions impacting care: Dementia, heart failure, COPD, seizure disorder    - Shared decision making: Agreeable to hospital admission      Orders placed during this visit:  Orders Placed This Encounter   Procedures    COVID PRE-OP / PRE-PROCEDURE SCREENING ORDER (NO ISOLATION) -  Swab, Nasopharynx    Blood Culture - Blood,    Blood Culture - Blood,    Respiratory Panel PCR w/COVID-19(SARS-CoV-2) BRENNAN/MARIA TERESA/DARINEL/PAD/COR/DANIELLE In-House, NP Swab in UTM/VTM, 2 HR TAT - Swab, Nasopharynx    Urine Culture - Urine,    XR Chest 1 View    CT Head Without Contrast    Temple Draw    Comprehensive Metabolic Panel    Single High Sensitivity Troponin T    Magnesium    Urinalysis With Microscopic If Indicated (No Culture) - Urine, Clean Catch    CBC Auto Differential    Lipase    Lactic Acid, Plasma    Procalcitonin    C-reactive Protein    Salicylate Level    Urine Drug Screen - Urine, Clean Catch    Ethanol    Acetaminophen Level    CK    Phosphorus    TSH    T4, Free    Phenobarbital Level    Fentanyl, Urine - Urine, Clean Catch    Urinalysis, Microscopic Only - Urine, Clean Catch    NPO Diet NPO Type: Strict NPO    Continuous Pulse Oximetry    Vital Signs    Oxygen Therapy- Nasal Cannula; Titrate 1-6 LPM Per SpO2; 90 - 95%    ECG 12 Lead ED Triage Standing Order; Weak / Dizzy / AMS    Insert Peripheral IV    Fall Precautions    CBC & Differential    Green Top (Gel)    Lavender Top    Gold Top - SST    Gray Top    Light Blue Top         Additional orders considered but not ordered:      ED Course:    Consultants:      ED Course as of 01/15/24 0118   Sun Jan 14, 2024 2111 This is an 83-year-old man brought in by EMS and accompanied by his wife who presents for evaluation of altered mental status.  She says he has history of dementia but has been significantly more confused the last few days.  He was seen at [facility] on Wednesday diagnosed with urinary tract infection and discharged on antibiotics but she says since then he has had progressive decline he has not been sleeping for the last few days and has been progressively more confused.  Ultimately she had to call EMS.  She has not noticed any cough vomiting diarrhea but thinks he may still have a urinary tract infection.  Upon arrival here  he says he has no pain but admits that he does not know what is going on he says that he is in [facility], and that this building is [facility], and that the month is [facility]. [CC]   2113 He arrived awake and alert but obviously confused.  I do not appreciate any focal deficits on full neurologic exam.  He does have purulent discharge from apparent bacterial conjunctivitis of the right eye.  Abdomen is soft.  Starting on IV fluids and obtaining full infectious and altered mental status workup.  Will reevaluate pending initial results but I suspect he will need hospitalization for further workup and potentially placement. [CC]   Mon Bret 15, 2024   0114 Head CT does not show any acute pathology.  Chest x-ray is clear. [CC]   0116 Urinalysis remains infected I sent urine cultures and started on ceftriaxone.  CBC and CMP reassuring and nonactionable.  He does have significantly elevated CK at 447 I suspect this is due to volume depletion and should hopefully improve with IV fluids.  The rest of his labs are generally reassuring and nonactionable he remains confused upon reevaluation I think needs hospital admission for his altered mental status and UTI.  Medicine team consulted for admission. [CC]      ED Course User Index  [CC] Javi Cha MD              Shared Decision Making:  After my consideration of clinical presentation and any laboratory/radiology studies obtained, I discussed the findings with the patient/patient representative who is in agreement with the treatment plan and the final disposition.   Risks and benefits of discharge and/or observation/admission were discussed.       AS OF 01:18 EST VITALS:    BP - 114/69  HR - 59  TEMP - 98.3 °F (36.8 °C) (Oral)  O2 SATS - 95%                  DIAGNOSIS  Final diagnoses:   Altered mental status, unspecified altered mental status type   Acute UTI (urinary tract infection)   Elevated CK   Encephalopathy, unspecified    Essential hypertension         DISPOSITION  Admit      Please note that portions of this document were completed with voice recognition software.        Javi Cha MD  01/15/24 0120       Javi Cha MD  01/15/24 0151

## 2024-01-15 NOTE — H&P
[facility] Medicine Services  HISTORY AND PHYSICAL    Patient Name: Caesar Kim  : 1940  MRN: [id number]  Primary Care Physician: Provider, No Known  Date of admission: 2024    Subjective   Subjective     Chief Complaint:  Confusion    HPI:  Caesar Kim is a 83 y.o. male with a history of HTN, HLD, diastolic CHF, COPD, GERD, PE, A-fib on Eliquis, BPH, RLS, seizure disorder, memory loss, presents to the ED with confusion that started 2 days ago.  Patient is a poor historian due to his AMS, information for H&P per wife at bedside.  Wife reports that patient was diagnosed with a urinary tract infection on Friday at [facility] and prescribed Cipro.  Patient has had similar symptoms when he has had a UTI in the past.  Despite taking antibiotics his confusion has worsened which prompted her to bring him to the ED for evaluation.  He also has been experiencing worsening generalized weakness and she has been having difficulty helping him around their home.  She states that when she woke up on  morning she found Mr. Kim on the floor for an unknown amount of time.  Wife also reports that yesterday patient had 2 seizures at home despite taking his medications as prescribed.  Mr. Kim complains of some pain in his right eye.  Wife notes that his eye has been red for the past 2 days.  No fevers, chills, chest pain, vomiting, diarrhea, or any other complaints at this time.  UA consistent with urinary tract infection.  CT head shows moderate atrophy and chronic microvascular ischemia, no acute intracranial process.  Chest x-ray shows no active disease.  Patient is being admitted to the hospital medicine service for further evaluation and management.        Review of Systems   Unable to perform ROS: Mental status change                Personal History     Past Medical History:   Diagnosis Date    AAA (abdominal aortic aneurysm)     Arthritis     Atrial fibrillation     BPH (benign  prostatic hyperplasia)     CHF (congestive heart failure)     COPD (chronic obstructive pulmonary disease)     Diabetes mellitus     Elevated cholesterol     GERD (gastroesophageal reflux disease)     Hypertension     Memory loss     Personal history of pulmonary embolism     Pulmonary embolism     Restless leg syndrome     Sleep apnea     Uses CPAP    Unspecified convulsions     Unspecified diastolic (congestive) heart failure     Urinary tract infection              Past Surgical History:   Procedure Laterality Date    CHOLECYSTECTOMY      COLONOSCOPY      CYSTOSCOPY TRANSURETHRAL RESECTION OF PROSTATE N/A 12/2/2022    Procedure: CYSTOSCOPY TRANSURETHRAL RESECTION OF PROSTATE GREENLIGHT;  Surgeon: Darius Costa MD;  Location: [facility];  Service: Urology;  Laterality: N/A;    ENDOSCOPY      EYE SURGERY Bilateral     CATARACTS    SHOULDER SURGERY Left     SKIN BIOPSY      NECK    TONSILLECTOMY      AND ADENOIDS       Family History:  family history includes Heart attack in an other family member; Tuberculosis in an other family member.     Social History:  reports that he quit smoking about 22 years ago. His smoking use included cigarettes. He has been exposed to tobacco smoke. He quit smokeless tobacco use about 22 years ago.  His smokeless tobacco use included chew. Drug use questions deferred to the physician. He reports that he does not drink alcohol.  Social History     Social History Narrative    Not on file       Medications:  Fluticasone Propionate (Inhal), PHENobarbital, Topiramate ER, acetaminophen, apixaban, docusate sodium, ezetimibe, famotidine, finasteride, gabapentin, lacosamide, losartan, melatonin, omeprazole, pramipexole, and trospium    Allergies   Allergen Reactions    Acyclovir GI Intolerance    Atorvastatin Myalgia    Fenofibrate Myalgia    Metformin Unknown - High Severity     Other reaction(s): Unknown      Mirabegron Unknown - High Severity     Other reaction(s): Unknown          Objective   Objective     Vital Signs:   Temp:  [96.9 °F (36.1 °C)-98.3 °F (36.8 °C)] 96.9 °F (36.1 °C)  Heart Rate:  [50-70] 60  Resp:  [18-20] 19  BP: ()/() 100/69    Physical Exam   Constitutional: Awake, alert, wife at bedside  Eyes: PERRLA, sclerae anicteric, no conjunctival injection  HENT: NCAT, mucous membranes moist  Neck: Supple, no thyromegaly, no lymphadenopathy, trachea midline  Respiratory: Clear to auscultation bilaterally, nonlabored respirations   Cardiovascular: RRR, no murmurs, rubs, or gallops, palpable pedal pulses bilaterally  Gastrointestinal: Positive bowel sounds, soft, nontender, nondistended  Musculoskeletal: No bilateral ankle edema, no clubbing or cyanosis to extremities  Psychiatric: Appropriate affect, cooperative  Neurologic: Oriented to self, strength symmetric in all extremities, Cranial Nerves grossly intact to confrontation  Skin: No rashes       Result Review:  I have personally reviewed the results from the time of this admission to 1/15/2024 16:44 EST and agree with these findings:  [x]  Laboratory list / accordion  [x]  Microbiology  [x]  Radiology  [x]  EKG/Telemetry   []  Cardiology/Vascular   []  Pathology  [x]  Old records  []  Other:  Most notable findings include:     LAB RESULTS:      Lab 01/15/24  0528 01/14/24  2211   WBC 5.05 6.74   HEMOGLOBIN 12.7* 15.0   HEMATOCRIT 39.5 46.1   PLATELETS 183 224   NEUTROS ABS 2.65 4.69   IMMATURE GRANS (ABS) 0.01 0.02   LYMPHS ABS 1.22 1.04   MONOS ABS 0.95* 0.85   EOS ABS 0.16 0.06   MCV 96.3 95.6   CRP  --  1.13*   PROCALCITONIN  --  0.15   LACTATE  --  1.4   CK TOTAL 368* 447*         Lab 01/15/24  0528 01/14/24  2211   SODIUM 141 140   POTASSIUM 3.8 4.2   CHLORIDE 108* 104   CO2 24.0 25.0   ANION GAP 9.0 11.0   BUN 22 23   CREATININE 1.11 1.13   EGFR 65.9 64.5   GLUCOSE 82 99   CALCIUM 8.9 9.6   MAGNESIUM  --  2.4   PHOSPHORUS  --  4.2   TSH  --  5.000*         Lab 01/14/24  2211   TOTAL PROTEIN 7.9   ALBUMIN  4.2   GLOBULIN 3.7   ALT (SGPT) 16   AST (SGOT) 27   BILIRUBIN 0.7   ALK PHOS 53   LIPASE 38         Lab 01/15/24  0528 01/15/24  0210 01/14/24  2211   HSTROP T 30* 32* 30*                 Brief Urine Lab Results  (Last result in the past 365 days)        Color   Clarity   Blood   Leuk Est   Nitrite   Protein   CREAT   Urine HCG        01/14/24 2233 Yellow   Turbid   Small (1+)   Large (3+)   Negative   30 mg/dL (1+)                 Microbiology Results (last 10 days)       Procedure Component Value - Date/Time    COVID PRE-OP / PRE-PROCEDURE SCREENING ORDER (NO ISOLATION) - Swab, Nasopharynx [[id number]]  (Normal) Collected: 01/14/24 2159    Lab Status: Final result Specimen: Swab from Nasopharynx Updated: 01/14/24 2322    Narrative:      The following orders were created for panel order COVID PRE-OP / PRE-PROCEDURE SCREENING ORDER (NO ISOLATION) - Swab, Nasopharynx.  Procedure                               Abnormality         Status                     ---------                               -----------         ------                     Respiratory Panel PCR w/...[[id number]]  Normal              Final result                 Please view results for these tests on the individual orders.    Respiratory Panel PCR w/COVID-19(SARS-CoV-2) BRENNAN/MARIA TERESA/DARINEL/PAD/COR/DANIELLE In-House, NP Swab in UTM/VTM, 2 HR TAT - Swab, Nasopharynx [[id number]]  (Normal) Collected: 01/14/24 2159    Lab Status: Final result Specimen: Swab from Nasopharynx Updated: 01/14/24 2322     ADENOVIRUS, PCR Not Detected     Coronavirus 229E Not Detected     Coronavirus HKU1 Not Detected     Coronavirus NL63 Not Detected     Coronavirus OC43 Not Detected     COVID19 Not Detected     Human Metapneumovirus Not Detected     Human Rhinovirus/Enterovirus Not Detected     Influenza A PCR Not Detected     Influenza B PCR Not Detected     Parainfluenza Virus 1 Not Detected     Parainfluenza Virus 2 Not Detected     Parainfluenza Virus 3 Not Detected     Parainfluenza  Virus 4 Not Detected     RSV, PCR Not Detected     Bordetella pertussis pcr Not Detected     Bordetella parapertussis PCR Not Detected     Chlamydophila pneumoniae PCR Not Detected     Mycoplasma pneumo by PCR Not Detected    Narrative:      In the setting of a positive respiratory panel with a viral infection PLUS a negative procalcitonin without other underlying concern for bacterial infection, consider observing off antibiotics or discontinuation of antibiotics and continue supportive care. If the respiratory panel is positive for atypical bacterial infection (Bordetella pertussis, Chlamydophila pneumoniae, or Mycoplasma pneumoniae), consider antibiotic de-escalation to target atypical bacterial infection.            EEG    Result Date: 1/15/2024  Reason for referral: 83 y.o.male with seizure, altered mental status Technical Summary:  A 19 channel digital EEG was performed using the international 10-20 placement system, including eye leads and EKG leads. Duration: 20 minutes Findings: The patient is drowsy.  The background shows diffuse medium amplitude 5-6 Hz theta which is present symmetrically over both hemispheres.  A clear posterior rhythm is not seen.  EMG artifact is variably prominent.  Light sleep is seen with mild slowing of the tracing.  Photic stimulation does not change the background.  Hyperventilation is not performed.  No focal features or epileptiform activity are seen. Video: Available Technical quality: Superior EKG: Irregular, 50 to 60 bpm SUMMARY: Mild generalized slow No focal features or epileptiform activity are seen     Impression: Diffuse cerebral dysfunction of mild degree, nonspecific No ongoing seizures are seen This report is transcribed using the Dragon dictation system.      MRI Brain Without Contrast    Result Date: 1/15/2024  MRI BRAIN WO CONTRAST Date of Exam: 1/15/2024 4:10 AM EST Indication: AMS, seizure.  Comparison: CT scan of the head dated January 14, 2024 Technique:   Routine multiplanar/multisequence sequence images of the brain were obtained without contrast administration. Findings: There is diffuse generalized atrophy. There are areas of increased T2 and FLAIR signal throughout the bilateral periventricular and subcortical white matter consistent with chronic microvascular ischemia. The diffusion weighted sequences are normal. The major intracranial flow voids are preserved. There are no air-fluid levels in the sinuses to indicate acute sinusitis.     Impression: Impression: Atrophy and chronic microvascular ischemic change. No convincing acute intracranial process. Electronically Signed: Christopher Collins MD  1/15/2024 5:13 AM EST  Workstation ID: [id number]    XR Abdomen KUB    Result Date: 1/15/2024  XR ABDOMEN KUB Date of Exam: 1/15/2024 2:10 AM EST Indication: mri clearance Comparison: None available. 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 The gas pattern is nonspecific. There are clips from cholecystectomy. Review of the chest radiograph demonstrates no evidence of metallic foreign body, spinal stimulator or cardiac pacer device. Review of the patient's head CT fails to demonstrate metal in the eyes or evidence of cochlear implant.     Impression: Impression: The patient is cleared for MRI as detailed. Electronically Signed: Christopher Collins MD  1/15/2024 2:39 AM EST  Workstation ID: [id number]    CT Head Without Contrast    Result Date: 1/14/2024  CT HEAD WO CONTRAST Date of Exam: 1/14/2024 9:21 PM EST Indication: Altered mental status, suspect metabolic.. Comparison: CT head November 22, 2023 Technique: Axial CT images were obtained of the head without contrast administration.  Automated exposure control and iterative construction methods were used. Findings: There is moderate diffuse generalized atrophy. There are low-attenuation areas in the periventricular white matter consistent with chronic microvascular ischemic change. There is no mass, mass effect or  midline shift. There are no abnormal extra-axial fluid collections or areas of acute hemorrhage. The paranasal sinuses are clear. The mastoid air cells are clear.     Impression: Impression: Moderate atrophy and chronic microvascular ischemia. No acute intracranial process. Electronically Signed: Christopher Collins MD  1/14/2024 9:51 PM EST  Workstation ID: [id number]    XR Chest 1 View    Result Date: 1/14/2024  XR CHEST 1 VW Date of Exam: 1/14/2024 8:40 PM EST Indication: Weak/Dizzy/AMS triage protocol Comparison: None available. Findings: The heart is enlarged. There is tortuosity of the thoracic aorta. Lung volumes are slightly decreased. The lungs are clear.     Impression: Impression: No active disease Electronically Signed: Christopher Collins MD  1/14/2024 9:02 PM EST  Workstation ID: [id number]         Assessment & Plan   Assessment & Plan       Complicated UTI (urinary tract infection)    Urinary tract infection    Essential hypertension    Atrial fibrillation    HLD (hyperlipidemia)    Seizure    BPH (benign prostatic hyperplasia)    GERD without esophagitis    Caesar Kim is a 83 y.o. male with a history of HTN, HLD, diastolic CHF, COPD, GERD, PE, A-fib on Eliquis, BPH, RLS, seizure disorder, memory loss, presents to the ED with confusion that started 2 days ago.      Assessment and plan:    Acute UTI  -- UA: Appearance turbid, blood small, leukocytes large, protein 30, RBC 3-5, WBC TNTC, squamous 3-6  -- Urine culture pending  -- failed cipro outpatient  -- zosyn  -- BC x 2    AMS  Seizure disorder  -- Ethanol <10, acetaminophen <5, UDS positive barbiturates, phenobarbital level 4.6  -- CT head shows moderate atrophy and chronic microvascular ischemia, no acute intracranial process.  -- Chest x-ray shows no active disease  -- MRI brain with and without contrast  -- EEG  -- Consult neuro  -- continue Vimpat and phenobarbital  -- Follows with Dr. Garcia  -- discontinue cipro  -- seizure  precautions    Rhabdomyolysis  -- Recheck CK in the a.m.  -- LR at 125 mL/hour    Elevated troponin  -- HS troponin 30  -- denies chest pain  -- trend troponin and EKG    Hypertension  Hyperlipidemia  Chronic diastolic CHF  -- monitor I&O's  -- daily weights  -- Losartan,    A-fib  History of PE  -- Continue Eliquis    GERD  -- Continue Pepcid, Protonix    BPH  -- Continue Proscar    RLS  -- Continue Mirapex    Conjunctivitis right eye  -- Erythromycin ointment    Generalized Weakness  -- fall precautions  -- pt/ot consult  -- consult case management      DVT prophylaxis: On Eliquis    CODE STATUS:    Medical Intervention Limits: NO intubation (DNI)  Level Of Support Discussed With: Next of Kin (If No Surrogate)  Code Status (Patient has no pulse and is not breathing): No CPR (Do Not Attempt to Resuscitate)  Medical Interventions (Patient has pulse or is breathing): Limited Support  Comments: Patient's wife reports having documentation, she reports she will provide a copy.      Expected Discharge1/19 (Discharge date is tentative pending patient's medical condition and is subject to change)  Expected Discharge Date: 1/19/2024; Expected Discharge Time:       This note has been completed as part of a split-shared workflow.     Signature: Electronically signed by HEIDI Gilbert, 01/15/24, 1:46 AM EST.       Attending   Admission Attestation       I have performed an independent face-to-face diagnostic evaluation including performing an independent physical examination.  I approve of the documented plan of care above that was reviewed and developed with the advanced practice clinician (APC) and take responsibility for that plan along with its associated risks.  I have updated the HPI as appropriate.    Brief HPI    Mr. Kim is a 83-year-old male brought into the emergency department by his wife for concerns of ongoing altered mental status.  Unfortunately, patient himself is not able to provide significant  history of present illness, HPI was obtained from wife at bedside.  She reports has been ongoing issue since October, with multiple frequent urinary tract infections.  More recently, he has been taking Cipro, currently day 2 without any improvement.  She reports that he has had 2 breakthrough seizures in the setting of known seizure disorder in the past last night, but none since.  Labs show concern for probable early rhabdo.  Patient is likely experiencing multiple adverse side effects from Cipro due to breakthrough seizures and rhabdomyolysis.  Due to the recurrent nature of his encephalopathy, patient has become increasingly difficult to manage at home, the patient's wife expressed interest in placement at discharge.  IV antibiotics, Zosyn for urinary tract infection, culture obtained and pending.  Antibiotics be tailored to sensitivities as they result.  EEG, MRI, neuroconsult for seizures, although, is likely sequela of fluoroquinolones, which has been discontinued.  CT of the head did not show acute CVA. Case management/PT/OT for discharge planning.      Attending Physical Exam:  Temp:  [96.9 °F (36.1 °C)-98.3 °F (36.8 °C)] 96.9 °F (36.1 °C)  Heart Rate:  [50-70] 60  Resp:  [18-20] 19  BP: ()/() 100/69    Constitutional: Awake, alert, wife at bedside  Eyes: PERRLA, sclerae anicteric, no conjunctival injection  HENT: NCAT, mucous membranes moist  Neck: Supple, no thyromegaly, no lymphadenopathy, trachea midline  Respiratory: Clear to auscultation bilaterally, nonlabored respirations   Cardiovascular: RRR, no murmurs, rubs, or gallops, palpable pedal pulses bilaterally.  Appears euvolemic.  Gastrointestinal: Positive bowel sounds, soft, nontender, nondistended  Musculoskeletal: No bilateral ankle edema, no clubbing or cyanosis to extremities  Psychiatric: Appropriate affect, limitedly cooperative  Neurologic: Oriented to self only, strength symmetric in all extremities, Cranial Nerves grossly intact  to confrontation, speech clear  Skin: No rashes    Result Review:  I have personally reviewed the results from the time of this admission to 1/15/2024 16:44 EST and agree with these findings:  [x]  Laboratory list / accordion  [x]  Microbiology  [x]  Radiology  [x]  EKG/Telemetry   [x]  Cardiology/Vascular   []  Pathology  [x]  Old records  []  Other:    Assessment and Plan:    See assessment and plan documented by APC above and updated/edited by me as appropriate.         Dr Oquendo completed this note but unfortunately failed to sign. Signing in his stead 1/15 after careful review   -Dr Aminah Vides

## 2024-01-15 NOTE — CONSULTS
[facility] Neurology  Consult Note    Patient Name: Caesar Kim  : 1940  MRN: [id number]  Primary Care Physician:  Provider, No Known  Referring Physician: No ref. provider found  Date of admission: 2024    Subjective     Reason for Consult/ Chief Complaint: Seizure    Caesar Kim is a 83 y.o. male with past medical history of HTN, A-fib, HLD, BPH, dementia and GERD who presented to [facility] ED via EMS for increasing confusion.  Patient was seen at [facility] on 1/10/2024 was diagnosed with a UTI and placed on ciprofloxacin.  Patient's wife endorses that he has had 2 seizures yesterday.  Patient's wife states that on  morning she did find him on the floor for an unknown disclosed amount of time.  She also endorses that he has not slept very well in the past 3 days.    Patient is a known patient of Dr. Garcia at [facility] for partial seizures.  He is currently on lacosamide 100 mg daily, Trokendi  mg daily and phenobarbital 32.4 mg daily.  Per wife patient had a recent hospitalization which afterwards went to [facility] where patient was receiving phenobarbital 32.4 mg instead of his prescribed 64.8 mg .  It was decided to keep patient on 32.4 mg.    Patient was subtherapeutic phenobarbital level; 2.4.    Review Of Systems   Constitutional: Chronically ill male  Cardiovascular: Negative for chest pain or palpitations.  Respiratory: Negative for shortness of breath or cough.  Gastrointestinal: Negative for nausea and vomiting.  Genitourinary: Negative for bladder incontinence.  Musculoskeletal: Negative for aches and pains in the muscles or joints.  Dermatological: Negative for skin breakdown.   Neurological: Positive for seizures    Personal History     Past Medical History:   Diagnosis Date    AAA (abdominal aortic aneurysm)     Arthritis     Atrial fibrillation     BPH (benign prostatic hyperplasia)     CHF (congestive heart failure)     COPD (chronic obstructive pulmonary  disease)     Diabetes mellitus     Elevated cholesterol     GERD (gastroesophageal reflux disease)     Hypertension     Memory loss     Personal history of pulmonary embolism     Pulmonary embolism     Restless leg syndrome     Sleep apnea     Uses CPAP    Unspecified convulsions     Unspecified diastolic (congestive) heart failure     Urinary tract infection        Past Surgical History:   Procedure Laterality Date    CHOLECYSTECTOMY      COLONOSCOPY      CYSTOSCOPY TRANSURETHRAL RESECTION OF PROSTATE N/A 12/2/2022    Procedure: CYSTOSCOPY TRANSURETHRAL RESECTION OF PROSTATE GREENLIGHT;  Surgeon: Darius Costa MD;  Location: [facility];  Service: Urology;  Laterality: N/A;    ENDOSCOPY      EYE SURGERY Bilateral     CATARACTS    SHOULDER SURGERY Left     SKIN BIOPSY      NECK    TONSILLECTOMY      AND ADENOIDS       Family History: family history includes Heart attack in an other family member; Tuberculosis in an other family member. Otherwise pertinent FHx was reviewed and not pertinent to current issue.    Social History:  reports that he quit smoking about 22 years ago. His smoking use included cigarettes. He has been exposed to tobacco smoke. He quit smokeless tobacco use about 22 years ago.  His smokeless tobacco use included chew. Drug use questions deferred to the physician. He reports that he does not drink alcohol.    Home Medications:   Fluticasone Propionate (Inhal), PHENobarbital, acetaminophen, apixaban, docusate sodium, ezetimibe, famotidine, finasteride, gabapentin, lacosamide, losartan, melatonin, omeprazole, pramipexole, topiramate, and trospium    Current Medications:     Current Facility-Administered Medications:     acetaminophen (TYLENOL) tablet 650 mg, 650 mg, Oral, Q4H PRN **OR** acetaminophen (TYLENOL) 160 MG/5ML oral solution 650 mg, 650 mg, Oral, Q4H PRN **OR** acetaminophen (TYLENOL) suppository 650 mg, 650 mg, Rectal, Q4H PRN, Beryl Hall APRN    apixaban (ELIQUIS)  tablet 5 mg, 5 mg, Oral, BID, Ravinder Halla W, APRN, 5 mg at 01/15/24 1243    sennosides-docusate (PERICOLACE) 8.6-50 MG per tablet 2 tablet, 2 tablet, Oral, BID **AND** polyethylene glycol (MIRALAX) packet 17 g, 17 g, Oral, Daily PRN **AND** bisacodyl (DULCOLAX) EC tablet 5 mg, 5 mg, Oral, Daily PRN **AND** bisacodyl (DULCOLAX) suppository 10 mg, 10 mg, Rectal, Daily PRN, Beryl Hall, APRN    erythromycin (ROMYCIN) ophthalmic ointment, , Right Eye, Q12H, Beryl Hall, APRN    famotidine (PEPCID) tablet 40 mg, 40 mg, Oral, Daily, Ravinder Halla W, APRN, 40 mg at 01/15/24 1243    finasteride (PROSCAR) tablet 5 mg, 5 mg, Oral, Daily, Ravinder Halla W, APRN, 5 mg at 01/15/24 1243    gabapentin (NEURONTIN) capsule 100 mg, 100 mg, Oral, TID PRN, Beryl Hall, APRN    lacosamide (VIMPAT) tablet 100 mg, 100 mg, Oral, Daily, Ravinder Halla W, APRN, 100 mg at 01/15/24 1243    lactated ringers infusion, 125 mL/hr, Intravenous, Continuous, Beryl Hall APRN, Last Rate: 125 mL/hr at 01/15/24 0911, 125 mL/hr at 01/15/24 0911    losartan (COZAAR) tablet 25 mg, 25 mg, Oral, Daily, Beryl Hall W, APRN, 25 mg at 01/15/24 1243    melatonin tablet 5 mg, 5 mg, Oral, Nightly PRN, Beryl Hall W, APRN    oxybutynin XL (DITROPAN-XL) 24 hr tablet 5 mg, 5 mg, Oral, Daily, Ravinder Halla W, APRN, 5 mg at 01/15/24 1243    pantoprazole (PROTONIX) EC tablet 40 mg, 40 mg, Oral, Q AM, Beryl Hall, APRN    PHENobarbital (LUMINAL) tablet 32.4 mg, 32.4 mg, Oral, Nightly, Beryl Hall, APRN    piperacillin-tazobactam (ZOSYN) 3.375 g in iso-osmotic dextrose 50 ml (premix), 3.375 g, Intravenous, Q8H, Beryl Hall APRN, 3.375 g at 01/15/24 1303    pramipexole (MIRAPEX) tablet 1.5 mg, 1.5 mg, Oral, BID, Beryl Hall, APRN, 1.5 mg at 01/15/24 1304    sodium chloride 0.9 % flush 10 mL, 10 mL, Intravenous, PRN, Starla, Javi GU MD    sodium chloride  0.9 % flush 10 mL, 10 mL, Intravenous, Q12H, Hall, Beryl W, APRN    sodium chloride 0.9 % flush 10 mL, 10 mL, Intravenous, PRN, Hall, Beryl W, APRN    sodium chloride 0.9 % infusion 40 mL, 40 mL, Intravenous, PRN, Hall, Beryl W, APRN    topiramate (TOPAMAX) tablet 200 mg, 200 mg, Oral, Daily, Hall, Beryl W, APRN, 200 mg at 01/15/24 1243     Allergies:  Allergies   Allergen Reactions    Acyclovir GI Intolerance    Atorvastatin Myalgia    Fenofibrate Myalgia    Metformin Unknown - High Severity     Other reaction(s): Unknown      Mirabegron Unknown - High Severity     Other reaction(s): Unknown         Objective     Physical Exam  Vitals and nursing note reviewed.   Constitutional:       General: He is not in acute distress.     Appearance: He is not ill-appearing.   Eyes:      Extraocular Movements: Extraocular movements intact.      Pupils: Pupils are equal, round, and reactive to light.      Comments: No nystagmus or deviated gaze noted   Neurological:      Mental Status: He is alert. Mental status is at baseline.      Cranial Nerves: Cranial nerves 2-12 are intact.      Sensory: Sensation is intact.      Motor: Motor function is intact. No weakness or seizure activity.      Coordination: Finger-Nose-Finger Test normal.      Deep Tendon Reflexes:      Reflex Scores:       Bicep reflexes are 1+ on the right side and 1+ on the left side.       Patellar reflexes are 1+ on the right side and 1+ on the left side.     Comments:     Cranial Nerves   CN II: Pupils are equal, round, and reactive to light. Normal visual acuity and visual fields.    CN III IV VI: Extraocular movements are full without nystagmus.  CN V: Normal facial sensation and strength of muscles of mastication.  CN VII: Facial movements are symmetric. No weakness.  CN VIII:  Auditory acuity is normal.  CN IX & X:  Symmetric palatal movement.  CN XI: Sternocleidomastoid and trapezius are normal.  No weakness.  CN XII: The tongue  "is midline.  No atrophy or fasciculations.    Reflexes: DTRs were difficult to elicit due to patient cooperation         Vitals:  Temp:  [97.3 °F (36.3 °C)-98.3 °F (36.8 °C)] 97.3 °F (36.3 °C)  Heart Rate:  [50-70] 52  Resp:  [18-20] 20  BP: ()/() 139/65    Laboratory Results:   Lab Results   Component Value Date    GLUCOSE 82 01/15/2024    CALCIUM 8.9 01/15/2024     01/15/2024    K 3.8 01/15/2024    CO2 24.0 01/15/2024     (H) 01/15/2024    BUN 22 01/15/2024    CREATININE 1.11 01/15/2024    BCR 19.8 01/15/2024    ANIONGAP 9.0 01/15/2024     Lab Results   Component Value Date    WBC 5.05 01/15/2024    HGB 12.7 (L) 01/15/2024    HCT 39.5 01/15/2024    MCV 96.3 01/15/2024     01/15/2024     No results found for: \"CHOL\"  No results found for: \"HDL\"  No results found for: \"LDL\"  No results found for: \"TRIG\"  No results found for: \"HGBA1C\"  Lab Results   Component Value Date    INR 1.08 12/30/2023    PROTIME 11.4 12/30/2023     No results found for: \"[id number]\"  No results found for: \"FOLATE\"    MRI Brain Without Contrast    Result Date: 1/15/2024  MRI BRAIN WO CONTRAST Date of Exam: 1/15/2024 4:10 AM EST Indication: AMS, seizure.  Comparison: CT scan of the head dated January 14, 2024 Technique:  Routine multiplanar/multisequence sequence images of the brain were obtained without contrast administration. Findings: There is diffuse generalized atrophy. There are areas of increased T2 and FLAIR signal throughout the bilateral periventricular and subcortical white matter consistent with chronic microvascular ischemia. The diffusion weighted sequences are normal. The major intracranial flow voids are preserved. There are no air-fluid levels in the sinuses to indicate acute sinusitis.     Impression: Impression: Atrophy and chronic microvascular ischemic change. No convincing acute intracranial process. Electronically Signed: Christopher Collins MD  1/15/2024 5:13 AM EST  Workstation ID: [id number]  "     Assessment / Plan     Brief Patient Summary:  Caesar Kim is a 83 y.o. male with past medical history of HTN, A-fib, HLD, BPH, dementia and GERD who presented to [facility] ED via EMS for increasing confusion.     EEG on 1/15/2024 showed nonspecific diffuse cerebral dysfunction of mild degree.  No ongoing seizure-like activity or focal features noted.    MRI brain without contrast showed no acute intracranial process.  Was positive for generalized atrophy along with chronic microvascular ischemic changes.    Plan:   History of seizure disorder  Altered mental status  Differentials include breakthrough seizure, infection, sleep deprivation, drug interaction.  Would continue to hold ciprofloxacin as it could lower seizure threshold along with increasing altered mental status.  Continue antibiotic regimen per hospitalist team.  Continue home Topamax 200 mg daily  Continue Vimpat 100 mg daily  Increase phenobarbital to original home dose of 64.8 mg daily  Recheck phenobarbital level in a.m.  Continue seizure and fall precautions  Melatonin 5 mg nightly  Patient work with PT/OT  Neurochecks every 4 hours  General neurology will continue to follow    I have discussed the above with the patient, bedside RN, patient's wife and son and Dr. Vides  Time spent with patient: 80 minutes in face-to-face evaluation and management of the patient.       HEIDI Veliz

## 2024-01-15 NOTE — THERAPY EVALUATION
Patient Name: Caesar Kim  : 1940    MRN: [id number]                              Today's Date: 1/15/2024       Admit Date: 2024    Visit Dx:     ICD-10-CM ICD-9-CM   1. Altered mental status, unspecified altered mental status type  R41.82 780.97   2. Acute UTI (urinary tract infection)  N39.0 599.0   3. Elevated CK  R74.8 790.5   4. Encephalopathy, unspecified  G93.40 348.30   5. Essential hypertension  I10 401.9     Patient Active Problem List   Diagnosis    Urinary tract infection    Muscle weakness    Encephalopathy, unspecified    Fall    Right hip pain    Essential hypertension    Atrial fibrillation    HLD (hyperlipidemia)    Seizure    BPH (benign prostatic hyperplasia)    GERD without esophagitis    Complicated UTI (urinary tract infection)     Past Medical History:   Diagnosis Date    AAA (abdominal aortic aneurysm)     Arthritis     Atrial fibrillation     BPH (benign prostatic hyperplasia)     CHF (congestive heart failure)     COPD (chronic obstructive pulmonary disease)     Diabetes mellitus     Elevated cholesterol     GERD (gastroesophageal reflux disease)     Hypertension     Memory loss     Personal history of pulmonary embolism     Pulmonary embolism     Restless leg syndrome     Sleep apnea     Uses CPAP    Unspecified convulsions     Unspecified diastolic (congestive) heart failure     Urinary tract infection      Past Surgical History:   Procedure Laterality Date    CHOLECYSTECTOMY      COLONOSCOPY      CYSTOSCOPY TRANSURETHRAL RESECTION OF PROSTATE N/A 2022    Procedure: CYSTOSCOPY TRANSURETHRAL RESECTION OF PROSTATE GREENLIGHT;  Surgeon: Darius Costa MD;  Location: [facility];  Service: Urology;  Laterality: N/A;    ENDOSCOPY      EYE SURGERY Bilateral     CATARACTS    SHOULDER SURGERY Left     SKIN BIOPSY      NECK    TONSILLECTOMY      AND ADENOIDS      General Information       Row Name 01/15/24 1511          Physical Therapy Time and Intention    Document Type  evaluation  -MB     Mode of Treatment physical therapy  -MB       Row Name 01/15/24 1511          General Information    Patient Profile Reviewed yes  -MB     Prior Level of Function --  TBD. Pt. limited historian.  -MB     Existing Precautions/Restrictions fall;other (see comments)  confusion  -MB     Barriers to Rehab medically complex;previous functional deficit;cognitive status  -MB       Row Name 01/15/24 1511          Living Environment    People in Home spouse  -MB       Row Name 01/15/24 1511          Home Main Entrance    Number of Stairs, Main Entrance two  per patient  -MB     Stair Railings, Main Entrance other (see comments)  TBD. Pt. u/a to provide home infor.  -MB       Row Name 01/15/24 1511          Cognition    Orientation Status (Cognition) oriented to;person;verbal cues/prompts needed for orientation;place;situation;time  -MB       Row Name 01/15/24 1511          Safety Issues, Functional Mobility    Safety Issues Affecting Function (Mobility) ability to follow commands;awareness of need for assistance;insight into deficits/self-awareness;judgment;positioning of assistive device;problem-solving;safety precaution awareness;safety precautions follow-through/compliance;sequencing abilities  -MB     Impairments Affecting Function (Mobility) balance;cognition;coordination;endurance/activity tolerance;range of motion (ROM);strength  -MB               User Key  (r) = Recorded By, (t) = Taken By, (c) = Cosigned By      Initials Name Provider Type    Yolette Burns, PT Physical Therapist                   Mobility       Row Name 01/15/24 1006          Bed Mobility    Bed Mobility supine-sit;sit-supine  -MB     Supine-Sit [place] (Bed Mobility) moderate assist (50% patient effort);verbal cues;nonverbal cues (demo/gesture)  -MB     Sit-Supine [place] (Bed Mobility) moderate assist (50% patient effort);verbal cues;nonverbal cues (demo/gesture)  -MB     Assistive Device (Bed Mobility) bed  rails;draw sheet;head of bed elevated  -MB     Comment, (Bed Mobility) Pt. required assist to manage BLEs, support trunk, and scoot hips w/ increased time to complete.  -MB       Row Name 01/15/24 6633          Transfers    Comment, (Transfers) STS x 2 reps from EOB w/ consistent cueing for sequencing and upright posture in standing. Pt. demo posterior lean in standing requiring consistent cueing/assist to shift weight anteriorly over SHANELLE.  -MB       Row Name 01/15/24 1553          Sit-Stand Transfer    Sit-Stand [place] (Transfers) moderate assist (50% patient effort);verbal cues;nonverbal cues (demo/gesture)  -MB     Assistive Device (Sit-Stand Transfers) walker, front-wheeled  -MB       Row Name 01/15/24 9427          Gait/Stairs (Locomotion)    [place] Level (Gait) moderate assist (50% patient effort);verbal cues;nonverbal cues (demo/gesture)  -MB     Assistive Device (Gait) walker, front-wheeled  -MB     Distance in Feet (Gait) 2  -MB     Deviations/Abnormal Patterns (Gait) base of support, wide;[place]a decreased;gait speed decreased;stride length decreased;weight shifting decreased  -MB     Bilateral Gait Deviations forward flexed posture;heel strike decreased  -MB     Comment, (Gait/Stairs) Pt. side-stepped to HOB w/ VCs for step sequence, upright posture, and assist manage RW. Gait distance limited by impaired balance/posterior lean in standing.  -MB               User Key  (r) = Recorded By, (t) = Taken By, (c) = Cosigned By      Initials Name Provider Type    Yolette Burns, PT Physical Therapist                   Obj/Interventions       Row Name 01/15/24 9605          Range of Motion Comprehensive    General Range of Motion upper extremity range of motion deficits identified;lower extremity range of motion deficits identified  -MB     Comment, General Range of Motion limited B shoulder flexion and B knee ext  -MB       Row Name 01/15/24 6354          Strength Comprehensive (MMT)     General Manual Muscle Testing (MMT) Assessment upper extremity strength deficits identified;lower extremity strength deficits identified  -MB     Comment, General Manual Muscle Testing (MMT) Assessment Difficult to formally assess d/t cognitive status.  -MB       Row Name 01/15/24 1556          Balance    Balance Assessment sitting static balance;standing static balance  -MB     Static Sitting Balance minimal assist  -MB     Position, Sitting Balance unsupported;sitting edge of bed  -MB     Static Standing Balance moderate assist  -MB     Position/Device Used, Standing Balance supported;walker, front-wheeled  -MB     Balance Interventions standing;sit to stand;weight shifting activity;occupation based/functional task  -MB       Row Name 01/15/24 1556          Sensory Assessment (Somatosensory)    Sensory Assessment (Somatosensory) unable/difficult to assess  -MB               User Key  (r) = Recorded By, (t) = Taken By, (c) = Cosigned By      Initials Name Provider Type    Yolette Burns, PT Physical Therapist                   Goals/Plan       Row Name 01/15/24 1600          Bed Mobility Goal 1 (PT)    Activity/Assistive Device (Bed Mobility Goal 1, PT) sit to supine/supine to sit  -MB     [place] Level/Cues Needed (Bed Mobility Goal 1, PT) minimum assist (75% or more patient effort)  -MB     Time Frame (Bed Mobility Goal 1, PT) 10 days  -MB       Row Name 01/15/24 1600          Transfer Goal 1 (PT)    Activity/Assistive Device (Transfer Goal 1, PT) sit-to-stand/stand-to-sit;bed-to-chair/chair-to-bed;walker, rolling  -MB     [place] Level/Cues Needed (Transfer Goal 1, PT) minimum assist (75% or more patient effort)  -MB     Time Frame (Transfer Goal 1, PT) 10 days  -MB       Row Name 01/15/24 1600          Gait Training Goal 1 (PT)    Activity/Assistive Device (Gait Training Goal 1, PT) gait (walking locomotion);walker, rolling  -MB     [place] Level (Gait Training Goal 1, PT) minimum assist  (75% or more patient effort)  -MB     Distance (Gait Training Goal 1, PT) 50  -MB     Time Frame (Gait Training Goal 1, PT) 2 weeks  -MB       Row Name 01/15/24 1600          Therapy Assessment/Plan (PT)    Planned Therapy Interventions (PT) balance training;bed mobility training;gait training;home exercise program;patient/family education;postural re-education;strengthening;transfer training  -MB               User Key  (r) = Recorded By, (t) = Taken By, (c) = Cosigned By      Initials Name Provider Type    Yolette Burns, PT Physical Therapist                   Clinical Impression       Row Name 01/15/24 2157          Pain    Pretreatment Pain Rating 0/10 - no pain  -MB     Posttreatment Pain Rating 0/10 - no pain  -MB       Row Name 01/15/24 4837          Plan of Care Review    Plan of Care Reviewed With patient  -MB     Progress no change  -MB     Outcome Evaluation PT eval completed. Patient presents w/ decreased strength, balance deficits, gait instability, decreased postural control, decreased safety awareness, and is below his baseline. He required mod A for bed mobility, transfers, and ambulating 2ft w/ RW. Skilled IPPT warranted to improve pt's safety and independence w/ functional mobility. Recommend SNF rehab at D/C.  -MB       Row Name 01/15/24 2918          Therapy Assessment/Plan (PT)    Patient/Family Therapy Goals Statement (PT) Pt. u/a to state.  -MB     Rehab Potential (PT) fair, will monitor progress closely  -MB     Criteria for Skilled Interventions Met (PT) yes;meets criteria;skilled treatment is necessary  -MB     Therapy Frequency (PT) daily  -MB       Row Name 01/15/24 1936          Positioning and Restraints    Pre-Treatment Position in bed  -MB     Post Treatment Position bed  -MB     In Bed notified nsg;supine;call light within reach;encouraged to call for assist;exit alarm on;side rails up x3;heels elevated  -MB               User Key  (r) = Recorded By, (t) = Taken By, (c) =  Cosigned By      Initials Name Provider Type    Yolette Burns, PT Physical Therapist                   Outcome Measures       Row Name 01/15/24 1600 01/15/24 0917       How much help from another person do you currently need...    Turning from your back to your side while in flat bed without using bedrails? 2  -MB 3  -TK    Moving from lying on back to sitting on the side of a flat bed without bedrails? 2  -MB 2  -TK    Moving to and from a bed to a chair (including a wheelchair)? 2  -MB 2  -TK    Standing up from a chair using your arms (e.g., wheelchair, bedside chair)? 2  -MB 2  -TK    Climbing 3-5 steps with a railing? 1  -MB 1  -TK    To walk in hospital room? 1  -MB 1  -TK    AM-PAC 6 Clicks Score (PT) 10  -MB 11  -TK    Highest Level of Mobility Goal 4 --> Transfer to chair/commode  -MB 4 --> Transfer to chair/commode  -TK      Row Name 01/15/24 1600          Functional Assessment    Outcome Measure Options AM-PAC 6 Clicks Basic Mobility (PT)  -MB               User Key  (r) = Recorded By, (t) = Taken By, (c) = Cosigned By      Initials Name Provider Type    Yolette Burns, PT Physical Therapist    TK Jayson Robert RN Registered Nurse                                 Physical Therapy Education       Title: PT OT SLP Therapies (In Progress)       Topic: Physical Therapy (In Progress)       Point: Mobility training (In Progress)       Learning Progress Summary             Patient Acceptance, E,D, NR by MB at 1/15/2024 1600                         Point: Home exercise program (In Progress)       Learning Progress Summary             Patient Acceptance, E,D, NR by MB at 1/15/2024 1600                         Point: Body mechanics (In Progress)       Learning Progress Summary             Patient Acceptance, E,D, NR by MB at 1/15/2024 1600                         Point: Precautions (In Progress)       Learning Progress Summary             Patient Acceptance, E,D, NR by MB at 1/15/2024 1600                                          User Key       Initials Effective Dates Name Provider Type Discipline    MB 06/16/21 -  Yolette Wheeler, PT Physical Therapist PT                  PT Recommendation and Plan  Planned Therapy Interventions (PT): balance training, bed mobility training, gait training, home exercise program, patient/family education, postural re-education, strengthening, transfer training  Plan of Care Reviewed With: patient  Progress: no change  Outcome Evaluation: PT eval completed. Patient presents w/ decreased strength, balance deficits, gait instability, decreased postural control, decreased safety awareness, and is below his baseline. He required mod A for bed mobility, transfers, and ambulating 2ft w/ RW. Skilled IPPT warranted to improve pt's safety and independence w/ functional mobility. Recommend SNF rehab at D/C.     Time Calculation:   PT Evaluation Complexity  History, PT Evaluation Complexity: 1-2 personal factors and/or comorbidities  Examination of Body Systems (PT Eval Complexity): total of 3 or more elements  Clinical Presentation (PT Evaluation Complexity): evolving  Clinical Decision Making (PT Evaluation Complexity): moderate complexity  Overall Complexity (PT Evaluation Complexity): moderate complexity     PT Charges       Row Name 01/15/24 1601             Time Calculation    Start Time 1511  -MB      PT Received On 01/15/24  -MB      PT Goal Re-Cert Due Date 01/25/24  -MB         Timed Charges    11571 - PT Therapeutic Activity Minutes 10  -MB         Untimed Charges    PT Eval/Re-eval Minutes 46  -MB         Total Minutes    Timed Charges Total Minutes 10  -MB      Untimed Charges Total Minutes 46  -MB       Total Minutes 56  -MB                User Key  (r) = Recorded By, (t) = Taken By, (c) = Cosigned By      Initials Name Provider Type    Yolette Burns, PT Physical Therapist                  Therapy Charges for Today       Code Description Service Date  Service Provider Modifiers Qty    91348188658 HC PT THERAPEUTIC ACT EA 15 MIN 1/15/2024 Yolette Wheeler, PT GP 1    36676648818 HC PT EVAL MOD COMPLEXITY 4 1/15/2024 Yolette Wheeler, PT GP 1            PT G-Codes  Outcome Measure Options: AM-PAC 6 Clicks Basic Mobility (PT)  AM-PAC 6 Clicks Score (PT): 10  PT Discharge Summary  Anticipated Discharge Disposition (PT): skilled nursing facility    Yolette Wheeler, PT  1/15/2024

## 2024-01-16 LAB
ANION GAP SERPL CALCULATED.3IONS-SCNC: 9 MMOL/L (ref 5–15)
BACTERIA SPEC AEROBE CULT: NO GROWTH
BUN SERPL-MCNC: 18 MG/DL (ref 8–23)
BUN/CREAT SERPL: 19.1 (ref 7–25)
CALCIUM SPEC-SCNC: 8.9 MG/DL (ref 8.6–10.5)
CHLORIDE SERPL-SCNC: 109 MMOL/L (ref 98–107)
CO2 SERPL-SCNC: 23 MMOL/L (ref 22–29)
CREAT SERPL-MCNC: 0.94 MG/DL (ref 0.76–1.27)
DEPRECATED RDW RBC AUTO: 53.7 FL (ref 37–54)
EGFRCR SERPLBLD CKD-EPI 2021: 80.4 ML/MIN/1.73
ERYTHROCYTE [DISTWIDTH] IN BLOOD BY AUTOMATED COUNT: 15.1 % (ref 12.3–15.4)
GLUCOSE SERPL-MCNC: 94 MG/DL (ref 65–99)
HCT VFR BLD AUTO: 37 % (ref 37.5–51)
HGB BLD-MCNC: 12 G/DL (ref 13–17.7)
MCH RBC QN AUTO: 31.3 PG (ref 26.6–33)
MCHC RBC AUTO-ENTMCNC: 32.4 G/DL (ref 31.5–35.7)
MCV RBC AUTO: 96.4 FL (ref 79–97)
PHENOBARB SERPL-MCNC: 4.8 MCG/ML (ref 10–30)
PHENYTOIN SERPL-MCNC: <0.8 MCG/ML (ref 10–20)
PLATELET # BLD AUTO: 167 10*3/MM3 (ref 140–450)
PMV BLD AUTO: 10.1 FL (ref 6–12)
POTASSIUM SERPL-SCNC: 3.7 MMOL/L (ref 3.5–5.2)
RBC # BLD AUTO: 3.84 10*6/MM3 (ref 4.14–5.8)
SODIUM SERPL-SCNC: 141 MMOL/L (ref 136–145)
WBC NRBC COR # BLD AUTO: 5.28 10*3/MM3 (ref 3.4–10.8)

## 2024-01-16 PROCEDURE — 97166 OT EVAL MOD COMPLEX 45 MIN: CPT

## 2024-01-16 PROCEDURE — 85027 COMPLETE CBC AUTOMATED: CPT | Performed by: INTERNAL MEDICINE

## 2024-01-16 PROCEDURE — 80048 BASIC METABOLIC PNL TOTAL CA: CPT | Performed by: INTERNAL MEDICINE

## 2024-01-16 PROCEDURE — 99232 SBSQ HOSP IP/OBS MODERATE 35: CPT | Performed by: INTERNAL MEDICINE

## 2024-01-16 PROCEDURE — 99232 SBSQ HOSP IP/OBS MODERATE 35: CPT

## 2024-01-16 PROCEDURE — 80185 ASSAY OF PHENYTOIN TOTAL: CPT

## 2024-01-16 PROCEDURE — 97535 SELF CARE MNGMENT TRAINING: CPT

## 2024-01-16 PROCEDURE — 25010000002 PIPERACILLIN SOD-TAZOBACTAM PER 1 G

## 2024-01-16 PROCEDURE — 80184 ASSAY OF PHENOBARBITAL: CPT

## 2024-01-16 PROCEDURE — 97530 THERAPEUTIC ACTIVITIES: CPT

## 2024-01-16 RX ORDER — POLYMYXIN B SULFATE AND TRIMETHOPRIM 1; 10000 MG/ML; [USP'U]/ML
1 SOLUTION OPHTHALMIC 4 TIMES DAILY
Status: DISCONTINUED | OUTPATIENT
Start: 2024-01-16 | End: 2024-01-19 | Stop reason: HOSPADM

## 2024-01-16 RX ADMIN — Medication 10 ML: at 08:40

## 2024-01-16 RX ADMIN — OXYBUTYNIN CHLORIDE 5 MG: 5 TABLET, EXTENDED RELEASE ORAL at 08:40

## 2024-01-16 RX ADMIN — PIPERACILLIN SODIUM AND TAZOBACTAM SODIUM 3.38 G: 3; .375 INJECTION, SOLUTION INTRAVENOUS at 06:11

## 2024-01-16 RX ADMIN — PHENOBARBITAL 64.8 MG: 64.8 TABLET ORAL at 22:11

## 2024-01-16 RX ADMIN — APIXABAN 5 MG: 5 TABLET, FILM COATED ORAL at 22:11

## 2024-01-16 RX ADMIN — GABAPENTIN 100 MG: 100 CAPSULE ORAL at 22:11

## 2024-01-16 RX ADMIN — Medication 10 ML: at 22:12

## 2024-01-16 RX ADMIN — LACOSAMIDE 100 MG: 100 TABLET, FILM COATED ORAL at 08:39

## 2024-01-16 RX ADMIN — APIXABAN 5 MG: 5 TABLET, FILM COATED ORAL at 08:39

## 2024-01-16 RX ADMIN — FAMOTIDINE 40 MG: 20 TABLET, FILM COATED ORAL at 08:40

## 2024-01-16 RX ADMIN — PANTOPRAZOLE SODIUM 40 MG: 40 TABLET, DELAYED RELEASE ORAL at 06:11

## 2024-01-16 RX ADMIN — POLYMYXIN B SULFATE AND TRIMETHOPRIM 1 DROP: 10000; 1 SOLUTION OPHTHALMIC at 22:11

## 2024-01-16 RX ADMIN — SENNOSIDES AND DOCUSATE SODIUM 2 TABLET: 8.6; 5 TABLET ORAL at 08:40

## 2024-01-16 RX ADMIN — PRAMIPEXOLE DIHYDROCHLORIDE 1.5 MG: 1 TABLET ORAL at 22:10

## 2024-01-16 RX ADMIN — Medication 5 MG: at 22:10

## 2024-01-16 RX ADMIN — POLYMYXIN B SULFATE AND TRIMETHOPRIM 1 DROP: 10000; 1 SOLUTION OPHTHALMIC at 17:14

## 2024-01-16 RX ADMIN — TOPIRAMATE 200 MG: 100 TABLET, FILM COATED ORAL at 22:11

## 2024-01-16 RX ADMIN — FINASTERIDE 5 MG: 5 TABLET, FILM COATED ORAL at 08:39

## 2024-01-16 RX ADMIN — LOSARTAN POTASSIUM 25 MG: 25 TABLET, FILM COATED ORAL at 08:40

## 2024-01-16 RX ADMIN — POLYMYXIN B SULFATE AND TRIMETHOPRIM 1 DROP: 10000; 1 SOLUTION OPHTHALMIC at 13:54

## 2024-01-16 RX ADMIN — PRAMIPEXOLE DIHYDROCHLORIDE 1.5 MG: 1 TABLET ORAL at 08:40

## 2024-01-16 NOTE — PROGRESS NOTES
[facility] Medicine Services  PROGRESS NOTE    Patient Name: Caesar Kim  : 1940  MRN: [id number]    Date of Admission: 2024  Primary Care Physician: Provider, No Known    Subjective   Subjective     CC:  Breakthrough sz f/u    HPI:  Still confused but better than yesterday per wife. Issues finding his words but eventually finds them and is fully oriented      Objective   Objective     Vital Signs:   Temp:  [96.9 °F (36.1 °C)-98 °F (36.7 °C)] 98 °F (36.7 °C)  Heart Rate:  [51-60] 52  Resp:  [18-19] 18  BP: ()/(53-69) 98/62     Physical Exam:  Constitutional: No acute distress, awake, alert male sitting up in bed, seizure protections in place  HENT: NCAT, mucous membranes moist  Respiratory: Clear to auscultation bilaterally, respiratory effort normal   Cardiovascular: RRR, no murmurs, rubs, or gallops  Gastrointestinal: Soft, nontender, nondistended  Musculoskeletal: Muscle tone within normal limits, no joint effusions appreciated  Psychiatric: Appropriate affect, cooperative  Neurologic: Alert and oriented, word finding diff and pauses, facial movements symmetric and spontaneous movement of all 4 extremities grossly equal bilaterally, speech clear  Skin: No rashes    Results Reviewed:  LAB RESULTS:      Lab 01/16/24  0421 01/15/24  0524  2211   WBC 5.28 5.05 6.74   HEMOGLOBIN 12.0* 12.7* 15.0   HEMATOCRIT 37.0* 39.5 46.1   PLATELETS 167 183 224   NEUTROS ABS  --  2.65 4.69   IMMATURE GRANS (ABS)  --  0.01 0.02   LYMPHS ABS  --  1.22 1.04   MONOS ABS  --  0.95* 0.85   EOS ABS  --  0.16 0.06   MCV 96.4 96.3 95.6   CRP  --   --  1.13*   PROCALCITONIN  --   --  0.15   LACTATE  --   --  1.4         Lab 01/16/24  0421 01/15/24  0528 24  2211   SODIUM 141 141 140   POTASSIUM 3.7 3.8 4.2   CHLORIDE 109* 108* 104   CO2 23.0 24.0 25.0   ANION GAP 9.0 9.0 11.0   BUN 18 22 23   CREATININE 0.94 1.11 1.13   EGFR 80.4 65.9 64.5   GLUCOSE 94 82 99   CALCIUM 8.9 8.9 9.6    MAGNESIUM  --   --  2.4   PHOSPHORUS  --   --  4.2   TSH  --   --  5.000*         Lab 01/14/24 2211   TOTAL PROTEIN 7.9   ALBUMIN 4.2   GLOBULIN 3.7   ALT (SGPT) 16   AST (SGOT) 27   BILIRUBIN 0.7   ALK PHOS 53   LIPASE 38         Lab 01/15/24  0528 01/15/24  0210 01/14/24 2211   HSTROP T 30* 32* 30*                 Brief Urine Lab Results  (Last result in the past 365 days)        Color   Clarity   Blood   Leuk Est   Nitrite   Protein   CREAT   Urine HCG        01/14/24 2233 Yellow   Turbid   Small (1+)   Large (3+)   Negative   30 mg/dL (1+)                   Microbiology Results Abnormal       Procedure Component Value - Date/Time    Urine Culture - Urine, Urine, Clean Catch [[id number]]  (Normal) Collected: 01/14/24 2233    Lab Status: Final result Specimen: Urine, Clean Catch Updated: 01/16/24 0944     Urine Culture No growth    Blood Culture - Blood, Hand, Right [[id number]]  (Normal) Collected: 01/14/24 2152    Lab Status: Preliminary result Specimen: Blood from Hand, Right Updated: 01/16/24 0246     Blood Culture No growth at 24 hours    Blood Culture - Blood, Arm, Right [[id number]]  (Normal) Collected: 01/14/24 2211    Lab Status: Preliminary result Specimen: Blood from Arm, Right Updated: 01/16/24 0246     Blood Culture No growth at 24 hours    COVID PRE-OP / PRE-PROCEDURE SCREENING ORDER (NO ISOLATION) - Swab, Nasopharynx [[id number]]  (Normal) Collected: 01/14/24 2159    Lab Status: Final result Specimen: Swab from Nasopharynx Updated: 01/14/24 2322    Narrative:      The following orders were created for panel order COVID PRE-OP / PRE-PROCEDURE SCREENING ORDER (NO ISOLATION) - Swab, Nasopharynx.  Procedure                               Abnormality         Status                     ---------                               -----------         ------                     Respiratory Panel PCR w/...[[id number]]  Normal              Final result                 Please view results for these tests on the  individual orders.    Respiratory Panel PCR w/COVID-19(SARS-CoV-2) BRENNAN/MARIA TERESA/DARINEL/PAD/COR/DANIELLE In-House, NP Swab in UTM/VTM, 2 HR TAT - Swab, Nasopharynx [[id number]]  (Normal) Collected: 01/14/24 2159    Lab Status: Final result Specimen: Swab from Nasopharynx Updated: 01/14/24 2322     ADENOVIRUS, PCR Not Detected     Coronavirus 229E Not Detected     Coronavirus HKU1 Not Detected     Coronavirus NL63 Not Detected     Coronavirus OC43 Not Detected     COVID19 Not Detected     Human Metapneumovirus Not Detected     Human Rhinovirus/Enterovirus Not Detected     Influenza A PCR Not Detected     Influenza B PCR Not Detected     Parainfluenza Virus 1 Not Detected     Parainfluenza Virus 2 Not Detected     Parainfluenza Virus 3 Not Detected     Parainfluenza Virus 4 Not Detected     RSV, PCR Not Detected     Bordetella pertussis pcr Not Detected     Bordetella parapertussis PCR Not Detected     Chlamydophila pneumoniae PCR Not Detected     Mycoplasma pneumo by PCR Not Detected    Narrative:      In the setting of a positive respiratory panel with a viral infection PLUS a negative procalcitonin without other underlying concern for bacterial infection, consider observing off antibiotics or discontinuation of antibiotics and continue supportive care. If the respiratory panel is positive for atypical bacterial infection (Bordetella pertussis, Chlamydophila pneumoniae, or Mycoplasma pneumoniae), consider antibiotic de-escalation to target atypical bacterial infection.            EEG    Result Date: 1/15/2024  Reason for referral: 83 y.o.male with seizure, altered mental status Technical Summary:  A 19 channel digital EEG was performed using the international 10-20 placement system, including eye leads and EKG leads. Duration: 20 minutes Findings: The patient is drowsy.  The background shows diffuse medium amplitude 5-6 Hz theta which is present symmetrically over both hemispheres.  A clear posterior rhythm is not seen.  EMG  artifact is variably prominent.  Light sleep is seen with mild slowing of the tracing.  Photic stimulation does not change the background.  Hyperventilation is not performed.  No focal features or epileptiform activity are seen. Video: Available Technical quality: Superior EKG: Irregular, 50 to 60 bpm SUMMARY: Mild generalized slow No focal features or epileptiform activity are seen     Impression: Diffuse cerebral dysfunction of mild degree, nonspecific No ongoing seizures are seen This report is transcribed using the Dragon dictation system.      MRI Brain Without Contrast    Result Date: 1/15/2024  MRI BRAIN WO CONTRAST Date of Exam: 1/15/2024 4:10 AM EST Indication: AMS, seizure.  Comparison: CT scan of the head dated January 14, 2024 Technique:  Routine multiplanar/multisequence sequence images of the brain were obtained without contrast administration. Findings: There is diffuse generalized atrophy. There are areas of increased T2 and FLAIR signal throughout the bilateral periventricular and subcortical white matter consistent with chronic microvascular ischemia. The diffusion weighted sequences are normal. The major intracranial flow voids are preserved. There are no air-fluid levels in the sinuses to indicate acute sinusitis.     Impression: Impression: Atrophy and chronic microvascular ischemic change. No convincing acute intracranial process. Electronically Signed: Christopher Collins MD  1/15/2024 5:13 AM EST  Workstation ID: [id number]    XR Abdomen KUB    Result Date: 1/15/2024  XR ABDOMEN KUB Date of Exam: 1/15/2024 2:10 AM EST Indication: mri clearance Comparison: None available. 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 The gas pattern is nonspecific. There are clips from cholecystectomy. Review of the chest radiograph demonstrates no evidence of metallic foreign body, spinal stimulator or cardiac pacer device. Review of the patient's head CT fails to demonstrate metal in the eyes or evidence  of cochlear implant.     Impression: Impression: The patient is cleared for MRI as detailed. Electronically Signed: Christopher Collins MD  1/15/2024 2:39 AM EST  Workstation ID: [id number]    CT Head Without Contrast    Result Date: 1/14/2024  CT HEAD WO CONTRAST Date of Exam: 1/14/2024 9:21 PM EST Indication: Altered mental status, suspect metabolic.. Comparison: CT head November 22, 2023 Technique: Axial CT images were obtained of the head without contrast administration.  Automated exposure control and iterative construction methods were used. Findings: There is moderate diffuse generalized atrophy. There are low-attenuation areas in the periventricular white matter consistent with chronic microvascular ischemic change. There is no mass, mass effect or midline shift. There are no abnormal extra-axial fluid collections or areas of acute hemorrhage. The paranasal sinuses are clear. The mastoid air cells are clear.     Impression: Impression: Moderate atrophy and chronic microvascular ischemia. No acute intracranial process. Electronically Signed: Christopher Collins MD  1/14/2024 9:51 PM EST  Workstation ID: [id number]    XR Chest 1 View    Result Date: 1/14/2024  XR CHEST 1 VW Date of Exam: 1/14/2024 8:40 PM EST Indication: Weak/Dizzy/AMS triage protocol Comparison: None available. Findings: The heart is enlarged. There is tortuosity of the thoracic aorta. Lung volumes are slightly decreased. The lungs are clear.     Impression: Impression: No active disease Electronically Signed: Christopher Collins MD  1/14/2024 9:02 PM EST  Workstation ID: [id number]         Current medications:  Scheduled Meds:apixaban, 5 mg, Oral, BID  famotidine, 40 mg, Oral, Daily  finasteride, 5 mg, Oral, Daily  lacosamide, 100 mg, Oral, Daily  melatonin, 5 mg, Oral, Nightly  oxybutynin XL, 5 mg, Oral, Daily  pantoprazole, 40 mg, Oral, Q AM  PHENobarbital, 64.8 mg, Oral, Nightly  pramipexole, 1.5 mg, Oral, BID  senna-docusate sodium, 2 tablet, Oral, BID  sodium  chloride, 10 mL, Intravenous, Q12H  topiramate, 200 mg, Oral, Daily  trimethoprim-polymyxin b, 1 drop, Right Eye, 4x Daily      Continuous Infusions:   PRN Meds:.  acetaminophen **OR** acetaminophen **OR** acetaminophen    senna-docusate sodium **AND** polyethylene glycol **AND** bisacodyl **AND** bisacodyl    diazePAM    gabapentin    sodium chloride    sodium chloride    Assessment & Plan   Assessment & Plan     Active Hospital Problems    Diagnosis  POA    **Complicated UTI (urinary tract infection) [N39.0]  Yes    HLD (hyperlipidemia) [E78.5]  Yes    Seizure [R56.9]  Yes    BPH (benign prostatic hyperplasia) [N40.0]  Yes    GERD without esophagitis [K21.9]  Yes    Essential hypertension [I10]  Yes    Atrial fibrillation [I48.91]  Yes    Urinary tract infection [N39.0]  Yes      Resolved Hospital Problems   No resolved problems to display.        Brief Hospital Course to date:  Caesar Kim is a 83 y.o. male w known seizure disorder on phenobarbitol (recently decreased 11/10/2023 after normal levels) who presented with confusion and breakthrough seizure in setting of dirty urinalysis.      Breakthrough seizure w known sz disorder  -reduced dose of phenobarbitol w breakthrough sz, cipro before admission also poss contributor  -increase to previous sz controlled dose of phenobarbitol per neurology. Will take time to become therapeutic    Confusion in setting of dirty u/a  -dirty u/a 1/11 at home health - urine culture negative  -dirty u/a 1/13 started on ciprofloaxin - no culture sent  -dirty u/a 1/14 - urine culture negative  -suspect confusion independent of true UTIs after this evaluation. D/c abx and monitor. Recommend OP neurology evaluation    Mild rhabdomyolysis - improved w IVF, encourage PO intake  Right eye conjunctivitis - eye drops w improvement  Debility - walks w walker and assist w transitions at baseline, current rec for [facility]    CHFpEF - without exacerbation  Relative hypotension - hold home losartan  for now  H/o PE, chronic atrial fibrillation - home eliquis  GERD - ppi  BPH - home proscar  BMI 30    Expected Discharge Location and Transportation: [facility]  Expected Discharge med ready likely 1/17 (Discharge date is tentative pending patient's medical condition and is subject to change)  Expected Discharge Date: 1/18/2024; Expected Discharge Time:      DVT prophylaxis:  Medical DVT prophylaxis orders are present.     AM-PAC 6 Clicks Score (PT): 10 (01/16/24 0800)    CODE STATUS:   Code Status and Medical Interventions:   Ordered at: 01/15/24 0218     Medical Intervention Limits:    NO intubation (DNI)     Level Of Support Discussed With:    Next of Kin (If No Surrogate)     Code Status (Patient has no pulse and is not breathing):    No CPR (Do Not Attempt to Resuscitate)     Medical Interventions (Patient has pulse or is breathing):    Limited Support     Comments:    Patient's wife reports having documentation, she reports she will provide a copy.       Aminah Vides MD  01/16/24

## 2024-01-16 NOTE — PLAN OF CARE
Goal Outcome Evaluation:  Plan of Care Reviewed With: patient        Progress: no change  Outcome Evaluation: VSS, on RA. No c/o pain. Pt A&OX 2. IVF and IV ABX given. Pt slept well. Will continue to monitor.

## 2024-01-16 NOTE — PLAN OF CARE
Goal Outcome Evaluation:Much clearer mentally today.  Able to answer most orientation questions correctly, time / date is transient. Feeds himself.  PT / OT worked to get him out of the bed today. Still only standing at the bedside with assist of two (2), gait belt and walker utilized.  Continue to monitor, possible discharge tomorrow.

## 2024-01-16 NOTE — PROGRESS NOTES
[facility] Neurology    Progress Note    Patient Name: Caesar Kim  : 1940  MRN: [id number]  Primary Care Physician:  Jonathan, No Known  Date of admission: 2024    Subjective     Chief Complaint: Altered mental status    History of Present Illness   Patient has a no seizure-like activity since hospitalization.  According to his wife which is bedside this is his baseline.  He remains slightly confused mostly to situation.  No acute events overnight.    Review of Systems   General: Negative for fever, nausea, or vomiting.   Neurological: Negative for headache, pain, or weakness.     Objective     Physical Exam  Vitals and nursing note reviewed.   Constitutional:       General: He is not in acute distress.     Appearance: He is not ill-appearing.   Eyes:      General:         Right eye: Discharge present.      Extraocular Movements: Extraocular movements intact.      Pupils: Pupils are equal, round, and reactive to light.      Comments: No nystagmus or deviated gaze noted   Cardiovascular:      Rate and Rhythm: Normal rate.   Pulmonary:      Effort: Pulmonary effort is normal.   Neurological:      Mental Status: He is alert. Mental status is at baseline.      Cranial Nerves: Cranial nerves 2-12 are intact.      Sensory: Sensation is intact.      Motor: Weakness present. No seizure activity.      Coordination: Finger-Nose-Finger Test normal.      Deep Tendon Reflexes: Babinski sign absent on the right side. Babinski sign absent on the left side.      Reflex Scores:       Bicep reflexes are 2+ on the right side and 2+ on the left side.       Patellar reflexes are 2+ on the right side and 2+ on the left side.     Comments:     Cranial Nerves   CN II: Pupils are equal, round, and reactive to light. Normal visual acuity and visual fields.    CN III IV VI: Extraocular movements are full without nystagmus.  CN V: Normal facial sensation and strength of muscles of mastication.  CN VII: Facial movements are  symmetric. No weakness.  CN VIII: [place]  CN IX & X:  Symmetric palatal movement.  CN XI: Sternocleidomastoid and trapezius are normal.  No weakness.  CN XII: The tongue is midline.  No atrophy or fasciculations.            Vitals:   Temp:  [96.9 °F (36.1 °C)-98 °F (36.7 °C)] 98 °F (36.7 °C)  Heart Rate:  [51-64] 51  Resp:  [18-20] 18  BP: ()/(53-69) 97/63    Current Medications    Current Facility-Administered Medications:     acetaminophen (TYLENOL) tablet 650 mg, 650 mg, Oral, Q4H PRN **OR** acetaminophen (TYLENOL) 160 MG/5ML oral solution 650 mg, 650 mg, Oral, Q4H PRN **OR** acetaminophen (TYLENOL) suppository 650 mg, 650 mg, Rectal, Q4H PRN, Beryl Hall, APRN    apixaban (ELIQUIS) tablet 5 mg, 5 mg, Oral, BID, Beryl Hall, APRN, 5 mg at 01/16/24 0839    sennosides-docusate (PERICOLACE) 8.6-50 MG per tablet 2 tablet, 2 tablet, Oral, BID, 2 tablet at 01/16/24 0840 **AND** polyethylene glycol (MIRALAX) packet 17 g, 17 g, Oral, Daily PRN **AND** bisacodyl (DULCOLAX) EC tablet 5 mg, 5 mg, Oral, Daily PRN **AND** bisacodyl (DULCOLAX) suppository 10 mg, 10 mg, Rectal, Daily PRN, Beryl Hall, APRN    diazePAM (VALIUM) injection 2.5 mg, 2.5 mg, Intravenous, Q6H PRN, Cosme Park MD    erythromycin (ROMYCIN) ophthalmic ointment, , Right Eye, Q12H, Beryl Hall, APRN    famotidine (PEPCID) tablet 40 mg, 40 mg, Oral, Daily, Beryl Hall, APRN, 40 mg at 01/16/24 0840    finasteride (PROSCAR) tablet 5 mg, 5 mg, Oral, Daily, Beryl Hall APRN, 5 mg at 01/16/24 0839    gabapentin (NEURONTIN) capsule 100 mg, 100 mg, Oral, TID PRN, Beryl Hall APRN, 100 mg at 01/15/24 2207    lacosamide (VIMPAT) tablet 100 mg, 100 mg, Oral, Daily, Beryl Hall APRN, 100 mg at 01/16/24 0839    lactated ringers infusion, 125 mL/hr, Intravenous, Continuous, Beryl Hall APRN, Last Rate: 125 mL/hr at 01/15/24 2112, 125 mL/hr at 01/15/24 2112    losartan  "(COZAAR) tablet 25 mg, 25 mg, Oral, Daily, Beryl Hall, APRN, 25 mg at 01/16/24 0840    melatonin tablet 5 mg, 5 mg, Oral, Nightly, Shaina Jonas, APRN, 5 mg at 01/15/24 2207    oxybutynin XL (DITROPAN-XL) 24 hr tablet 5 mg, 5 mg, Oral, Daily, Beryl Hall, APRN, 5 mg at 01/16/24 0840    pantoprazole (PROTONIX) EC tablet 40 mg, 40 mg, Oral, Q AM, Beryl Hall, APRN, 40 mg at 01/16/24 0611    PHENobarbital (LUMINAL) tablet 64.8 mg, 64.8 mg, Oral, Nightly, Cosme Park MD, 64.8 mg at 01/15/24 2207    piperacillin-tazobactam (ZOSYN) 3.375 g in iso-osmotic dextrose 50 ml (premix), 3.375 g, Intravenous, Q8H, Madie Alvarez, H, Last Rate: 0 mL/hr at 01/15/24 1700, 3.375 g at 01/16/24 0611    pramipexole (MIRAPEX) tablet 1.5 mg, 1.5 mg, Oral, BID, Beryl Hall W, APRN, 1.5 mg at 01/16/24 0840    sodium chloride 0.9 % flush 10 mL, 10 mL, Intravenous, Q12H, Beryl Hall, APRN, 10 mL at 01/16/24 0840    sodium chloride 0.9 % flush 10 mL, 10 mL, Intravenous, PRN, Beryl Hall W, APRN    sodium chloride 0.9 % infusion 40 mL, 40 mL, Intravenous, PRN, Ravinder Halla W, APRN    topiramate (TOPAMAX) tablet 200 mg, 200 mg, Oral, Daily, Ravinder Halla W, APRN, 200 mg at 01/15/24 1243    Laboratory Results:   Lab Results   Component Value Date    GLUCOSE 94 01/16/2024    CALCIUM 8.9 01/16/2024     01/16/2024    K 3.7 01/16/2024    CO2 23.0 01/16/2024     (H) 01/16/2024    BUN 18 01/16/2024    CREATININE 0.94 01/16/2024    BCR 19.1 01/16/2024    ANIONGAP 9.0 01/16/2024     Lab Results   Component Value Date    WBC 5.28 01/16/2024    HGB 12.0 (L) 01/16/2024    HCT 37.0 (L) 01/16/2024    MCV 96.4 01/16/2024     01/16/2024     No results found for: \"CHOL\"  No results found for: \"HDL\"  No results found for: \"LDL\"  No results found for: \"TRIG\"  No results found for: \"HGBA1C\"  Lab Results   Component Value Date    INR 1.08 12/30/2023    PROTIME 11.4 " "12/30/2023     No results found for: \"FOLATE\"  No results found for: \"UPBPANRT39\"    MRI Brain Without Contrast    Result Date: 1/15/2024  MRI BRAIN WO CONTRAST Date of Exam: 1/15/2024 4:10 AM EST Indication: AMS, seizure.  Comparison: CT scan of the head dated January 14, 2024 Technique:  Routine multiplanar/multisequence sequence images of the brain were obtained without contrast administration. Findings: There is diffuse generalized atrophy. There are areas of increased T2 and FLAIR signal throughout the bilateral periventricular and subcortical white matter consistent with chronic microvascular ischemia. The diffusion weighted sequences are normal. The major intracranial flow voids are preserved. There are no air-fluid levels in the sinuses to indicate acute sinusitis.     Impression: Impression: Atrophy and chronic microvascular ischemic change. No convincing acute intracranial process. Electronically Signed: Christopher Collins MD  1/15/2024 5:13 AM EST  Workstation ID: [id number]        Assessment / Plan   Brief Patient Summary:  Caesar Kim is a 83 y.o. male with past medical history of HTN, A-fib, HLD, BPH, dementia and GERD who presented to [facility] ED via EMS for increasing confusion.      EEG on 1/15/2024 showed nonspecific diffuse cerebral dysfunction of mild degree.  No ongoing seizure-like activity or focal features noted.     MRI brain without contrast showed no acute intracranial process. Was positive for generalized atrophy along with chronic microvascular ischemic changes.     Plan:   History of seizure disorder  Altered mental status-improving  Would continue to hold ciprofloxacin as it could lower seizure threshold along with increasing altered mental status.  Continue antibiotic regimen per hospitalist team.  Continue home Topamax 200 mg daily  Continue Vimpat 100 mg daily  Continue phenobarbital at previous home dose of 64.8 mg nightly  Phenobarbital level trending up.  Will need recheck in approximately 1 " month.  Continue seizure and fall precautions  Melatonin 5 mg nightly  Patient work with PT/OT  Neurochecks every 4 hours  General neurology will continue to follow    I have discussed the above with the patient, bedside RN Dr. Vides  Time spent with patient: 35 minutes in face-to-face evaluation and management of the patient.    Copied text in this note has been reviewed and is accurate as of 01/16/24.     Shaina Jonas, APRN

## 2024-01-16 NOTE — PLAN OF CARE
Goal Outcome Evaluation:  Plan of Care Reviewed With: patient, spouse        Progress: declining  Outcome Evaluation: Pt. needed increased assist with bed mobility and transfers today as compared to yesterday with PT evaluation.  Pt. with posterior and left lean with narrow SHANELLE on stand needing max A of 2 to stand.  Pt. presents with imparied strength, ROM, balance, cognition and endurance impacting PLOF ADLs and related transfers.  Typically wife can get pt. OOB to a w/c on own.  Pt. appropriate for skilled OT services to address deficit areas and promote return to PLOF ADLs and related transfers.  Recommend SNF at discharge.      Anticipated Discharge Disposition (OT): skilled nursing facility

## 2024-01-17 PROCEDURE — 99232 SBSQ HOSP IP/OBS MODERATE 35: CPT

## 2024-01-17 PROCEDURE — 99232 SBSQ HOSP IP/OBS MODERATE 35: CPT | Performed by: FAMILY MEDICINE

## 2024-01-17 RX ADMIN — PRAMIPEXOLE DIHYDROCHLORIDE 1.5 MG: 1 TABLET ORAL at 20:36

## 2024-01-17 RX ADMIN — Medication 5 MG: at 20:36

## 2024-01-17 RX ADMIN — POLYMYXIN B SULFATE AND TRIMETHOPRIM 1 DROP: 10000; 1 SOLUTION OPHTHALMIC at 20:36

## 2024-01-17 RX ADMIN — POLYMYXIN B SULFATE AND TRIMETHOPRIM 1 DROP: 10000; 1 SOLUTION OPHTHALMIC at 12:16

## 2024-01-17 RX ADMIN — SENNOSIDES AND DOCUSATE SODIUM 2 TABLET: 8.6; 5 TABLET ORAL at 09:40

## 2024-01-17 RX ADMIN — PANTOPRAZOLE SODIUM 40 MG: 40 TABLET, DELAYED RELEASE ORAL at 06:29

## 2024-01-17 RX ADMIN — POLYMYXIN B SULFATE AND TRIMETHOPRIM 1 DROP: 10000; 1 SOLUTION OPHTHALMIC at 17:38

## 2024-01-17 RX ADMIN — TOPIRAMATE 200 MG: 100 TABLET, FILM COATED ORAL at 20:36

## 2024-01-17 RX ADMIN — PRAMIPEXOLE DIHYDROCHLORIDE 1.5 MG: 1 TABLET ORAL at 10:33

## 2024-01-17 RX ADMIN — APIXABAN 5 MG: 5 TABLET, FILM COATED ORAL at 20:37

## 2024-01-17 RX ADMIN — PHENOBARBITAL 64.8 MG: 64.8 TABLET ORAL at 20:36

## 2024-01-17 RX ADMIN — APIXABAN 5 MG: 5 TABLET, FILM COATED ORAL at 09:40

## 2024-01-17 RX ADMIN — OXYBUTYNIN CHLORIDE 5 MG: 5 TABLET, EXTENDED RELEASE ORAL at 09:40

## 2024-01-17 RX ADMIN — LACOSAMIDE 100 MG: 100 TABLET, FILM COATED ORAL at 09:39

## 2024-01-17 RX ADMIN — FAMOTIDINE 40 MG: 20 TABLET, FILM COATED ORAL at 09:40

## 2024-01-17 RX ADMIN — Medication 10 ML: at 20:37

## 2024-01-17 RX ADMIN — FINASTERIDE 5 MG: 5 TABLET, FILM COATED ORAL at 09:40

## 2024-01-17 RX ADMIN — Medication 10 ML: at 09:40

## 2024-01-17 RX ADMIN — SENNOSIDES AND DOCUSATE SODIUM 2 TABLET: 8.6; 5 TABLET ORAL at 20:36

## 2024-01-17 RX ADMIN — POLYMYXIN B SULFATE AND TRIMETHOPRIM 1 DROP: 10000; 1 SOLUTION OPHTHALMIC at 09:40

## 2024-01-17 NOTE — CASE MANAGEMENT/SOCIAL WORK
Continued Stay Note   Josephine     Patient Name: Caesar Kim  MRN: [id number]  Today's Date: 1/17/2024    Admit Date: 1/14/2024    Plan: [facility]Rehab   Discharge Plan       Row Name 01/17/24 1646       Plan    Plan [facility]    Patient/Family in Agreement with Plan yes  Patient/spouse    Plan Comments Met with patient and spouse this morning to discuss transfer to inpatient rehab prior to returning home.  Spouse reports patient previously at [facility] x 14 days and did well, both agreeable to go to [facility] again for more rehab with plan to have home health continue once home.  Currently followed by [facility].  Referral made to [facility] today, discussed in unit multidisciplinary rounds and with nursing this morning.  Carmen Hodge, Ext. [phone]    Final Discharge Disposition Code 62 - inpatient rehab facility                   Discharge Codes    No documentation.                 Expected Discharge Date and Time       Expected Discharge Date Expected Discharge Time    Jan 19, 2024               ISAC Ureña

## 2024-01-17 NOTE — PLAN OF CARE
Problem: Adult Inpatient Plan of Care  Goal: Plan of Care Review  Outcome: Ongoing, Progressing  Flowsheets (Taken 1/17/2024 7992)  Progress: no change  Plan of Care Reviewed With: spouse   Goal Outcome Evaluation:  Plan of Care Reviewed With: spouse        Progress: no change     No complaints per patient. Confused to time/situation. Wife at bedside. Rehab at discharge.

## 2024-01-17 NOTE — PROGRESS NOTES
Psychiatric Neurology    Progress Note    Patient Name: Caesar Kim  : 1940  MRN: [id number]  Primary Care Physician:  Provider, No Known  Date of admission: 2024    Subjective     Chief Complaint: Seizure    History of Present Illness   Patient was seen resting comfortably in bed.  Per wife he is back to his baseline.  No seizure-like activity or acute events overnight.    Review of Systems   General: Negative for fever, nausea, or vomiting.   Neurological: Negative for headache, pain, or weakness.     Objective     Physical Exam  Vitals and nursing note reviewed.   Constitutional:       General: He is not in acute distress.     Appearance: He is not ill-appearing.   Eyes:      Extraocular Movements: Extraocular movements intact.      Pupils: Pupils are equal, round, and reactive to light.      Comments: No nystagmus or deviated gaze notes.    Cardiovascular:      Rate and Rhythm: Normal rate.   Pulmonary:      Effort: Pulmonary effort is normal.   Neurological:      Mental Status: He is alert. Mental status is at baseline.      Cranial Nerves: Cranial nerves 2-12 are intact.      Sensory: Sensation is intact.      Motor: Motor function is intact. No seizure activity.          Vitals:   Temp:  [97.5 °F (36.4 °C)-98.1 °F (36.7 °C)] 97.5 °F (36.4 °C)  Heart Rate:  [51-80] 52  Resp:  [16-18] 16  BP: ()/(54-74) 118/74    Current Medications    Current Facility-Administered Medications:     acetaminophen (TYLENOL) tablet 650 mg, 650 mg, Oral, Q4H PRN **OR** acetaminophen (TYLENOL) 160 MG/5ML oral solution 650 mg, 650 mg, Oral, Q4H PRN **OR** acetaminophen (TYLENOL) suppository 650 mg, 650 mg, Rectal, Q4H PRN, Beryl Hall W, APRN    apixaban (ELIQUIS) tablet 5 mg, 5 mg, Oral, BID, Beryl Hall, APRN, 5 mg at 24 2211    sennosides-docusate (PERICOLACE) 8.6-50 MG per tablet 2 tablet, 2 tablet, Oral, BID, 2 tablet at 24 0840 **AND** polyethylene glycol (MIRALAX) packet 17  g, 17 g, Oral, Daily PRN **AND** bisacodyl (DULCOLAX) EC tablet 5 mg, 5 mg, Oral, Daily PRN **AND** bisacodyl (DULCOLAX) suppository 10 mg, 10 mg, Rectal, Daily PRN, Beryl Hall W, APRN    diazePAM (VALIUM) injection 2.5 mg, 2.5 mg, Intravenous, Q6H PRN, Cosme Park MD    famotidine (PEPCID) tablet 40 mg, 40 mg, Oral, Daily, Ravinder Halla W, APRN, 40 mg at 01/16/24 0840    finasteride (PROSCAR) tablet 5 mg, 5 mg, Oral, Daily, Beryl Hall, APRN, 5 mg at 01/16/24 0839    gabapentin (NEURONTIN) capsule 100 mg, 100 mg, Oral, TID PRN, Beryl Hall, APRN, 100 mg at 01/16/24 2211    lacosamide (VIMPAT) tablet 100 mg, 100 mg, Oral, Daily, Beryl Hall, APRN, 100 mg at 01/16/24 0839    melatonin tablet 5 mg, 5 mg, Oral, Nightly, Shaina Jonas, APRN, 5 mg at 01/16/24 2210    oxybutynin XL (DITROPAN-XL) 24 hr tablet 5 mg, 5 mg, Oral, Daily, Beryl Hall, APRN, 5 mg at 01/16/24 0840    pantoprazole (PROTONIX) EC tablet 40 mg, 40 mg, Oral, Q AM, Beryl Hall, APRN, 40 mg at 01/17/24 0629    PHENobarbital (LUMINAL) tablet 64.8 mg, 64.8 mg, Oral, Nightly, Cosme Park MD, 64.8 mg at 01/16/24 2211    pramipexole (MIRAPEX) tablet 1.5 mg, 1.5 mg, Oral, BID, Beryl Hall, APRN, 1.5 mg at 01/16/24 2210    sodium chloride 0.9 % flush 10 mL, 10 mL, Intravenous, Q12H, Beryl Hall APRN, 10 mL at 01/16/24 2212    sodium chloride 0.9 % flush 10 mL, 10 mL, Intravenous, PRN, Beryl Hall APRN    sodium chloride 0.9 % infusion 40 mL, 40 mL, Intravenous, PRN, Beryl Hall APRN    topiramate (TOPAMAX) tablet 200 mg, 200 mg, Oral, Daily, Beryl Hall APRN, 200 mg at 01/16/24 2211    trimethoprim-polymyxin b (POLYTRIM) 42873-4.1 UNIT/ML-% ophthalmic solution 1 drop, 1 drop, Right Eye, 4x Daily, Aminah Vides MD, 1 drop at 01/16/24 2211    Laboratory Results:   Lab Results   Component Value Date    GLUCOSE 94 01/16/2024     "CALCIUM 8.9 01/16/2024     01/16/2024    K 3.7 01/16/2024    CO2 23.0 01/16/2024     (H) 01/16/2024    BUN 18 01/16/2024    CREATININE 0.94 01/16/2024    BCR 19.1 01/16/2024    ANIONGAP 9.0 01/16/2024     Lab Results   Component Value Date    WBC 5.28 01/16/2024    HGB 12.0 (L) 01/16/2024    HCT 37.0 (L) 01/16/2024    MCV 96.4 01/16/2024     01/16/2024     No results found for: \"CHOL\"  No results found for: \"HDL\"  No results found for: \"LDL\"  No results found for: \"TRIG\"  No results found for: \"HGBA1C\"  Lab Results   Component Value Date    INR 1.08 12/30/2023    PROTIME 11.4 12/30/2023     No results found for: \"FOLATE\"  No results found for: \"CRAGYDLC62\"    MRI Brain Without Contrast    Result Date: 1/15/2024  MRI BRAIN WO CONTRAST Date of Exam: 1/15/2024 4:10 AM EST Indication: AMS, seizure.  Comparison: CT scan of the head dated January 14, 2024 Technique:  Routine multiplanar/multisequence sequence images of the brain were obtained without contrast administration. Findings: There is diffuse generalized atrophy. There are areas of increased T2 and FLAIR signal throughout the bilateral periventricular and subcortical white matter consistent with chronic microvascular ischemia. The diffusion weighted sequences are normal. The major intracranial flow voids are preserved. There are no air-fluid levels in the sinuses to indicate acute sinusitis.     Impression: Impression: Atrophy and chronic microvascular ischemic change. No convincing acute intracranial process. Electronically Signed: Christopher Collins MD  1/15/2024 5:13 AM EST  Workstation ID: [id number]        Assessment / Plan   Brief Patient Summary:  Caesar Kim is a 83 y.o. male with past medical history of HTN, A-fib, HLD, BPH, dementia and GERD who presented to [facility] ED via EMS for increasing confusion.      EEG on 1/15/2024 showed nonspecific diffuse cerebral dysfunction of mild degree.  No ongoing seizure-like activity or focal features " noted.     MRI brain without contrast showed no acute intracranial process. Was positive for generalized atrophy along with chronic microvascular ischemic changes.     Plan:   History of seizure disorder  Altered mental status-improving  Would continue to hold ciprofloxacin as it could lower seizure threshold along with increasing altered mental status.  Continue antibiotic regimen per hospitalist team.  Continue home Topamax 200 mg daily  Continue Vimpat 100 mg daily  Continue phenobarbital at previous home dose of 64.8 mg nightly  Phenobarbital level trending up.  Will need recheck in approximately 1 month.  Continue seizure and fall precautions  Melatonin 5 mg nightly  Patient work with PT/OT  Neurochecks every 4 hours  Patient to follow-up with Dr. Garcia appointment scheduled on 3/19/2024.  General neurology will see patient on as needed basis.     I have discussed the above with the patient, bedside RN, Dr. Morales  Time spent with patient: 35 minutes in face-to-face evaluation and management of the patient.    Copied text in this note has been reviewed and is accurate as of 01/17/24.     HEIDI Veliz

## 2024-01-17 NOTE — PROGRESS NOTES
[facility] Medicine Services  PROGRESS NOTE    Patient Name: Caesar Kim  : 1940  MRN: [id number]    Date of Admission: 2024  Primary Care Physician: Provider, No Known    Subjective   Subjective     CC:  F/U Breakthrough sz     HPI:  Patient seen and examined. Mentation improved. Discussed therapy's recommendation for SNF. Patient really wants to go home but understands he may need STR. PT signed in to see him today, will see if he has made any improvements.     Objective   Objective     Vital Signs:   Temp:  [97.5 °F (36.4 °C)-98.1 °F (36.7 °C)] 97.7 °F (36.5 °C)  Heart Rate:  [51-80] 55  Resp:  [16-18] 17  BP: (113-124)/(54-74) 115/69     Physical Exam:  Constitutional: No acute distress, awake, alert, sitting up in bed  HENT: NCAT, mucous membranes moist  Respiratory: Clear to auscultation bilaterally, respiratory effort normal   Cardiovascular: SB, no murmurs, rubs, or gallops  Gastrointestinal: Soft, nontender, nondistended  Musculoskeletal: No edema appreciated   Psychiatric: Appropriate affect, cooperative  Neurologic: No focal deficits, speech clear   Skin: No rashes    Results Reviewed:  LAB RESULTS:      Lab 01/16/24  0421 01/15/24  0528 24  2211   WBC 5.28 5.05 6.74   HEMOGLOBIN 12.0* 12.7* 15.0   HEMATOCRIT 37.0* 39.5 46.1   PLATELETS 167 183 224   NEUTROS ABS  --  2.65 4.69   IMMATURE GRANS (ABS)  --  0.01 0.02   LYMPHS ABS  --  1.22 1.04   MONOS ABS  --  0.95* 0.85   EOS ABS  --  0.16 0.06   MCV 96.4 96.3 95.6   CRP  --   --  1.13*   PROCALCITONIN  --   --  0.15   LACTATE  --   --  1.4         Lab 01/16/24  0421 01/15/24  0528 24  2211   SODIUM 141 141 140   POTASSIUM 3.7 3.8 4.2   CHLORIDE 109* 108* 104   CO2 23.0 24.0 25.0   ANION GAP 9.0 9.0 11.0   BUN 18 22 23   CREATININE 0.94 1.11 1.13   EGFR 80.4 65.9 64.5   GLUCOSE 94 82 99   CALCIUM 8.9 8.9 9.6   MAGNESIUM  --   --  2.4   PHOSPHORUS  --   --  4.2   TSH  --   --  5.000*         Lab  01/14/24 2211   TOTAL PROTEIN 7.9   ALBUMIN 4.2   GLOBULIN 3.7   ALT (SGPT) 16   AST (SGOT) 27   BILIRUBIN 0.7   ALK PHOS 53   LIPASE 38         Lab 01/15/24  0528 01/15/24  0210 01/14/24 2211   HSTROP T 30* 32* 30*                 Brief Urine Lab Results  (Last result in the past 365 days)        Color   Clarity   Blood   Leuk Est   Nitrite   Protein   CREAT   Urine HCG        01/14/24 2233 Yellow   Turbid   Small (1+)   Large (3+)   Negative   30 mg/dL (1+)                   Microbiology Results Abnormal       Procedure Component Value - Date/Time    Blood Culture - Blood, Hand, Right [[id number]]  (Normal) Collected: 01/14/24 2152    Lab Status: Preliminary result Specimen: Blood from Hand, Right Updated: 01/17/24 0245     Blood Culture No growth at 2 days    Blood Culture - Blood, Arm, Right [[id number]]  (Normal) Collected: 01/14/24 2211    Lab Status: Preliminary result Specimen: Blood from Arm, Right Updated: 01/17/24 0245     Blood Culture No growth at 2 days    Urine Culture - Urine, Urine, Clean Catch [[id number]]  (Normal) Collected: 01/14/24 2233    Lab Status: Final result Specimen: Urine, Clean Catch Updated: 01/16/24 0944     Urine Culture No growth    COVID PRE-OP / PRE-PROCEDURE SCREENING ORDER (NO ISOLATION) - Swab, Nasopharynx [[id number]]  (Normal) Collected: 01/14/24 2159    Lab Status: Final result Specimen: Swab from Nasopharynx Updated: 01/14/24 2322    Narrative:      The following orders were created for panel order COVID PRE-OP / PRE-PROCEDURE SCREENING ORDER (NO ISOLATION) - Swab, Nasopharynx.  Procedure                               Abnormality         Status                     ---------                               -----------         ------                     Respiratory Panel PCR w/...[[id number]]  Normal              Final result                 Please view results for these tests on the individual orders.    Respiratory Panel PCR w/COVID-19(SARS-CoV-2) BRENNAN/MARIA TERESA/DARINEL/PAD/COR/DANIELLE  In-House, NP Swab in [facility], 2 HR TAT - Swab, Nasopharynx [[id number]]  (Normal) Collected: 01/14/24 2159    Lab Status: Final result Specimen: Swab from Nasopharynx Updated: 01/14/24 9033     ADENOVIRUS, PCR Not Detected     Coronavirus 229E Not Detected     Coronavirus HKU1 Not Detected     Coronavirus NL63 Not Detected     Coronavirus OC43 Not Detected     COVID19 Not Detected     Human Metapneumovirus Not Detected     Human Rhinovirus/Enterovirus Not Detected     Influenza A PCR Not Detected     Influenza B PCR Not Detected     Parainfluenza Virus 1 Not Detected     Parainfluenza Virus 2 Not Detected     Parainfluenza Virus 3 Not Detected     Parainfluenza Virus 4 Not Detected     RSV, PCR Not Detected     Bordetella pertussis pcr Not Detected     Bordetella parapertussis PCR Not Detected     Chlamydophila pneumoniae PCR Not Detected     Mycoplasma pneumo by PCR Not Detected    Narrative:      In the setting of a positive respiratory panel with a viral infection PLUS a negative procalcitonin without other underlying concern for bacterial infection, consider observing off antibiotics or discontinuation of antibiotics and continue supportive care. If the respiratory panel is positive for atypical bacterial infection (Bordetella pertussis, Chlamydophila pneumoniae, or Mycoplasma pneumoniae), consider antibiotic de-escalation to target atypical bacterial infection.            No radiology results from the last 24 hrs        Current medications:  Scheduled Meds:apixaban, 5 mg, Oral, BID  famotidine, 40 mg, Oral, Daily  finasteride, 5 mg, Oral, Daily  lacosamide, 100 mg, Oral, Daily  melatonin, 5 mg, Oral, Nightly  oxybutynin XL, 5 mg, Oral, Daily  pantoprazole, 40 mg, Oral, Q AM  PHENobarbital, 64.8 mg, Oral, Nightly  pramipexole, 1.5 mg, Oral, BID  senna-docusate sodium, 2 tablet, Oral, BID  sodium chloride, 10 mL, Intravenous, Q12H  topiramate, 200 mg, Oral, Daily  trimethoprim-polymyxin b, 1 drop, Right Eye, 4x  Daily      Continuous Infusions:   PRN Meds:.  acetaminophen **OR** acetaminophen **OR** acetaminophen    senna-docusate sodium **AND** polyethylene glycol **AND** bisacodyl **AND** bisacodyl    diazePAM    gabapentin    sodium chloride    sodium chloride    Assessment & Plan   Assessment & Plan     Active Hospital Problems    Diagnosis  POA    **Complicated UTI (urinary tract infection) [N39.0]  Yes    HLD (hyperlipidemia) [E78.5]  Yes    Seizure [R56.9]  Yes    BPH (benign prostatic hyperplasia) [N40.0]  Yes    GERD without esophagitis [K21.9]  Yes    Essential hypertension [I10]  Yes    Atrial fibrillation [I48.91]  Yes    Urinary tract infection [N39.0]  Yes      Resolved Hospital Problems   No resolved problems to display.        Brief Hospital Course to date:  Caesar Kim is a 83 y.o. male w known seizure disorder on phenobarbitol (recently decreased 11/10/2023 after normal levels) who presented with confusion and breakthrough seizure in setting of dirty urinalysis.    This patient's problems and plans were partially entered by my partner and updated as appropriate by me 01/17/24.   All problems are new to me today      Breakthrough seizure w known sz disorder  -reduced dose of phenobarbitol w breakthrough sz, cipro before admission also poss contributor  -increased to previous sz controlled dose of phenobarbitol per neurology. Will take time to become therapeutic, plan to re-check in 1 month     Confusion in setting of dirty u/a  -dirty u/a 1/11 at home health - urine culture negative  -dirty u/a 1/13 started on ciprofloaxin - no culture sent  -dirty u/a 1/14 - urine culture negative  -suspect confusion independent of true UTIs after this evaluation. D/c abx and monitor. Recommend OP neurology evaluation    Mild rhabdomyolysis - improved w IVF, continue to encourage PO intake  Right eye conjunctivitis - eye drops w improvement  Debility - walks w walker and assist w transitions at baseline, current rec for  [facility]. PT to see today. Pt agreeable to rehab if needed     CHFpEF - without exacerbation  Relative hypotension - hold home losartan for now  H/o PE, chronic atrial fibrillation - home eliquis  GERD - ppi  BPH - home proscar  BMI 30    Expected Discharge Location and Transportation: [facility]  Expected Discharge   Expected Discharge Date: 1/18/2024; Expected Discharge Time:      DVT prophylaxis:  Medical DVT prophylaxis orders are present.     AM-PAC 6 Clicks Score (PT): 12 (01/16/24 2000)    CODE STATUS:   Code Status and Medical Interventions:   Ordered at: 01/15/24 0218     Medical Intervention Limits:    NO intubation (DNI)     Level Of Support Discussed With:    Next of Kin (If No Surrogate)     Code Status (Patient has no pulse and is not breathing):    No CPR (Do Not Attempt to Resuscitate)     Medical Interventions (Patient has pulse or is breathing):    Limited Support     Comments:    Patient's wife reports having documentation, she reports she will provide a copy.       Susi Morales,   01/17/24

## 2024-01-17 NOTE — PLAN OF CARE
Goal Outcome Evaluation:  Plan of Care Reviewed With: patient        Progress: improving  Outcome Evaluation: VSS, on RA. No c/o pain. Pt slept well. UOP-WNL. Tolerating P.O. intake. Anticipate possible d/c home today. Pt has been A&OX 4 during shift. Will continue POC.

## 2024-01-18 PROCEDURE — 97530 THERAPEUTIC ACTIVITIES: CPT

## 2024-01-18 PROCEDURE — 99231 SBSQ HOSP IP/OBS SF/LOW 25: CPT | Performed by: FAMILY MEDICINE

## 2024-01-18 RX ORDER — IPRATROPIUM BROMIDE AND ALBUTEROL SULFATE 2.5; .5 MG/3ML; MG/3ML
3 SOLUTION RESPIRATORY (INHALATION) EVERY 6 HOURS PRN
Status: DISCONTINUED | OUTPATIENT
Start: 2024-01-18 | End: 2024-01-19 | Stop reason: HOSPADM

## 2024-01-18 RX ADMIN — POLYMYXIN B SULFATE AND TRIMETHOPRIM 1 DROP: 10000; 1 SOLUTION OPHTHALMIC at 09:05

## 2024-01-18 RX ADMIN — PRAMIPEXOLE DIHYDROCHLORIDE 1.5 MG: 1 TABLET ORAL at 09:08

## 2024-01-18 RX ADMIN — PRAMIPEXOLE DIHYDROCHLORIDE 1.5 MG: 1 TABLET ORAL at 20:34

## 2024-01-18 RX ADMIN — SENNOSIDES AND DOCUSATE SODIUM 2 TABLET: 8.6; 5 TABLET ORAL at 09:04

## 2024-01-18 RX ADMIN — POLYMYXIN B SULFATE AND TRIMETHOPRIM 1 DROP: 10000; 1 SOLUTION OPHTHALMIC at 14:01

## 2024-01-18 RX ADMIN — TOPIRAMATE 200 MG: 100 TABLET, FILM COATED ORAL at 20:34

## 2024-01-18 RX ADMIN — PHENOBARBITAL 64.8 MG: 64.8 TABLET ORAL at 20:34

## 2024-01-18 RX ADMIN — PANTOPRAZOLE SODIUM 40 MG: 40 TABLET, DELAYED RELEASE ORAL at 05:01

## 2024-01-18 RX ADMIN — POLYMYXIN B SULFATE AND TRIMETHOPRIM 1 DROP: 10000; 1 SOLUTION OPHTHALMIC at 17:20

## 2024-01-18 RX ADMIN — APIXABAN 5 MG: 5 TABLET, FILM COATED ORAL at 09:04

## 2024-01-18 RX ADMIN — LACOSAMIDE 100 MG: 100 TABLET, FILM COATED ORAL at 09:04

## 2024-01-18 RX ADMIN — Medication 10 ML: at 20:35

## 2024-01-18 RX ADMIN — FAMOTIDINE 40 MG: 20 TABLET, FILM COATED ORAL at 09:04

## 2024-01-18 RX ADMIN — Medication 5 MG: at 20:34

## 2024-01-18 RX ADMIN — OXYBUTYNIN CHLORIDE 5 MG: 5 TABLET, EXTENDED RELEASE ORAL at 09:05

## 2024-01-18 RX ADMIN — FINASTERIDE 5 MG: 5 TABLET, FILM COATED ORAL at 09:04

## 2024-01-18 RX ADMIN — Medication 10 ML: at 09:05

## 2024-01-18 RX ADMIN — APIXABAN 5 MG: 5 TABLET, FILM COATED ORAL at 20:34

## 2024-01-18 RX ADMIN — POLYMYXIN B SULFATE AND TRIMETHOPRIM 1 DROP: 10000; 1 SOLUTION OPHTHALMIC at 20:34

## 2024-01-18 NOTE — CASE MANAGEMENT/SOCIAL WORK
Continued Stay Note   [facility]     Patient Name: Caesar Kim  MRN: [id number]  Today's Date: 1/18/2024    Admit Date: 1/14/2024    Plan: [facility]   Discharge Plan       Row Name 01/18/24 1718       Plan    Plan [facility]    Patient/Family in Agreement with Plan yes    Plan Comments [facility] has accepted Mr. Kim for rehab, may transfer on 1/19/24.  [facility] ambulance arranged for 1:45pm.  RN may call report to #[phone], FRANCO redd fax transfer summary to fax# [phone].  Discussed in unit multidisciplinary rounds this morning and updated Dr. Morales.  Carmen Hodge, Ext. [phone]    Final Discharge Disposition Code 62 - inpatient rehab facility                   Discharge Codes    No documentation.                 Expected Discharge Date and Time       Expected Discharge Date Expected Discharge Time    Jan 19, 2024               ISAC Ureña

## 2024-01-18 NOTE — PROGRESS NOTES
[facility] Medicine Services  PROGRESS NOTE    Patient Name: Caesar Kim  : 1940  MRN: [id number]    Date of Admission: 2024  Primary Care Physician: Provider, No Known    Subjective   Subjective     CC:  F/U Breakthrough sz     HPI:  Patient seen and examined. Sitting up in bed using his IS. No complaints. Agreeable to rehab now, discussed we are waiting to hear from [facility]    Objective   Objective     Vital Signs:   Temp:  [97.1 °F (36.2 °C)-98.3 °F (36.8 °C)] 97.8 °F (36.6 °C)  Heart Rate:  [52-92] 66  Resp:  [18] 18  BP: (120-140)/(66-78) 120/71     Physical Exam:  Constitutional: No acute distress, awake, alert, sitting up in bed, using IS  HENT: NCAT, mucous membranes moist  Respiratory: Clear to auscultation bilaterally, respiratory effort normal   Cardiovascular: RRR, no murmurs, rubs, or gallops  Gastrointestinal: Soft, nontender, nondistended  Musculoskeletal: No edema appreciated   Psychiatric: Appropriate affect, cooperative  Neurologic: No focal deficits, speech clear   Skin: No rashes    Results Reviewed:  LAB RESULTS:      Lab 01/16/24  0421 01/15/24  0528 24  2211   WBC 5.28 5.05 6.74   HEMOGLOBIN 12.0* 12.7* 15.0   HEMATOCRIT 37.0* 39.5 46.1   PLATELETS 167 183 224   NEUTROS ABS  --  2.65 4.69   IMMATURE GRANS (ABS)  --  0.01 0.02   LYMPHS ABS  --  1.22 1.04   MONOS ABS  --  0.95* 0.85   EOS ABS  --  0.16 0.06   MCV 96.4 96.3 95.6   CRP  --   --  1.13*   PROCALCITONIN  --   --  0.15   LACTATE  --   --  1.4         Lab 24  0421 01/15/24  0528 24  2211   SODIUM 141 141 140   POTASSIUM 3.7 3.8 4.2   CHLORIDE 109* 108* 104   CO2 23.0 24.0 25.0   ANION GAP 9.0 9.0 11.0   BUN 18 22 23   CREATININE 0.94 1.11 1.13   EGFR 80.4 65.9 64.5   GLUCOSE 94 82 99   CALCIUM 8.9 8.9 9.6   MAGNESIUM  --   --  2.4   PHOSPHORUS  --   --  4.2   TSH  --   --  5.000*         Lab 24  2211   TOTAL PROTEIN 7.9   ALBUMIN 4.2   GLOBULIN 3.7   ALT (SGPT) 16   AST  (SGOT) 27   BILIRUBIN 0.7   ALK PHOS 53   LIPASE 38         Lab 01/15/24  0528 01/15/24  0210 01/14/24 2211   HSTROP T 30* 32* 30*                 Brief Urine Lab Results  (Last result in the past 365 days)        Color   Clarity   Blood   Leuk Est   Nitrite   Protein   CREAT   Urine HCG        01/14/24 2233 Yellow   Turbid   Small (1+)   Large (3+)   Negative   30 mg/dL (1+)                   Microbiology Results Abnormal       Procedure Component Value - Date/Time    Blood Culture - Blood, Hand, Right [[id number]]  (Normal) Collected: 01/14/24 2152    Lab Status: Preliminary result Specimen: Blood from Hand, Right Updated: 01/18/24 0245     Blood Culture No growth at 3 days    Blood Culture - Blood, Arm, Right [[id number]]  (Normal) Collected: 01/14/24 2211    Lab Status: Preliminary result Specimen: Blood from Arm, Right Updated: 01/18/24 0245     Blood Culture No growth at 3 days    Urine Culture - Urine, Urine, Clean Catch [[id number]]  (Normal) Collected: 01/14/24 2233    Lab Status: Final result Specimen: Urine, Clean Catch Updated: 01/16/24 0944     Urine Culture No growth    COVID PRE-OP / PRE-PROCEDURE SCREENING ORDER (NO ISOLATION) - Swab, Nasopharynx [[id number]]  (Normal) Collected: 01/14/24 2159    Lab Status: Final result Specimen: Swab from Nasopharynx Updated: 01/14/24 2322    Narrative:      The following orders were created for panel order COVID PRE-OP / PRE-PROCEDURE SCREENING ORDER (NO ISOLATION) - Swab, Nasopharynx.  Procedure                               Abnormality         Status                     ---------                               -----------         ------                     Respiratory Panel PCR w/...[[id number]]  Normal              Final result                 Please view results for these tests on the individual orders.    Respiratory Panel PCR w/COVID-19(SARS-CoV-2) BRENNAN/MARIA TERESA/DARINEL/PAD/COR/DANIELLE In-House, NP Swab in UTM/VTM, 2 HR TAT - Swab, Nasopharynx [[id number]]  (Normal) Collected:  01/14/24 2159    Lab Status: Final result Specimen: Swab from Nasopharynx Updated: 01/14/24 2322     ADENOVIRUS, PCR Not Detected     Coronavirus 229E Not Detected     Coronavirus HKU1 Not Detected     Coronavirus NL63 Not Detected     Coronavirus OC43 Not Detected     COVID19 Not Detected     Human Metapneumovirus Not Detected     Human Rhinovirus/Enterovirus Not Detected     Influenza A PCR Not Detected     Influenza B PCR Not Detected     Parainfluenza Virus 1 Not Detected     Parainfluenza Virus 2 Not Detected     Parainfluenza Virus 3 Not Detected     Parainfluenza Virus 4 Not Detected     RSV, PCR Not Detected     Bordetella pertussis pcr Not Detected     Bordetella parapertussis PCR Not Detected     Chlamydophila pneumoniae PCR Not Detected     Mycoplasma pneumo by PCR Not Detected    Narrative:      In the setting of a positive respiratory panel with a viral infection PLUS a negative procalcitonin without other underlying concern for bacterial infection, consider observing off antibiotics or discontinuation of antibiotics and continue supportive care. If the respiratory panel is positive for atypical bacterial infection (Bordetella pertussis, Chlamydophila pneumoniae, or Mycoplasma pneumoniae), consider antibiotic de-escalation to target atypical bacterial infection.            No radiology results from the last 24 hrs        Current medications:  Scheduled Meds:apixaban, 5 mg, Oral, BID  famotidine, 40 mg, Oral, Daily  finasteride, 5 mg, Oral, Daily  lacosamide, 100 mg, Oral, Daily  melatonin, 5 mg, Oral, Nightly  oxybutynin XL, 5 mg, Oral, Daily  pantoprazole, 40 mg, Oral, Q AM  PHENobarbital, 64.8 mg, Oral, Nightly  pramipexole, 1.5 mg, Oral, BID  senna-docusate sodium, 2 tablet, Oral, BID  sodium chloride, 10 mL, Intravenous, Q12H  topiramate, 200 mg, Oral, Daily  trimethoprim-polymyxin b, 1 drop, Right Eye, 4x Daily      Continuous Infusions:   PRN Meds:.  acetaminophen **OR** acetaminophen **OR**  acetaminophen    senna-docusate sodium **AND** polyethylene glycol **AND** bisacodyl **AND** bisacodyl    diazePAM    gabapentin    ipratropium-albuterol    sodium chloride    sodium chloride    Assessment & Plan   Assessment & Plan     Active Hospital Problems    Diagnosis  POA    **Complicated UTI (urinary tract infection) [N39.0]  Yes    HLD (hyperlipidemia) [E78.5]  Yes    Seizure [R56.9]  Yes    BPH (benign prostatic hyperplasia) [N40.0]  Yes    GERD without esophagitis [K21.9]  Yes    Essential hypertension [I10]  Yes    Atrial fibrillation [I48.91]  Yes    Urinary tract infection [N39.0]  Yes      Resolved Hospital Problems   No resolved problems to display.        Brief Hospital Course to date:  Caesar Kim is a 83 y.o. male w known seizure disorder on phenobarbitol (recently decreased 11/10/2023 after normal levels) who presented with confusion and breakthrough seizure in setting of dirty urinalysis.    This patient's problems and plans were partially entered by my partner and updated as appropriate by me 01/18/24.       Breakthrough seizure w known sz disorder  -reduced dose of phenobarbitol w breakthrough sz, cipro before admission also poss contributor  -increased to previous sz controlled dose of phenobarbitol per neurology. Will take time to become therapeutic, plan to re-check in 1 month     Confusion in setting of dirty u/a  -dirty u/a 1/11 at home health - urine culture negative  -dirty u/a 1/13 started on ciprofloaxin - no culture sent  -dirty u/a 1/14 - urine culture negative  -suspect confusion independent of true UTIs after this evaluation. Monitoring off ABX. Recommend OP neurology evaluation    Mild rhabdomyolysis - improved w IVF, continue to encourage PO intake  Right eye conjunctivitis - eye drops w improvement  Debility - walks w walker and assist w transitions at baseline, current rec for IPR. Patient agreeable.    CHFpEF - without exacerbation  Relative hypotension - hold home losartan  for now  H/o PE, chronic atrial fibrillation - home eliquis  GERD - ppi  BPH - home proscar  BMI 30    Expected Discharge Location and Transportation: IPR, medically ready   Expected Discharge   Expected Discharge Date: 1/19/2024; Expected Discharge Time:      DVT prophylaxis:  Medical DVT prophylaxis orders are present.     AM-PAC 6 Clicks Score (PT): 13 (01/17/24 2000)    CODE STATUS:   Code Status and Medical Interventions:   Ordered at: 01/15/24 0218     Medical Intervention Limits:    NO intubation (DNI)     Level Of Support Discussed With:    Next of Kin (If No Surrogate)     Code Status (Patient has no pulse and is not breathing):    No CPR (Do Not Attempt to Resuscitate)     Medical Interventions (Patient has pulse or is breathing):    Limited Support     Comments:    Patient's wife reports having documentation, she reports she will provide a copy.       Susi Morales,   01/18/24

## 2024-01-18 NOTE — THERAPY TREATMENT NOTE
Patient Name: Caesar Kim  : 1940    MRN: [id number]                              Today's Date: 2024       Admit Date: 2024    Visit Dx:     ICD-10-CM ICD-9-CM   1. Altered mental status, unspecified altered mental status type  R41.82 780.97   2. Acute UTI (urinary tract infection)  N39.0 599.0   3. Elevated CK  R74.8 790.5   4. Encephalopathy, unspecified  G93.40 348.30   5. Essential hypertension  I10 401.9     Patient Active Problem List   Diagnosis    Urinary tract infection    Muscle weakness    Encephalopathy, unspecified    Fall    Right hip pain    Essential hypertension    Atrial fibrillation    HLD (hyperlipidemia)    Seizure    BPH (benign prostatic hyperplasia)    GERD without esophagitis    Complicated UTI (urinary tract infection)     Past Medical History:   Diagnosis Date    AAA (abdominal aortic aneurysm)     Arthritis     Atrial fibrillation     BPH (benign prostatic hyperplasia)     CHF (congestive heart failure)     COPD (chronic obstructive pulmonary disease)     Diabetes mellitus     Elevated cholesterol     GERD (gastroesophageal reflux disease)     Hypertension     Memory loss     Personal history of pulmonary embolism     Pulmonary embolism     Restless leg syndrome     Sleep apnea     Uses CPAP    Unspecified convulsions     Unspecified diastolic (congestive) heart failure     Urinary tract infection      Past Surgical History:   Procedure Laterality Date    CHOLECYSTECTOMY      COLONOSCOPY      CYSTOSCOPY TRANSURETHRAL RESECTION OF PROSTATE N/A 2022    Procedure: CYSTOSCOPY TRANSURETHRAL RESECTION OF PROSTATE GREENLIGHT;  Surgeon: Darius Costa MD;  Location: [facility];  Service: Urology;  Laterality: N/A;    ENDOSCOPY      EYE SURGERY Bilateral     CATARACTS    SHOULDER SURGERY Left     SKIN BIOPSY      NECK    TONSILLECTOMY      AND ADENOIDS      General Information       Row Name 24 1554          Physical Therapy Time and Intention    Document Type  therapy note (daily note)  -ND     Mode of Treatment physical therapy  -ND       Row Name 01/18/24 2276          General Information    Patient Profile Reviewed yes  -ND     Existing Precautions/Restrictions fall;seizures  -ND     Barriers to Rehab medically complex;previous functional deficit;cognitive status  -ND       Row Name 01/18/24 2983          Cognition    Orientation Status (Cognition) oriented to;person;disoriented to;place;situation;time  -ND       Row Name 01/18/24 5357          Safety Issues, Functional Mobility    Safety Issues Affecting Function (Mobility) ability to follow commands;awareness of need for assistance;insight into deficits/self-awareness;judgment;safety precaution awareness;safety precautions follow-through/compliance;sequencing abilities  -ND     Impairments Affecting Function (Mobility) balance;cognition;coordination;endurance/activity tolerance;shortness of breath;strength;range of motion (ROM)  -ND     Cognitive Impairments, Mobility Safety/Performance awareness, need for assistance;insight into deficits/self-awareness;judgment;problem-solving/reasoning;safety precaution awareness;safety precaution follow-through;sequencing abilities  -ND               User Key  (r) = Recorded By, (t) = Taken By, (c) = Cosigned By      Initials Name Provider Type    ND Darling Boyd PT Physical Therapist                   Mobility       Row Name 01/18/24 0503          Bed Mobility    Bed Mobility rolling left;rolling right;supine-sit;sit-supine  -ND     Rolling Left Wh[place] (Bed Mobility) moderate assist (50% patient effort);1 person assist;verbal cues;nonverbal cues (demo/gesture)  -ND     Rolling Right [place] (Bed Mobility) moderate assist (50% patient effort);1 person assist;verbal cues;nonverbal cues (demo/gesture)  -ND     Supine-Sit [place] (Bed Mobility) moderate assist (50% patient effort);1 person assist;verbal cues;nonverbal cues (demo/gesture)  -ND     Sit-Supine  [place] (Bed Mobility) maximum assist (25% patient effort);1 person assist;verbal cues;nonverbal cues (demo/gesture)  -ND     Assistive Device (Bed Mobility) bed rails;draw sheet;head of bed elevated  -ND     Comment, (Bed Mobility) Increased time for task and VC for sequencing. Pt rolling L/R x2 for brief change and position changing at end of session. Pt sit EOB for ~5 min and performs AROM and dynamic reaching.  -ND       Row Name 01/18/24 1555          Transfers    Comment, (Transfers) Pt asks to return to supine at end of session.  -ND       Row Name 01/18/24 1555          Sit-Stand Transfer    Comment, (Sit-Stand Transfer) Not attempted due to increased fatigue this date.  -ND               User Key  (r) = Recorded By, (t) = Taken By, (c) = Cosigned By      Initials Name Provider Type    Darling Isaac PT Physical Therapist                   Obj/Interventions       Row Name 01/18/24 1556          Motor Skills    Therapeutic Exercise shoulder;hip;knee  -ND       Row Name 01/18/24 [facility]6          Shoulder (Therapeutic Exercise)    Shoulder (Therapeutic Exercise) AROM (active range of motion)  -ND     Shoulder AROM (Therapeutic Exercise) bilateral;flexion;5 repetitions  -ND       Row Name 01/18/24 1556          Hip (Therapeutic Exercise)    Hip (Therapeutic Exercise) strengthening exercise  -ND     Hip Strengthening (Therapeutic Exercise) bilateral;marching while seated;5 repetitions  Limited by strength and ROM  -ND       Row Name 01/18/24 1556          Knee (Therapeutic Exercise)    Knee (Therapeutic Exercise) strengthening exercise  -ND     Knee Strengthening (Therapeutic Exercise) bilateral;LAQ (long arc quad);5 repetitions  Limited by strength and ROM  -ND       Row Name 01/18/24 [facility]          Balance    Balance Assessment sitting static balance;sitting dynamic balance  -ND     Static Sitting Balance contact guard;verbal cues;non-verbal cues (demo/gesture)  -ND     Dynamic Sitting Balance minimal  assist;verbal cues;non-verbal cues (demo/gesture)  -ND     Position, Sitting Balance unsupported;sitting edge of bed  -ND     Balance Interventions sitting;static;dynamic;dynamic reaching  -ND     Comment, Balance Pt with strong posterior lean upon sitting upright at EOB but progressively able to lean anteriorly and maintain balance with use of both UE with cueing. Pt performs dynamic reaching in sitting to help facilitate intiation of abdominal muscles to maintain upright sitting.  -ND               User Key  (r) = Recorded By, (t) = Taken By, (c) = Cosigned By      Initials Name Provider Type    ND Darling oByd PT Physical Therapist                   Goals/Plan    No documentation.                  Clinical Impression       Row Name 01/18/24 1559          Pain    Pretreatment Pain Rating 0/10 - no pain  -ND     Posttreatment Pain Rating 0/10 - no pain  -ND     Pain Intervention(s) Repositioned;Ambulation/increased activity  -ND       Row Name 01/18/24 1559          Plan of Care Review    Plan of Care Reviewed With patient  -ND     Progress no change  -ND     Outcome Evaluation Pt with increased fatigue this date. Pt limited in mobility by strength, balance, ROM, and activity tolerance warranting continued skilled therapy to address deficits and facilitate increased independence with mobility tasks. Recommending SNF following d/c.  -ND       Row Name 01/18/24 1559          Vital Signs    O2 Delivery Pre Treatment room air  -ND     O2 Delivery Intra Treatment room air  -ND     O2 Delivery Post Treatment room air  -ND     Pre Patient Position Supine  -ND     Intra Patient Position Sitting  -ND     Post Patient Position Supine  -ND       Row Name 01/18/24 1559          Positioning and Restraints    Pre-Treatment Position in bed  -ND     Post Treatment Position bed  -ND     In Bed notified nsg;side lying right;call light within reach;encouraged to call for assist;exit alarm on;side rails up x3  -ND                User Key  (r) = Recorded By, (t) = Taken By, (c) = Cosigned By      Initials Name Provider Type    Darling Isaac, CARMEN Physical Therapist                   Outcome Measures       Row Name 01/18/24 1600 01/18/24 0800       How much help from another person do you currently need...    Turning from your back to your side while in flat bed without using bedrails? 3  -ND 4  -CD    Moving from lying on back to sitting on the side of a flat bed without bedrails? 2  -ND 3  -CD    Moving to and from a bed to a chair (including a wheelchair)? 2  -ND 2  -CD    Standing up from a chair using your arms (e.g., wheelchair, bedside chair)? 2  -ND 2  -CD    Climbing 3-5 steps with a railing? 1  -ND 1  -CD    To walk in hospital room? 1  -ND 1  -CD    AM-PAC 6 Clicks Score (PT) 11  -ND 13  -CD    Highest Level of Mobility Goal 4 --> Transfer to chair/commode  -ND 4 --> Transfer to chair/commode  -CD      Row Name 01/18/24 1600          Functional Assessment    Outcome Measure Options AM-PAC 6 Clicks Basic Mobility (PT)  -ND               User Key  (r) = Recorded By, (t) = Taken By, (c) = Cosigned By      Initials Name Provider Type    Dayana Burnette RN Registered Nurse    Darling Isaac, CARMEN Physical Therapist                                 Physical Therapy Education       Title: PT OT SLP Therapies (In Progress)       Topic: Physical Therapy (In Progress)       Point: Mobility training (In Progress)       Learning Progress Summary             Patient Acceptance, E, NR by ND at 1/18/2024 1600    Acceptance, E,D, NR by MB at 1/15/2024 1600                         Point: Home exercise program (In Progress)       Learning Progress Summary             Patient Acceptance, E, NR by ND at 1/18/2024 1600    Acceptance, E,D, NR by MB at 1/15/2024 1600                         Point: Body mechanics (In Progress)       Learning Progress Summary             Patient Acceptance, E, NR by ND at 1/18/2024 1600    Acceptance,  E,D, NR by MB at 1/15/2024 1600                         Point: Precautions (In Progress)       Learning Progress Summary             Patient Acceptance, E, NR by ND at 1/18/2024 1600    Acceptance, E,D, NR by MB at 1/15/2024 1600                                         User Key       Initials Effective Dates Name Provider Type Discipline    MB 06/16/21 -  Yolette Wheeler PT Physical Therapist PT    ND 11/16/23 -  Darling Boyd PT Physical Therapist PT                  PT Recommendation and Plan     Plan of Care Reviewed With: patient  Progress: no change  Outcome Evaluation: Pt with increased fatigue this date. Pt limited in mobility by strength, balance, ROM, and activity tolerance warranting continued skilled therapy to address deficits and facilitate increased independence with mobility tasks. Recommending SNF following d/c.     Time Calculation:         PT Charges       Row Name 01/18/24 1602             Time Calculation    Start Time 1535  -ND      PT Received On 01/18/24  -ND      PT Goal Re-Cert Due Date 01/25/24  -ND         Timed Charges    51408 - PT Therapeutic Activity Minutes 18  -ND         Total Minutes    Timed Charges Total Minutes 18  -ND       Total Minutes 18  -ND                User Key  (r) = Recorded By, (t) = Taken By, (c) = Cosigned By      Initials Name Provider Type    Darling Isaac, PT Physical Therapist                  Therapy Charges for Today       Code Description Service Date Service Provider Modifiers Qty    86216820730 HC PT THERAPEUTIC ACT EA 15 MIN 1/18/2024 Darling Boyd, PT GP 1            PT G-Codes  Outcome Measure Options: AM-PAC 6 Clicks Basic Mobility (PT)  AM-PAC 6 Clicks Score (PT): 11  AM-PAC 6 Clicks Score (OT): 10  PT Discharge Summary  Anticipated Discharge Disposition (PT): skilled nursing facility    Darling Boyd PT  1/18/2024

## 2024-01-18 NOTE — PLAN OF CARE
Goal Outcome Evaluation:                                       VSS on room air. Resting well this shift. Aox2. No pain per patient. Rehab @ d/c.

## 2024-01-18 NOTE — PLAN OF CARE
Goal Outcome Evaluation:  Plan of Care Reviewed With: patient        Progress: no change  Outcome Evaluation: Pt with increased fatigue this date. Pt limited in mobility by strength, balance, ROM, and activity tolerance warranting continued skilled therapy to address deficits and facilitate increased independence with mobility tasks. Recommending SNF following d/c.      Anticipated Discharge Disposition (PT): skilled nursing facility

## 2024-01-19 VITALS
RESPIRATION RATE: 17 BRPM | HEIGHT: 67 IN | SYSTOLIC BLOOD PRESSURE: 127 MMHG | BODY MASS INDEX: 30.87 KG/M2 | DIASTOLIC BLOOD PRESSURE: 77 MMHG | TEMPERATURE: 97.5 F | HEART RATE: 63 BPM | WEIGHT: 196.7 LBS | OXYGEN SATURATION: 90 %

## 2024-01-19 PROBLEM — N39.0 COMPLICATED UTI (URINARY TRACT INFECTION): Status: RESOLVED | Noted: 2024-01-15 | Resolved: 2024-01-19

## 2024-01-19 PROBLEM — N39.0 URINARY TRACT INFECTION: Status: RESOLVED | Noted: 2023-10-18 | Resolved: 2024-01-19

## 2024-01-19 PROBLEM — R56.9 SEIZURE: Status: RESOLVED | Noted: 2024-01-15 | Resolved: 2024-01-19

## 2024-01-19 PROCEDURE — 99239 HOSP IP/OBS DSCHRG MGMT >30: CPT | Performed by: FAMILY MEDICINE

## 2024-01-19 RX ORDER — CHOLECALCIFEROL (VITAMIN D3) 125 MCG
5 CAPSULE ORAL NIGHTLY
Start: 2024-01-19

## 2024-01-19 RX ORDER — IPRATROPIUM BROMIDE AND ALBUTEROL SULFATE 2.5; .5 MG/3ML; MG/3ML
3 SOLUTION RESPIRATORY (INHALATION) EVERY 6 HOURS PRN
Start: 2024-01-19

## 2024-01-19 RX ORDER — POLYMYXIN B SULFATE AND TRIMETHOPRIM 1; 10000 MG/ML; [USP'U]/ML
1 SOLUTION OPHTHALMIC 4 TIMES DAILY
Start: 2024-01-19 | End: 2024-01-22

## 2024-01-19 RX ADMIN — LACOSAMIDE 100 MG: 100 TABLET, FILM COATED ORAL at 10:49

## 2024-01-19 RX ADMIN — POLYMYXIN B SULFATE AND TRIMETHOPRIM 1 DROP: 10000; 1 SOLUTION OPHTHALMIC at 10:49

## 2024-01-19 RX ADMIN — APIXABAN 5 MG: 5 TABLET, FILM COATED ORAL at 10:49

## 2024-01-19 RX ADMIN — PANTOPRAZOLE SODIUM 40 MG: 40 TABLET, DELAYED RELEASE ORAL at 05:30

## 2024-01-19 RX ADMIN — OXYBUTYNIN CHLORIDE 5 MG: 5 TABLET, EXTENDED RELEASE ORAL at 10:49

## 2024-01-19 RX ADMIN — FINASTERIDE 5 MG: 5 TABLET, FILM COATED ORAL at 10:49

## 2024-01-19 RX ADMIN — ACETAMINOPHEN 650 MG: 325 TABLET ORAL at 10:48

## 2024-01-19 RX ADMIN — SENNOSIDES AND DOCUSATE SODIUM 2 TABLET: 8.6; 5 TABLET ORAL at 10:48

## 2024-01-19 RX ADMIN — FAMOTIDINE 40 MG: 20 TABLET, FILM COATED ORAL at 10:48

## 2024-01-19 NOTE — PLAN OF CARE
Goal Outcome Evaluation:           Progress: no change  Outcome Evaluation: VSS on RA. Patient denies pain. No prns administered. Adequate UOP. Large BM this shift. Skin care provided. Calm, cooperative, pleasantly confused. Tolerating diet. Care ongoing, continue to monitor.

## 2024-01-19 NOTE — PLAN OF CARE
Goal Outcome Evaluation:                                            VSS. Tolerating PO. BM this shift. Confused but pleasant and easily redirected. Pills whole. D/c to [facility] this afternoon via [facility] @ 8170.

## 2024-01-19 NOTE — DISCHARGE SUMMARY
[facility] Medicine Services  DISCHARGE SUMMARY    Patient Name: Caesar Kim  : 1940  MRN: [id number]    Date of Admission: 2024  8:28 PM  Date of Discharge:  2024  Primary Care Physician: Jonathan, No Known    Consults       Date and Time Order Name Status Description    1/15/2024  3:05 AM Inpatient Neurology Consult General Completed             Hospital Course     Active Hospital Problems    Diagnosis  POA    HLD (hyperlipidemia) [E78.5]  Yes    BPH (benign prostatic hyperplasia) [N40.0]  Yes    GERD without esophagitis [K21.9]  Yes    Essential hypertension [I10]  Yes    Atrial fibrillation [I48.91]  Yes      Resolved Hospital Problems    Diagnosis Date Resolved POA    **Complicated UTI (urinary tract infection) [N39.0] 2024 Yes    Seizure [R56.9] 2024 Yes    Urinary tract infection [N39.0] 2024 Yes          Hospital Course:  Caesar Kim is a 83 y.o. male w known seizure disorder on phenobarbitol (recently decreased 11/10/2023 after normal levels) who presented with confusion and breakthrough seizure in setting of dirty urinalysis.     Breakthrough seizure w known sz disorder  -reduced dose of phenobarbitol w breakthrough sz, cipro before admission also poss contributor  -increased to previous sz controlled dose of phenobarbitol per neurology. Will take time to become therapeutic, plan to re-check in 1 month   -Continue Topamax 200mg daily  -Continue Vimpat 100mg daily   -Continue Melatonin 5mg nightly      Confusion in setting of dirty u/a  -dirty u/a  at home health - urine culture negative  -dirty u/a  started on ciprofloaxin - no culture sent  -dirty u/a  - urine culture negative  -suspect confusion independent of true UTIs after this evaluation. Monitoring off ABX.   -Recommend OP neurology evaluation, keep scheduled follow-up      Mild rhabdomyolysis - improved w IVF, continue to encourage PO intake  Right eye conjunctivitis - eye  drops w improvement  Debility - walks w walker and assist w transitions at baseline, current rec for IPR. Patient agreeable, plan to transfer to [facility] today      CHFpEF - without exacerbation  Relative hypotension - holding home losartan for now  H/o PE, chronic atrial fibrillation - home eliquis  GERD - ppi  BPH - home proscar  BMI 30    Discharge Follow Up Recommendations for outpatient labs/diagnostics:  -PCP 1 week  -Dr Garcia as scheduled 3/19/24    Day of Discharge     HPI:   Patient seen and examined. Sitting up in bed. No complaints, asking for another carton of milk. Understands he is going to rehab this afternoon.     Review of Systems  Gen- No fevers, chills  CV- No chest pain, palpitations  Resp- No cough, dyspnea  GI- No N/V/D, abd pain    Vital Signs:   Temp:  [97 °F (36.1 °C)-98.5 °F (36.9 °C)] 97.5 °F (36.4 °C)  Heart Rate:  [56-68] 63  Resp:  [17-18] 17  BP: (120-138)/(71-84) 127/77      Physical Exam:  Constitutional: No acute distress, awake, alert, sitting up in bed, drinking milk  HENT: NCAT, mucous membranes moist  Respiratory: Clear to auscultation bilaterally, respiratory effort normal RA  Cardiovascular: RRR, no murmurs, rubs, or gallops  Gastrointestinal: Soft, nontender, nondistended  Musculoskeletal: No edema appreciated   Psychiatric: Appropriate affect, cooperative  Neurologic: No focal deficits, speech clear   Skin: No rashes    Pertinent  and/or Most Recent Results     LAB RESULTS:      Lab 01/16/24  0421 01/15/24  0528 01/14/24  2211   WBC 5.28 5.05 6.74   HEMOGLOBIN 12.0* 12.7* 15.0   HEMATOCRIT 37.0* 39.5 46.1   PLATELETS 167 183 224   NEUTROS ABS  --  2.65 4.69   IMMATURE GRANS (ABS)  --  0.01 0.02   LYMPHS ABS  --  1.22 1.04   MONOS ABS  --  0.95* 0.85   EOS ABS  --  0.16 0.06   MCV 96.4 96.3 95.6   CRP  --   --  1.13*   PROCALCITONIN  --   --  0.15   LACTATE  --   --  1.4         Lab 01/16/24  0421 01/15/24  0528 01/14/24  2211   SODIUM 141 141 140   POTASSIUM 3.7 3.8 4.2    CHLORIDE 109* 108* 104   CO2 23.0 24.0 25.0   ANION GAP 9.0 9.0 11.0   BUN 18 22 23   CREATININE 0.94 1.11 1.13   EGFR 80.4 65.9 64.5   GLUCOSE 94 82 99   CALCIUM 8.9 8.9 9.6   MAGNESIUM  --   --  2.4   PHOSPHORUS  --   --  4.2   TSH  --   --  5.000*         Lab 01/14/24 2211   TOTAL PROTEIN 7.9   ALBUMIN 4.2   GLOBULIN 3.7   ALT (SGPT) 16   AST (SGOT) 27   BILIRUBIN 0.7   ALK PHOS 53   LIPASE 38         Lab 01/15/24  0528 01/15/24  0210 01/14/24 2211   HSTROP T 30* 32* 30*                 Brief Urine Lab Results  (Last result in the past 365 days)        Color   Clarity   Blood   Leuk Est   Nitrite   Protein   CREAT   Urine HCG        01/14/24 2233 Yellow   Turbid   Small (1+)   Large (3+)   Negative   30 mg/dL (1+)                 Microbiology Results (last 10 days)       Procedure Component Value - Date/Time    Urine Culture - Urine, Urine, Clean Catch [[id number]]  (Normal) Collected: 01/14/24 2233    Lab Status: Final result Specimen: Urine, Clean Catch Updated: 01/16/24 0944     Urine Culture No growth    Blood Culture - Blood, Arm, Right [[id number]]  (Normal) Collected: 01/14/24 2211    Lab Status: Preliminary result Specimen: Blood from Arm, Right Updated: 01/19/24 0245     Blood Culture No growth at 4 days    COVID PRE-OP / PRE-PROCEDURE SCREENING ORDER (NO ISOLATION) - Swab, Nasopharynx [[id number]]  (Normal) Collected: 01/14/24 2159    Lab Status: Final result Specimen: Swab from Nasopharynx Updated: 01/14/24 2322    Narrative:      The following orders were created for panel order COVID PRE-OP / PRE-PROCEDURE SCREENING ORDER (NO ISOLATION) - Swab, Nasopharynx.  Procedure                               Abnormality         Status                     ---------                               -----------         ------                     Respiratory Panel PCR w/...[[id number]]  Normal              Final result                 Please view results for these tests on the individual orders.    Respiratory Panel  PCR w/COVID-19(SARS-CoV-2) [facility] In-House, NP Swab in UTM/VTM, 2 HR TAT - Swab, Nasopharynx [[id number]]  (Normal) Collected: 01/14/24 2159    Lab Status: Final result Specimen: Swab from Nasopharynx Updated: 01/14/24 2322     ADENOVIRUS, PCR Not Detected     Coronavirus 229E Not Detected     Coronavirus HKU1 Not Detected     Coronavirus NL63 Not Detected     Coronavirus OC43 Not Detected     COVID19 Not Detected     Human Metapneumovirus Not Detected     Human Rhinovirus/Enterovirus Not Detected     Influenza A PCR Not Detected     Influenza B PCR Not Detected     Parainfluenza Virus 1 Not Detected     Parainfluenza Virus 2 Not Detected     Parainfluenza Virus 3 Not Detected     Parainfluenza Virus 4 Not Detected     RSV, PCR Not Detected     Bordetella pertussis pcr Not Detected     Bordetella parapertussis PCR Not Detected     Chlamydophila pneumoniae PCR Not Detected     Mycoplasma pneumo by PCR Not Detected    Narrative:      In the setting of a positive respiratory panel with a viral infection PLUS a negative procalcitonin without other underlying concern for bacterial infection, consider observing off antibiotics or discontinuation of antibiotics and continue supportive care. If the respiratory panel is positive for atypical bacterial infection (Bordetella pertussis, Chlamydophila pneumoniae, or Mycoplasma pneumoniae), consider antibiotic de-escalation to target atypical bacterial infection.    Blood Culture - Blood, Hand, Right [[id number]]  (Normal) Collected: 01/14/24 2152    Lab Status: Preliminary result Specimen: Blood from Hand, Right Updated: 01/19/24 0245     Blood Culture No growth at 4 days            EEG    Result Date: 1/15/2024  Reason for referral: 83 y.o.male with seizure, altered mental status Technical Summary:  A 19 channel digital EEG was performed using the international 10-20 placement system, including eye leads and EKG leads. Duration: 20 minutes Findings: The patient  is drowsy.  The background shows diffuse medium amplitude 5-6 Hz theta which is present symmetrically over both hemispheres.  A clear posterior rhythm is not seen.  EMG artifact is variably prominent.  Light sleep is seen with mild slowing of the tracing.  Photic stimulation does not change the background.  Hyperventilation is not performed.  No focal features or epileptiform activity are seen. Video: Available Technical quality: Superior EKG: Irregular, 50 to 60 bpm SUMMARY: Mild generalized slow No focal features or epileptiform activity are seen     Diffuse cerebral dysfunction of mild degree, nonspecific No ongoing seizures are seen This report is transcribed using the Dragon dictation system.      MRI Brain Without Contrast    Result Date: 1/15/2024  MRI BRAIN WO CONTRAST Date of Exam: 1/15/2024 4:10 AM EST Indication: AMS, seizure.  Comparison: CT scan of the head dated January 14, 2024 Technique:  Routine multiplanar/multisequence sequence images of the brain were obtained without contrast administration. Findings: There is diffuse generalized atrophy. There are areas of increased T2 and FLAIR signal throughout the bilateral periventricular and subcortical white matter consistent with chronic microvascular ischemia. The diffusion weighted sequences are normal. The major intracranial flow voids are preserved. There are no air-fluid levels in the sinuses to indicate acute sinusitis.     Impression: Atrophy and chronic microvascular ischemic change. No convincing acute intracranial process. Electronically Signed: Christopher Collins MD  1/15/2024 5:13 AM EST  Workstation ID: [id number]    XR Abdomen KUB    Result Date: 1/15/2024  XR ABDOMEN KUB Date of Exam: 1/15/2024 2:10 AM EST Indication: mri clearance Comparison: None available. 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 The gas pattern is nonspecific. There are clips from cholecystectomy. Review of the chest radiograph demonstrates no evidence of  metallic foreign body, spinal stimulator or cardiac pacer device. Review of the patient's head CT fails to demonstrate metal in the eyes or evidence of cochlear implant.     Impression: The patient is cleared for MRI as detailed. Electronically Signed: Christopher Collins MD  1/15/2024 2:39 AM EST  Workstation ID: [id number]    CT Head Without Contrast    Result Date: 1/14/2024  CT HEAD WO CONTRAST Date of Exam: 1/14/2024 9:21 PM EST Indication: Altered mental status, suspect metabolic.. Comparison: CT head November 22, 2023 Technique: Axial CT images were obtained of the head without contrast administration.  Automated exposure control and iterative construction methods were used. Findings: There is moderate diffuse generalized atrophy. There are low-attenuation areas in the periventricular white matter consistent with chronic microvascular ischemic change. There is no mass, mass effect or midline shift. There are no abnormal extra-axial fluid collections or areas of acute hemorrhage. The paranasal sinuses are clear. The mastoid air cells are clear.     Impression: Moderate atrophy and chronic microvascular ischemia. No acute intracranial process. Electronically Signed: Christopher Collins MD  1/14/2024 9:51 PM EST  Workstation ID: [id number]    XR Chest 1 View    Result Date: 1/14/2024  XR CHEST 1 VW Date of Exam: 1/14/2024 8:40 PM EST Indication: Weak/Dizzy/AMS triage protocol Comparison: None available. Findings: The heart is enlarged. There is tortuosity of the thoracic aorta. Lung volumes are slightly decreased. The lungs are clear.     Impression: No active disease Electronically Signed: Christopher Collins MD  1/14/2024 9:02 PM EST  Workstation ID: [id number]    CT Head Without Contrast    Result Date: 1/13/2024  CT HEAD WITHOUT CONTRAST HISTORY:  Acute altered mental status COMPARISON:None TECHNIQUE: Thin-section axial images of the brain were performed without contrast. This study was performed with techniques to keep radiation  doses as low as reasonably achievable, (ALARA). Individualized dose reduction techniques using automated exposure control or adjustment of mA and/or kV according to the patient size were employed. FINDINGS: There is motion artifact noted. The visualized paranasal sinuses demonstrate mild right maxillary mucosal thickening. The mastoid air cells are unremarkable. Bone windows demonstrate no fractures or other abnormalities. Intracranially, the brainstem and posterior fossa are within normal limits. There is mild to moderate atrophy with moderate to severe microvascular change. The ventricles and basal cisterns are unremarkable. There is no evidence of acute ischemia or hemorrhage. There are no masses or mass effect. There are no extra-axial fluid collections. The visualized orbits and globes are unremarkable.    Atrophy without acute intracranial abnormalities. Images reviewed, interpreted, and dictated by Dr. Lake Braden. Transcribed by Cherrie Marc PA-C.    XR Chest 1 View    Result Date: 1/13/2024  PORTABLE CHEST HISTORY: Acute altered mental status. COMPARISON: October 2023. FINDINGS: The heart is borderline in size. There is ectasia of the aorta with calcified plaque identified. The mediastinum is unremarkable. There are mild chronic changes throughout the lungs. There is no pneumothorax.    No acute cardiopulmonary process. Images reviewed, interpreted, and dictated by Dr. Lake Braden. Transcribed by Cherrie Marc PA-C.     Results for orders placed during the hospital encounter of 11/07/23    Duplex Venous Lower Extremity - Bilateral CAR    Interpretation Summary    Normal bilateral lower extremity venous duplex scan.      Results for orders placed during the hospital encounter of 11/07/23    Duplex Venous Lower Extremity - Bilateral CAR    Interpretation Summary    Normal bilateral lower extremity venous duplex scan.          Plan for Follow-up of Pending Labs/Results: Inbox   Pending Labs        Order Current Status    Blood Culture - Blood, Arm, Right Preliminary result    Blood Culture - Blood, Hand, Right Preliminary result          Discharge Details        Discharge Medications        New Medications        Instructions Start Date   ipratropium-albuterol 0.5-2.5 mg/3 ml nebulizer  Commonly known as: DUO-NEB   3 mL, Nebulization, Every 6 Hours PRN      trimethoprim-polymyxin b 52676-6.1 UNIT/ML-% ophthalmic solution  Commonly known as: POLYTRIM   1 drop, Right Eye, 4 Times Daily             Changes to Medications        Instructions Start Date   docusate sodium 100 MG capsule  Commonly known as: COLACE  What changed: Another medication with the same name was removed. Continue taking this medication, and follow the directions you see here.   100 mg, Oral, 2 Times Daily      melatonin 5 MG tablet tablet  What changed:   when to take this  reasons to take this   5 mg, Oral, Nightly      topiramate 200 MG tablet  Commonly known as: TOPAMAX  What changed:   medication strength  additional instructions  Another medication with the same name was removed. Continue taking this medication, and follow the directions you see here.   200 mg, Oral, Daily             Continue These Medications        Instructions Start Date   acetaminophen 500 MG tablet  Commonly known as: TYLENOL   500 mg, Oral, Every 6 Hours PRN      apixaban 5 MG tablet tablet  Commonly known as: ELIQUIS   5 mg, Oral, 2 Times Daily      ezetimibe 10 MG tablet  Commonly known as: ZETIA   10 mg, Oral, Daily      famotidine 40 MG tablet  Commonly known as: PEPCID   40 mg, Oral, Daily      finasteride 5 MG tablet  Commonly known as: PROSCAR   5 mg, Oral, Daily      Fluticasone Propionate (Inhal) 250 MCG/ACT aerosol powder    1 puff, Inhalation, 2 Times Daily      gabapentin 100 MG capsule  Commonly known as: NEURONTIN   100 mg, Oral, 3 Times Daily PRN      lacosamide 50 MG tablet tablet  Commonly known as: VIMPAT   100 mg, Oral, Daily      omeprazole 40  MG capsule  Commonly known as: priLOSEC   40 mg, Oral, Daily      PHENobarbital 32.4 MG tablet  Commonly known as: LUMINAL   64.8 mg, Oral, Nightly      pramipexole 1.5 MG tablet  Commonly known as: MIRAPEX   1.5 mg, Oral, 2 Times Daily      trospium 20 MG tablet  Commonly known as: SANCTURA   20 mg, Oral, 2 Times Daily             Stop These Medications      losartan 50 MG tablet  Commonly known as: COZAAR              Allergies   Allergen Reactions    Acyclovir GI Intolerance    Atorvastatin Myalgia    Fenofibrate Myalgia    Metformin Unknown - High Severity     Other reaction(s): Unknown      Mirabegron Unknown - High Severity     Other reaction(s): Unknown           Discharge Disposition:  Skilled Nursing Facility (DC - External)    Diet:  Hospital:  Diet Order   Procedures    Diet: Cardiac Diets; Healthy Heart (2-3 Na+); Texture: Regular Texture (IDDSI 7); Fluid Consistency: Thin (IDDSI 0)            Activity:      Restrictions or Other Recommendations:       CODE STATUS:    Code Status and Medical Interventions:   Ordered at: 01/15/24 0218     Medical Intervention Limits:    NO intubation (DNI)     Level Of Support Discussed With:    Next of Kin (If No Surrogate)     Code Status (Patient has no pulse and is not breathing):    No CPR (Do Not Attempt to Resuscitate)     Medical Interventions (Patient has pulse or is breathing):    Limited Support     Comments:    Patient's wife reports having documentation, she reports she will provide a copy.       Future Appointments   Date Time Provider Department Center   1/19/2024  2:00 PM MED 11  [facility]       Additional Instructions for the Follow-ups that You Need to Schedule       Discharge Follow-up with PCP   As directed       Currently Documented PCP:    Provider, No Known    PCP Phone Number:    None     Follow Up Details: 1 week        Discharge Follow-up with Specified Provider: Dr. Garcia appointment scheduled on 3/19/2024   As directed      To:   [facility] appointment scheduled on 3/19/2024                      Susi Morales DO  01/19/24      Time Spent on Discharge:  I spent  46  minutes on this discharge activity which included: face-to-face encounter with the patient, reviewing the data in the system, coordination of the care with the nursing staff as well as consultants, documentation, and entering orders.

## 2024-01-19 NOTE — CASE MANAGEMENT/SOCIAL WORK
Case Management Discharge Note      Final Note: 'er called [facility]  Julio, patient is approved for [facility] SRU Unit. Report can be called into [phone] discharge summary can be faxed to [phone]. [facility] has an inhouse pharmacy all medication needs to be listed in patients discharge summary. Family is agreeable to plan. Plan is [facility] Friday 1/19 via  EMS 1:45pm.         Selected Continued Care - Discharged on 1/19/2024 Admission date: 1/14/2024 - Discharge disposition: Skilled Nursing Facility (DC - External)      Destination Coordination complete.      Service Provider Selected Services Address Phone Fax Patient Preferred    [facility] [address] [address] [phone] --              Durable Medical Equipment    No services have been selected for the patient.                Dialysis/Infusion    No services have been selected for the patient.                Home Medical Care    No services have been selected for the patient.                Therapy    No services have been selected for the patient.                Community Resources    No services have been selected for the patient.                Community & DME    No services have been selected for the patient.                    Selected Continued Care - Prior Encounters Includes continued care and service providers with selected services from prior encounters from 10/16/2023 to 1/19/2024      Discharged on 11/10/2023 Admission date: 11/7/2023 - Discharge disposition: Rehab Facility or Unit (DC - External)      Destination       Service Provider Selected Services Address Phone Fax Patient Preferred    [facility] [address] [address] [phone] --                          Transportation Services  Ambulance: [facility]    Final Discharge Disposition Code: 03 -  skilled nursing facility (SNF)

## 2024-01-20 LAB
BACTERIA SPEC AEROBE CULT: NORMAL
BACTERIA SPEC AEROBE CULT: NORMAL

## 2024-01-22 LAB
QT INTERVAL: 406 MS
QTC INTERVAL: 429 MS

## 2024-02-21 ENCOUNTER — HOSPITAL ENCOUNTER (EMERGENCY)
Facility: HOSPITAL | Age: 84
Discharge: HOME | End: 2024-02-21
Attending: EMERGENCY MEDICINE | Admitting: EMERGENCY MEDICINE
Payer: MEDICARE

## 2024-02-21 ENCOUNTER — APPOINTMENT (OUTPATIENT)
Dept: CT IMAGING | Facility: HOSPITAL | Age: 84
End: 2024-02-21
Payer: MEDICARE

## 2024-02-21 VITALS
HEIGHT: 66 IN | DIASTOLIC BLOOD PRESSURE: 88 MMHG | WEIGHT: 200 LBS | HEART RATE: 77 BPM | SYSTOLIC BLOOD PRESSURE: 122 MMHG | BODY MASS INDEX: 32.14 KG/M2 | RESPIRATION RATE: 18 BRPM | OXYGEN SATURATION: 92 % | TEMPERATURE: 97.6 F

## 2024-02-21 DIAGNOSIS — K59.00 CONSTIPATION, UNSPECIFIED CONSTIPATION TYPE: Primary | ICD-10-CM

## 2024-02-21 LAB
ALBUMIN SERPL-MCNC: 3.9 G/DL (ref 3.5–5.2)
ALBUMIN/GLOB SERPL: 1.1 G/DL
ALP SERPL-CCNC: 51 U/L (ref 39–117)
ALT SERPL W P-5'-P-CCNC: 12 U/L (ref 1–41)
ANION GAP SERPL CALCULATED.3IONS-SCNC: 7 MMOL/L (ref 5–15)
AST SERPL-CCNC: 18 U/L (ref 1–40)
BASOPHILS # BLD AUTO: 0.05 10*3/MM3 (ref 0–0.2)
BASOPHILS NFR BLD AUTO: 0.8 % (ref 0–1.5)
BILIRUB SERPL-MCNC: 0.5 MG/DL (ref 0–1.2)
BUN SERPL-MCNC: 25 MG/DL (ref 8–23)
BUN/CREAT SERPL: 26.3 (ref 7–25)
CALCIUM SPEC-SCNC: 9.4 MG/DL (ref 8.6–10.5)
CHLORIDE SERPL-SCNC: 106 MMOL/L (ref 98–107)
CO2 SERPL-SCNC: 29 MMOL/L (ref 22–29)
CREAT SERPL-MCNC: 0.95 MG/DL (ref 0.76–1.27)
DEPRECATED RDW RBC AUTO: 53.3 FL (ref 37–54)
EGFRCR SERPLBLD CKD-EPI 2021: 78.9 ML/MIN/1.73
EOSINOPHIL # BLD AUTO: 0.11 10*3/MM3 (ref 0–0.4)
EOSINOPHIL NFR BLD AUTO: 1.8 % (ref 0.3–6.2)
ERYTHROCYTE [DISTWIDTH] IN BLOOD BY AUTOMATED COUNT: 14.5 % (ref 12.3–15.4)
GLOBULIN UR ELPH-MCNC: 3.7 GM/DL
GLUCOSE SERPL-MCNC: 124 MG/DL (ref 65–99)
HCT VFR BLD AUTO: 45.4 % (ref 37.5–51)
HGB BLD-MCNC: 14.3 G/DL (ref 13–17.7)
HOLD SPECIMEN: NORMAL
IMM GRANULOCYTES # BLD AUTO: 0.02 10*3/MM3 (ref 0–0.05)
IMM GRANULOCYTES NFR BLD AUTO: 0.3 % (ref 0–0.5)
LYMPHOCYTES # BLD AUTO: 1.28 10*3/MM3 (ref 0.7–3.1)
LYMPHOCYTES NFR BLD AUTO: 20.8 % (ref 19.6–45.3)
MCH RBC QN AUTO: 31.5 PG (ref 26.6–33)
MCHC RBC AUTO-ENTMCNC: 31.5 G/DL (ref 31.5–35.7)
MCV RBC AUTO: 100 FL (ref 79–97)
MONOCYTES # BLD AUTO: 0.81 10*3/MM3 (ref 0.1–0.9)
MONOCYTES NFR BLD AUTO: 13.2 % (ref 5–12)
NEUTROPHILS NFR BLD AUTO: 3.87 10*3/MM3 (ref 1.7–7)
NEUTROPHILS NFR BLD AUTO: 63.1 % (ref 42.7–76)
NRBC BLD AUTO-RTO: 0 /100 WBC (ref 0–0.2)
PLATELET # BLD AUTO: 181 10*3/MM3 (ref 140–450)
PMV BLD AUTO: 10.9 FL (ref 6–12)
POTASSIUM SERPL-SCNC: 4.5 MMOL/L (ref 3.5–5.2)
PROT SERPL-MCNC: 7.6 G/DL (ref 6–8.5)
RBC # BLD AUTO: 4.54 10*6/MM3 (ref 4.14–5.8)
RBC MORPH BLD: NORMAL
SMALL PLATELETS BLD QL SMEAR: ADEQUATE
SODIUM SERPL-SCNC: 142 MMOL/L (ref 136–145)
WBC MORPH BLD: NORMAL
WBC NRBC COR # BLD AUTO: 6.14 10*3/MM3 (ref 3.4–10.8)
WHOLE BLOOD HOLD COAG: NORMAL
WHOLE BLOOD HOLD SPECIMEN: NORMAL

## 2024-02-21 PROCEDURE — 74177 CT ABD & PELVIS W/CONTRAST: CPT

## 2024-02-21 PROCEDURE — 99285 EMERGENCY DEPT VISIT HI MDM: CPT

## 2024-02-21 PROCEDURE — 85007 BL SMEAR W/DIFF WBC COUNT: CPT | Performed by: EMERGENCY MEDICINE

## 2024-02-21 PROCEDURE — 25510000001 IOPAMIDOL 61 % SOLUTION: Performed by: EMERGENCY MEDICINE

## 2024-02-21 PROCEDURE — 36415 COLL VENOUS BLD VENIPUNCTURE: CPT

## 2024-02-21 PROCEDURE — 80053 COMPREHEN METABOLIC PANEL: CPT | Performed by: EMERGENCY MEDICINE

## 2024-02-21 PROCEDURE — 85025 COMPLETE CBC W/AUTO DIFF WBC: CPT | Performed by: EMERGENCY MEDICINE

## 2024-02-21 RX ORDER — SODIUM CHLORIDE 0.9 % (FLUSH) 0.9 %
10 SYRINGE (ML) INJECTION AS NEEDED
Status: DISCONTINUED | OUTPATIENT
Start: 2024-02-21 | End: 2024-02-22 | Stop reason: HOSPADM

## 2024-02-21 RX ORDER — MINERAL OIL 100 G/100G
1 OIL RECTAL ONCE
Status: COMPLETED | OUTPATIENT
Start: 2024-02-21 | End: 2024-02-21

## 2024-02-21 RX ORDER — BISACODYL 10 MG
10 SUPPOSITORY, RECTAL RECTAL DAILY
Status: DISCONTINUED | OUTPATIENT
Start: 2024-02-22 | End: 2024-02-22 | Stop reason: HOSPADM

## 2024-02-21 RX ADMIN — MINERAL OIL 1 ENEMA: 100 ENEMA RECTAL at 19:42

## 2024-02-21 RX ADMIN — MAGNESIUM HYDROXIDE 10 ML: 400 SUSPENSION ORAL at 19:50

## 2024-02-21 RX ADMIN — POLYETHYLENE GLYCOL 3350, SODIUM SULFATE ANHYDROUS, SODIUM BICARBONATE, SODIUM CHLORIDE, POTASSIUM CHLORIDE 4000 ML: 236; 22.74; 6.74; 5.86; 2.97 POWDER, FOR SOLUTION ORAL at 22:00

## 2024-02-21 RX ADMIN — IOPAMIDOL 80 ML: 612 INJECTION, SOLUTION INTRAVENOUS at 17:57

## 2024-02-21 NOTE — ED PROVIDER NOTES
Subjective   History of Present Illness  Patient is a pleasant 84-year-old male accompanied by his wife.  He has history of dementia and wife is the primary historian.  They state that for the past 21 days he has not had a bowel movement.  They state that he is attempted oral laxatives along with enemas and have not had success in treating this constipation.  Patient has not complained of abdominal pain or any other specific complaints, but given the duration of the symptoms they present to the emergency department today.  They have attempted talk to the primary care provider and despite following primary care recommendations to treat constipation he again, has not successfully had a bowel movement.      Review of Systems   All other systems reviewed and are negative.      Past Medical History:   Diagnosis Date    AAA (abdominal aortic aneurysm)     Arthritis     Atrial fibrillation     BPH (benign prostatic hyperplasia)     CHF (congestive heart failure)     COPD (chronic obstructive pulmonary disease)     Diabetes mellitus     Elevated cholesterol     GERD (gastroesophageal reflux disease)     Hypertension     Memory loss     Personal history of pulmonary embolism     Pulmonary embolism     Restless leg syndrome     Sleep apnea     Uses CPAP    Unspecified convulsions     Unspecified diastolic (congestive) heart failure     Urinary tract infection        Allergies   Allergen Reactions    Acyclovir GI Intolerance    Atorvastatin Myalgia    Fenofibrate Myalgia    Metformin Unknown - High Severity     Other reaction(s): Unknown      Mirabegron Unknown - High Severity     Other reaction(s): Unknown         Past Surgical History:   Procedure Laterality Date    CHOLECYSTECTOMY      COLONOSCOPY      CYSTOSCOPY TRANSURETHRAL RESECTION OF PROSTATE N/A 12/2/2022    Procedure: CYSTOSCOPY TRANSURETHRAL RESECTION OF PROSTATE GREENLIGHT;  Surgeon: Darius Costa MD;  Location: [facility];  Service: Urology;  Laterality:  N/A;    ENDOSCOPY      EYE SURGERY Bilateral     CATARACTS    SHOULDER SURGERY Left     SKIN BIOPSY      NECK    TONSILLECTOMY      AND ADENOIDS       Family History   Problem Relation Age of Onset    Heart attack Other     Tuberculosis Other        Social History     Socioeconomic History    Marital status:    Tobacco Use    Smoking status: Former     Types: Cigarettes     Quit date:      Years since quittin.1     Passive exposure: Past    Smokeless tobacco: Former     Types: Chew     Quit date:    Substance and Sexual Activity    Alcohol use: Never    Drug use: Defer    Sexual activity: Defer           Objective   Physical Exam  Vitals and nursing note reviewed.   Constitutional:       Appearance: Normal appearance.   HENT:      Head: Normocephalic.      Mouth/Throat:      Mouth: Mucous membranes are moist.   Eyes:      Extraocular Movements: Extraocular movements intact.      Pupils: Pupils are equal, round, and reactive to light.   Cardiovascular:      Rate and Rhythm: Normal rate and regular rhythm.      Pulses: Normal pulses.      Heart sounds: Normal heart sounds.   Pulmonary:      Effort: Pulmonary effort is normal.      Breath sounds: Normal breath sounds.   Abdominal:      General: Bowel sounds are normal.      Palpations: Abdomen is soft.   Musculoskeletal:         General: Normal range of motion.      Cervical back: Normal range of motion.   Skin:     General: Skin is warm.      Capillary Refill: Capillary refill takes less than 2 seconds.   Neurological:      General: No focal deficit present.      Mental Status: He is alert and oriented to person, place, and time. Mental status is at baseline.   Psychiatric:         Mood and Affect: Mood normal.         Behavior: Behavior normal.         Procedures           ED Course  ED Course as of 24 1150   Wed  Patient has had enema along with the Nutramigen [CP]   2208 Case discussed with Dr. Felix, gastroenterology.   Will give the patient a GoLytely.  Given the patient's dementia wife prefers outpatient treatment.  I think this is most appropriate.  That being said, she would have a low threshold to return to the emergency department if patients develop abdominal pain or other concerns arise.  Speak with Dr. Law, inpatient treatment is reasonable and should the patient fail outpatient treatment tonight admission is likely most appropriate. [CP]      ED Course User Index  [CP] Josh Travis,       Recent Results (from the past 24 hour(s))   Comprehensive Metabolic Panel    Collection Time: 02/21/24  4:02 PM    Specimen: Blood   Result Value Ref Range    Glucose 124 (H) 65 - 99 mg/dL    BUN 25 (H) 8 - 23 mg/dL    Creatinine 0.95 0.76 - 1.27 mg/dL    Sodium 142 136 - 145 mmol/L    Potassium 4.5 3.5 - 5.2 mmol/L    Chloride 106 98 - 107 mmol/L    CO2 29.0 22.0 - 29.0 mmol/L    Calcium 9.4 8.6 - 10.5 mg/dL    Total Protein 7.6 6.0 - 8.5 g/dL    Albumin 3.9 3.5 - 5.2 g/dL    ALT (SGPT) 12 1 - 41 U/L    AST (SGOT) 18 1 - 40 U/L    Alkaline Phosphatase 51 39 - 117 U/L    Total Bilirubin 0.5 0.0 - 1.2 mg/dL    Globulin 3.7 gm/dL    A/G Ratio 1.1 g/dL    BUN/Creatinine Ratio 26.3 (H) 7.0 - 25.0    Anion Gap 7.0 5.0 - 15.0 mmol/L    eGFR 78.9 >60.0 mL/min/1.73   CBC Auto Differential    Collection Time: 02/21/24  4:02 PM    Specimen: Blood   Result Value Ref Range    WBC 6.14 3.40 - 10.80 10*3/mm3    RBC 4.54 4.14 - 5.80 10*6/mm3    Hemoglobin 14.3 13.0 - 17.7 g/dL    Hematocrit 45.4 37.5 - 51.0 %    .0 (H) 79.0 - 97.0 fL    MCH 31.5 26.6 - 33.0 pg    MCHC 31.5 31.5 - 35.7 g/dL    RDW 14.5 12.3 - 15.4 %    RDW-SD 53.3 37.0 - 54.0 fl    MPV 10.9 6.0 - 12.0 fL    Platelets 181 140 - 450 10*3/mm3    Neutrophil % 63.1 42.7 - 76.0 %    Lymphocyte % 20.8 19.6 - 45.3 %    Monocyte % 13.2 (H) 5.0 - 12.0 %    Eosinophil % 1.8 0.3 - 6.2 %    Basophil % 0.8 0.0 - 1.5 %    Immature Grans % 0.3 0.0 - 0.5 %    Neutrophils, Absolute 3.87 1.70 -  7.00 10*3/mm3    Lymphocytes, Absolute 1.28 0.70 - 3.10 10*3/mm3    Monocytes, Absolute 0.81 0.10 - 0.90 10*3/mm3    Eosinophils, Absolute 0.11 0.00 - 0.40 10*3/mm3    Basophils, Absolute 0.05 0.00 - 0.20 10*3/mm3    Immature Grans, Absolute 0.02 0.00 - 0.05 10*3/mm3    nRBC 0.0 0.0 - 0.2 /100 WBC   Green Top (Gel)    Collection Time: 02/21/24  4:02 PM   Result Value Ref Range    Extra Tube Hold for add-ons.    Lavender Top    Collection Time: 02/21/24  4:02 PM   Result Value Ref Range    Extra Tube hold for add-on    Gold Top - SST    Collection Time: 02/21/24  4:02 PM   Result Value Ref Range    Extra Tube Hold for add-ons.    Gray Top    Collection Time: 02/21/24  4:02 PM   Result Value Ref Range    Extra Tube Hold for add-ons.    Light Blue Top    Collection Time: 02/21/24  4:02 PM   Result Value Ref Range    Extra Tube Hold for add-ons.    Scan Slide    Collection Time: 02/21/24  4:02 PM    Specimen: Blood   Result Value Ref Range    RBC Morphology Normal Normal    WBC Morphology Normal Normal    Platelet Estimate Adequate Normal     Note: In addition to lab results from this visit, the labs listed above may include labs taken at another facility or during a different encounter within the last 24 hours. Please correlate lab times with ED admission and discharge times for further clarification of the services performed during this visit.    CT Abdomen Pelvis With Contrast   Final Result   Constipation in the appropriate clinical setting.               Electronically Signed: Fredi Kramer MD     2/21/2024 6:38 PM EST     Workstation ID: [id number]        Vitals:    02/21/24 2130 02/21/24 2200 02/21/24 2207 02/21/24 2230   BP: 120/93 133/89  122/88   BP Location: Right arm      Patient Position: Lying      Pulse: 74 89 80 77   Resp: 18      Temp:       TempSrc:       SpO2: 95% 90% 92% 92%   Weight:       Height:         Medications   iopamidol (ISOVUE-300) 61 % injection 100 mL (80 mL Intravenous Given 2/21/24 5249)    mineral oil enema 1 enema (1 enema Rectal Given 2/21/24 1942)   magnesium hydroxide (MILK OF MAGNESIA) 400 MG/5ML suspension 10 mL (10 mL Oral Given 2/21/24 1950)   polyethylene glycol (GoLYTELY) solution 4,000 mL (4,000 mL Oral Given 2/21/24 2200)     ECG/EMG Results (last 24 hours)       ** No results found for the last 24 hours. **          No orders to display                                              Medical Decision Making  Patient presents with constipation.  No other complicating factors.  Wife says he has not a bowel movement 17 days.  No definitive bowel obstruction or impaction is appreciated.  Digital rectal exam was performed by myself.  There was no distal stool impaction present.  Wife prefers outpatient treatment which I agree with given his dementia.  They understand have a low threshold to return to the emergency department if symptoms worsen or other concerns arise.    Problems Addressed:  Constipation, unspecified constipation type: complicated acute illness or injury    Amount and/or Complexity of Data Reviewed  External Data Reviewed: notes.  Labs: ordered. Decision-making details documented in ED Course.  Radiology: ordered and independent interpretation performed. Decision-making details documented in ED Course.    Risk  OTC drugs.  Prescription drug management.        Final diagnoses:   Constipation, unspecified constipation type       ED Disposition  ED Disposition       ED Disposition   Discharge    Condition   Stable    Comment   --           DISCHARGE    Patient discharged in stable condition.    Reviewed implications of results, diagnosis, meds, responsibility to follow up, warning signs and symptoms of possible worsening, potential complications and reasons to return to ER.    Patient/Family voiced understanding of above instructions.    Discussed plan for discharge, as there is no emergent indication for admission.  Pt/family is agreeable and understands need for follow up and  possible repeat testing.  Pt/family is aware that discharge does not mean that nothing is wrong but that it indicates no emergency is currently present that requires admission and they must continue care with follow-up as given below or with a physician of their choice.     FOLLOW-UP  No follow-up provider specified.       Medication List        ASK your doctor about these medications      polyethylene glycol 236 g solution  Commonly known as: GoLYTELY  Take 4,000 mL by mouth 1 (One) Time for 1 dose.  Ask about: Should I take this medication?               Where to Get Your Medications        These medications were sent to [facility] [address] [place] [address] - [address] - [phone]  - [phone]   [address], [facility] [address]      Phone: [phone]   polyethylene glycol 236 g solution            Josh Travis DO  02/22/24 7797

## 2024-02-23 ENCOUNTER — APPOINTMENT (OUTPATIENT)
Dept: GENERAL RADIOLOGY | Facility: HOSPITAL | Age: 84
End: 2024-02-23
Payer: MEDICARE

## 2024-02-23 ENCOUNTER — HOSPITAL ENCOUNTER (EMERGENCY)
Facility: HOSPITAL | Age: 84
Discharge: HOME | End: 2024-02-23
Attending: STUDENT IN AN ORGANIZED HEALTH CARE EDUCATION/TRAINING PROGRAM
Payer: MEDICARE

## 2024-02-23 VITALS
HEART RATE: 76 BPM | SYSTOLIC BLOOD PRESSURE: 111 MMHG | OXYGEN SATURATION: 92 % | RESPIRATION RATE: 18 BRPM | BODY MASS INDEX: 32.14 KG/M2 | TEMPERATURE: 98.4 F | HEIGHT: 66 IN | WEIGHT: 200 LBS | DIASTOLIC BLOOD PRESSURE: 87 MMHG

## 2024-02-23 DIAGNOSIS — F03.A0 MILD DEMENTIA WITHOUT BEHAVIORAL DISTURBANCE, PSYCHOTIC DISTURBANCE, MOOD DISTURBANCE, OR ANXIETY, UNSPECIFIED DEMENTIA TYPE: ICD-10-CM

## 2024-02-23 DIAGNOSIS — R10.84 GENERALIZED ABDOMINAL PAIN: ICD-10-CM

## 2024-02-23 DIAGNOSIS — K59.00 CONSTIPATION, UNSPECIFIED CONSTIPATION TYPE: Primary | ICD-10-CM

## 2024-02-23 LAB
ALBUMIN SERPL-MCNC: 4 G/DL (ref 3.5–5.2)
ALBUMIN/GLOB SERPL: 1.2 G/DL
ALP SERPL-CCNC: 51 U/L (ref 39–117)
ALT SERPL W P-5'-P-CCNC: 13 U/L (ref 1–41)
ANION GAP SERPL CALCULATED.3IONS-SCNC: 10 MMOL/L (ref 5–15)
AST SERPL-CCNC: 25 U/L (ref 1–40)
BACTERIA UR QL AUTO: ABNORMAL /HPF
BASOPHILS # BLD AUTO: 0.05 10*3/MM3 (ref 0–0.2)
BASOPHILS NFR BLD AUTO: 0.8 % (ref 0–1.5)
BILIRUB SERPL-MCNC: 0.6 MG/DL (ref 0–1.2)
BILIRUB UR QL STRIP: NEGATIVE
BUN SERPL-MCNC: 24 MG/DL (ref 8–23)
BUN/CREAT SERPL: 27.3 (ref 7–25)
CALCIUM SPEC-SCNC: 9.6 MG/DL (ref 8.6–10.5)
CHLORIDE SERPL-SCNC: 107 MMOL/L (ref 98–107)
CLARITY UR: CLEAR
CO2 SERPL-SCNC: 28 MMOL/L (ref 22–29)
COLOR UR: ABNORMAL
CREAT SERPL-MCNC: 0.88 MG/DL (ref 0.76–1.27)
DEPRECATED RDW RBC AUTO: 52.1 FL (ref 37–54)
EGFRCR SERPLBLD CKD-EPI 2021: 84.8 ML/MIN/1.73
EOSINOPHIL # BLD AUTO: 0.08 10*3/MM3 (ref 0–0.4)
EOSINOPHIL NFR BLD AUTO: 1.3 % (ref 0.3–6.2)
ERYTHROCYTE [DISTWIDTH] IN BLOOD BY AUTOMATED COUNT: 14.3 % (ref 12.3–15.4)
GLOBULIN UR ELPH-MCNC: 3.4 GM/DL
GLUCOSE SERPL-MCNC: 120 MG/DL (ref 65–99)
GLUCOSE UR STRIP-MCNC: NEGATIVE MG/DL
HCT VFR BLD AUTO: 47.6 % (ref 37.5–51)
HGB BLD-MCNC: 15.1 G/DL (ref 13–17.7)
HGB UR QL STRIP.AUTO: NEGATIVE
HYALINE CASTS UR QL AUTO: ABNORMAL /LPF
IMM GRANULOCYTES # BLD AUTO: 0.02 10*3/MM3 (ref 0–0.05)
IMM GRANULOCYTES NFR BLD AUTO: 0.3 % (ref 0–0.5)
KETONES UR QL STRIP: NEGATIVE
LEUKOCYTE ESTERASE UR QL STRIP.AUTO: ABNORMAL
LYMPHOCYTES # BLD AUTO: 1.06 10*3/MM3 (ref 0.7–3.1)
LYMPHOCYTES NFR BLD AUTO: 17.6 % (ref 19.6–45.3)
MCH RBC QN AUTO: 31.3 PG (ref 26.6–33)
MCHC RBC AUTO-ENTMCNC: 31.7 G/DL (ref 31.5–35.7)
MCV RBC AUTO: 98.8 FL (ref 79–97)
MONOCYTES # BLD AUTO: 0.81 10*3/MM3 (ref 0.1–0.9)
MONOCYTES NFR BLD AUTO: 13.5 % (ref 5–12)
NEUTROPHILS NFR BLD AUTO: 4 10*3/MM3 (ref 1.7–7)
NEUTROPHILS NFR BLD AUTO: 66.5 % (ref 42.7–76)
NITRITE UR QL STRIP: NEGATIVE
NRBC BLD AUTO-RTO: 0 /100 WBC (ref 0–0.2)
PH UR STRIP.AUTO: 7.5 [PH] (ref 5–8)
PLATELET # BLD AUTO: 239 10*3/MM3 (ref 140–450)
PMV BLD AUTO: 10.3 FL (ref 6–12)
POTASSIUM SERPL-SCNC: 3.5 MMOL/L (ref 3.5–5.2)
PROT SERPL-MCNC: 7.4 G/DL (ref 6–8.5)
PROT UR QL STRIP: ABNORMAL
RBC # BLD AUTO: 4.82 10*6/MM3 (ref 4.14–5.8)
RBC # UR STRIP: ABNORMAL /HPF
REF LAB TEST METHOD: ABNORMAL
SODIUM SERPL-SCNC: 145 MMOL/L (ref 136–145)
SP GR UR STRIP: 1.02 (ref 1–1.03)
SQUAMOUS #/AREA URNS HPF: ABNORMAL /HPF
UROBILINOGEN UR QL STRIP: ABNORMAL
WBC # UR STRIP: ABNORMAL /HPF
WBC NRBC COR # BLD AUTO: 6.02 10*3/MM3 (ref 3.4–10.8)

## 2024-02-23 PROCEDURE — 74018 RADEX ABDOMEN 1 VIEW: CPT

## 2024-02-23 PROCEDURE — 99284 EMERGENCY DEPT VISIT MOD MDM: CPT

## 2024-02-23 PROCEDURE — 81001 URINALYSIS AUTO W/SCOPE: CPT | Performed by: PHYSICIAN ASSISTANT

## 2024-02-23 PROCEDURE — 80053 COMPREHEN METABOLIC PANEL: CPT | Performed by: PHYSICIAN ASSISTANT

## 2024-02-23 PROCEDURE — 85025 COMPLETE CBC W/AUTO DIFF WBC: CPT | Performed by: PHYSICIAN ASSISTANT

## 2024-02-23 RX ORDER — POTASSIUM CHLORIDE 750 MG/1
10 TABLET, EXTENDED RELEASE ORAL DAILY
COMMUNITY

## 2024-02-23 RX ORDER — DOXYCYCLINE HYCLATE 100 MG/1
100 CAPSULE ORAL 2 TIMES DAILY
COMMUNITY

## 2024-02-23 RX ORDER — PANTOPRAZOLE SODIUM 40 MG/1
40 TABLET, DELAYED RELEASE ORAL 2 TIMES DAILY
COMMUNITY

## 2024-02-23 RX ORDER — SODIUM CHLORIDE 0.9 % (FLUSH) 0.9 %
10 SYRINGE (ML) INJECTION AS NEEDED
Status: DISCONTINUED | OUTPATIENT
Start: 2024-02-23 | End: 2024-02-23 | Stop reason: HOSPADM

## 2024-02-23 NOTE — CASE MANAGEMENT/SOCIAL WORK
Continued Stay Note  [facility]     Patient Name: Caesar Kim  MRN: [id number]  Today's Date: 2/23/2024    Admit Date: 2/23/2024    Plan: Home with [facility]   Discharge Plan       Row Name 02/23/24 1747       Plan    Plan Home with [facility]    Plan Comments MSW arranged EMS transport back to pt.'s residence with [facility]. [facility] had no availability. [facility] to  around 1845/1900. MSW updated RN. MSW is available.    Final Discharge Disposition Code 01 - home or self-care                   Discharge Codes    No documentation.                       ISAC Osborne

## 2024-02-23 NOTE — DISCHARGE INSTRUCTIONS
Continue to give the remaining bowel prep at home as tolerated.  Follow-up with your PCP or return to the emergency department if symptoms persist or worsen.

## 2024-02-23 NOTE — ED PROVIDER NOTES
Subjective   History of Present Illness  Mr. Kim is an 84-year-old male with history of COPD, CHF, atrial fibrillation, type 2 diabetes, mild dementia, history of abdominal aortic aneurysm, chronic constipation, who presents to the emergency department with complaints of constipation and crampy abdominal pain.  The patient states that he has been constipated for several weeks.  He cannot recall his last bowel movement but notes that he was seen in the our emergency department a couple of days ago for constipation and was given an enema which relieved his symptoms considerably for a short time.  He has no associated nausea or vomiting.  No dysuria or urinary retention.  No fever or chills.  He denies any chest pain or cough.  He lives at home with his wife.      Review of Systems   Constitutional:  Negative for chills and fever.   HENT:  Negative for sore throat.    Respiratory:  Negative for cough and shortness of breath.    Cardiovascular:  Negative for chest pain.   Gastrointestinal:  Positive for abdominal pain (Crampy) and constipation. Negative for blood in stool and vomiting.   Genitourinary:  Negative for decreased urine volume and dysuria.   Musculoskeletal:  Negative for back pain.   Skin:  Negative for color change and pallor.   Neurological:  Negative for light-headedness and headaches.   Psychiatric/Behavioral:  Positive for confusion (Mild, chronic (reported to be at baseline per wife)).        Past Medical History:   Diagnosis Date    AAA (abdominal aortic aneurysm)     Arthritis     Atrial fibrillation     BPH (benign prostatic hyperplasia)     CHF (congestive heart failure)     COPD (chronic obstructive pulmonary disease)     Diabetes mellitus     Elevated cholesterol     GERD (gastroesophageal reflux disease)     Hypertension     Memory loss     Personal history of pulmonary embolism     Pulmonary embolism     Restless leg syndrome     Sleep apnea     Uses CPAP    Unspecified convulsions      Unspecified diastolic (congestive) heart failure     Urinary tract infection        Allergies   Allergen Reactions    Acyclovir GI Intolerance    Atorvastatin Myalgia    Fenofibrate Myalgia    Metformin Unknown - High Severity     Other reaction(s): Unknown      Mirabegron Unknown - High Severity     Other reaction(s): Unknown         Past Surgical History:   Procedure Laterality Date    CHOLECYSTECTOMY      COLONOSCOPY      CYSTOSCOPY TRANSURETHRAL RESECTION OF PROSTATE N/A 2022    Procedure: CYSTOSCOPY TRANSURETHRAL RESECTION OF PROSTATE GREENLIGHT;  Surgeon: Darius Costa MD;  Location: [facility];  Service: Urology;  Laterality: N/A;    ENDOSCOPY      EYE SURGERY Bilateral     CATARACTS    SHOULDER SURGERY Left     SKIN BIOPSY      NECK    TONSILLECTOMY      AND ADENOIDS       Family History   Problem Relation Age of Onset    Heart attack Other     Tuberculosis Other        Social History     Socioeconomic History    Marital status:    Tobacco Use    Smoking status: Former     Types: Cigarettes     Quit date:      Years since quittin.     Passive exposure: Past    Smokeless tobacco: Former     Types: Chew     Quit date:    Substance and Sexual Activity    Alcohol use: Never    Drug use: Defer    Sexual activity: Defer           Objective   Physical Exam  Constitutional:       General: He is not in acute distress.     Appearance: He is not toxic-appearing or diaphoretic.   HENT:      Head: Normocephalic.      Nose: Nose normal.      Mouth/Throat:      Mouth: Mucous membranes are dry.   Eyes:      General: No scleral icterus.     Conjunctiva/sclera: Conjunctivae normal.      Pupils: Pupils are equal, round, and reactive to light.   Cardiovascular:      Rate and Rhythm: Normal rate and regular rhythm.      Pulses: Normal pulses.      Heart sounds: Normal heart sounds.   Pulmonary:      Effort: Pulmonary effort is normal.      Breath sounds: Normal breath sounds.   Abdominal:       General: Bowel sounds are normal.      Tenderness: There is no abdominal tenderness. There is no guarding.      Hernia: A hernia (Small umbilical hernia without incarceration) is present.   Musculoskeletal:         General: No tenderness.      Cervical back: Neck supple.      Right lower leg: No edema.      Left lower leg: No edema.   Skin:     Coloration: Skin is not jaundiced or pale.   Neurological:      General: No focal deficit present.      Mental Status: He is alert.      Comments: Alert, fairly well oriented, mild dementia, no focal deficits   Psychiatric:         Mood and Affect: Mood normal.         Procedures           ED Course      In summary, 84-year-old male with history of mild dementia, COPD (not on home O2) type 2 diabetes, presents with complaints of chronic constipation and some crampy abdominal pain.  The patient states that he has been constipated for several weeks.  He was seen here 2 days ago with the same complaints and received an enema which relieved his symptoms temporarily.  He denies any nausea or vomiting.  Normal urination.  No fever or chills.    MDM: Differential includes constipation, bowel obstruction, urinary retention, electrolyte abnormality, hernia, etc.    Labs and UA obtained.  KUB ordered.  Recent ED visit documents reviewed.  Patient had CT abdomen and pelvis 2 days ago showing constipation but no other acute abnormality.    Labs are bland.  White count is normal at 6.02.  No anemia.    Glucose is 120.  Creatinine is normal at 0.88.  Normal chemistries.  Normal LFTs.    Urinalysis shows 11-20 white cells with no bacteria seen.  He has no urinary symptoms.    KUB:  Nonspecific bowel gas pattern with no acute findings in the abdomen. Mild to moderate fecal retention. The appearance is similar to the previous examination.    The patient's wife is now at bedside.  I spoke with her about his workup.  I suggested that we try to discharge him home on a bowel prep.  She states  that that is what they did before and she was unable to get him to take sufficient amounts of the bowel prep to get any results.  She further states that she is unable to manage him at home in his current condition and would like him to be admitted for bowel prep administration.  I will speak with the hospitalist for possible admission for bowel prep.    Discussed with hospitalist who requests we try enemas down here first before making decision to admit.  Pt and wife agreeable with plan.  Will have RN give soap suds enema.    16:44 EST  Pt had large results after soap suds enema and feels better.  Wife feels he can go home now.  Will have her continue to give the rest of the bowel prep she has at home and f/u or return if worse.                                         Medical Decision Making  Problems Addressed:  Constipation, unspecified constipation type: complicated acute illness or injury  Generalized abdominal pain: complicated acute illness or injury  Mild dementia without behavioral disturbance, psychotic disturbance, mood disturbance, or anxiety, unspecified dementia type: complicated acute illness or injury    Amount and/or Complexity of Data Reviewed  Labs: ordered.  Radiology: ordered.    Risk  Prescription drug management.  Decision regarding hospitalization.        Final diagnoses:   Constipation, unspecified constipation type   Generalized abdominal pain   Mild dementia without behavioral disturbance, psychotic disturbance, mood disturbance, or anxiety, unspecified dementia type       ED Disposition  ED Disposition       ED Disposition   Discharge    Condition   Stable    Comment   --               Daria Tilley MD  [address] [facility] [address] [phone]      As needed    [facility] EMERGENCY DEPARTMENT  [address] [facility] [address] [phone]    If symptoms worsen         Medication List        Changed      topiramate 200 MG  tablet  Commonly known as: TOPAMAX  Take 1 tablet by mouth Daily.  What changed:   how much to take  when to take this                 Isidro Milan PA  02/23/24 1343       Isidro Milan PA  02/23/24 1344       Isidro Milan, PA  02/23/24 5415       Isidro Milan, PA  02/23/24 6309

## (undated) DEVICE — DRAINBAG,ANTI-REFLUX TOWER,L/F,2000ML,LL: Brand: MEDLINE

## (undated) DEVICE — EVAC BLDR UROVAC W ADAPT

## (undated) DEVICE — GLV SURG SENSICARE PI ORTHO PF SZ7 LF STRL

## (undated) DEVICE — PK CYSTO-TUR BASIC 10

## (undated) DEVICE — 6MM REDUCER PLUG - NON-STERILE: Brand: CUSTOM MEDICAL SPECIALTIES, INC.

## (undated) DEVICE — CATH FOL BARDEX 2WY 20F 30CC

## (undated) DEVICE — DEFOGGER!" ANTI FOG KIT: Brand: DEROYAL

## (undated) DEVICE — BLANKT WARM UPPR/BDY ARM/OUT 57X196CM

## (undated) DEVICE — ST PRIM GRVTY NDLESS 3 INJ PORT 105IN